# Patient Record
Sex: FEMALE | Race: WHITE | Employment: OTHER | URBAN - METROPOLITAN AREA
[De-identification: names, ages, dates, MRNs, and addresses within clinical notes are randomized per-mention and may not be internally consistent; named-entity substitution may affect disease eponyms.]

---

## 2017-10-05 ENCOUNTER — DOCTOR'S OFFICE (OUTPATIENT)
Dept: URBAN - METROPOLITAN AREA CLINIC 135 | Facility: CLINIC | Age: 67
Setting detail: OPHTHALMOLOGY
End: 2017-10-05
Payer: COMMERCIAL

## 2017-10-05 DIAGNOSIS — H52.4: ICD-10-CM

## 2017-10-05 DIAGNOSIS — H52.13: ICD-10-CM

## 2017-10-05 DIAGNOSIS — H40.013: ICD-10-CM

## 2017-10-05 DIAGNOSIS — H25.13: ICD-10-CM

## 2017-10-05 DIAGNOSIS — H43.813: ICD-10-CM

## 2017-10-05 DIAGNOSIS — H52.223: ICD-10-CM

## 2017-10-05 PROCEDURE — 92014 COMPRE OPH EXAM EST PT 1/>: CPT | Performed by: OPTOMETRIST

## 2017-10-05 PROCEDURE — 92015 DETERMINE REFRACTIVE STATE: CPT | Performed by: OPTOMETRIST

## 2017-10-05 ASSESSMENT — REFRACTION_OUTSIDERX
OD_VA3: 20/
OD_VA1: 20/20
OD_ADD: +2.50
OS_CYLINDER: -0.75
OS_VA2: 20/20
OD_SPHERE: -1.00
OD_CYLINDER: -0.50
OS_VA1: 20/20-2
OS_ADD: +2.50
OD_AXIS: 100
OS_AXIS: 135
OU_VA: 20/20
OS_SPHERE: -1.75
OD_VA2: 20/20
OS_VA3: 20/

## 2017-10-05 ASSESSMENT — REFRACTION_MANIFEST
OS_VA3: 20/
OU_VA: 20/
OD_VA3: 20/
OD_VA1: 20/
OD_VA2: 20/
OS_VA3: 20/
OS_VA1: 20/
OS_VA1: 20/
OS_VA2: 20/
OD_VA1: 20/
OD_VA3: 20/
OD_VA2: 20/
OU_VA: 20/
OS_VA2: 20/

## 2017-10-05 ASSESSMENT — REFRACTION_CURRENTRX
OS_AXIS: 125
OS_CYLINDER: -0.25
OD_OVR_VA: 20/
OS_SPHERE: -1.25
OD_CYLINDER: -0.25
OD_AXIS: 138
OD_ADD: +2.25
OS_OVR_VA: 20/
OD_SPHERE: -1.50
OD_VPRISM_DIRECTION: PROGS
OS_OVR_VA: 20/
OS_ADD: +2.25
OS_VPRISM_DIRECTION: PROGS
OD_OVR_VA: 20/
OS_OVR_VA: 20/
OD_OVR_VA: 20/

## 2017-10-05 ASSESSMENT — TEAR BREAK UP TIME (TBUT)
OS_TBUT: 1+
OD_TBUT: 1+

## 2017-10-05 ASSESSMENT — REFRACTION_AUTOREFRACTION
OD_SPHERE: -1.50
OS_CYLINDER: -0.50
OS_SPHERE: -1.50
OS_AXIS: 132
OD_AXIS: 102
OD_CYLINDER: -0.50

## 2017-10-05 ASSESSMENT — VISUAL ACUITY
OD_BCVA: 20/25-2
OS_BCVA: 20/25

## 2017-10-05 ASSESSMENT — CONFRONTATIONAL VISUAL FIELD TEST (CVF)
OS_FINDINGS: FULL
OD_FINDINGS: FULL

## 2017-10-05 ASSESSMENT — SPHEQUIV_DERIVED
OS_SPHEQUIV: -1.75
OD_SPHEQUIV: -1.75

## 2018-03-28 ENCOUNTER — TRANSCRIBE ORDERS (OUTPATIENT)
Dept: SCHEDULING | Age: 68
End: 2018-03-28

## 2018-03-28 DIAGNOSIS — D32.0 BENIGN NEOPLASM OF CEREBRAL MENINGES (CMS/HCC): Primary | ICD-10-CM

## 2018-04-02 ENCOUNTER — HOSPITAL ENCOUNTER (OUTPATIENT)
Dept: RADIOLOGY | Facility: HOSPITAL | Age: 68
Discharge: HOME | End: 2018-04-02
Attending: PSYCHIATRY & NEUROLOGY
Payer: MEDICARE

## 2018-04-02 DIAGNOSIS — D32.0 BENIGN NEOPLASM OF CEREBRAL MENINGES (CMS/HCC): ICD-10-CM

## 2018-04-02 RX ORDER — GADOBUTROL 604.72 MG/ML
6 INJECTION INTRAVENOUS
Status: COMPLETED | OUTPATIENT
Start: 2018-04-02 | End: 2018-04-02

## 2018-04-02 RX ADMIN — GADOBUTROL 6 MMOL: 604.72 INJECTION INTRAVENOUS at 10:57

## 2018-04-05 ENCOUNTER — DOCTOR'S OFFICE (OUTPATIENT)
Dept: URBAN - METROPOLITAN AREA CLINIC 135 | Facility: CLINIC | Age: 68
Setting detail: OPHTHALMOLOGY
End: 2018-04-05
Payer: COMMERCIAL

## 2018-04-05 DIAGNOSIS — H25.043: ICD-10-CM

## 2018-04-05 DIAGNOSIS — H25.13: ICD-10-CM

## 2018-04-05 DIAGNOSIS — H43.813: ICD-10-CM

## 2018-04-05 DIAGNOSIS — H40.013: ICD-10-CM

## 2018-04-05 PROCEDURE — 99213 OFFICE O/P EST LOW 20 MIN: CPT | Performed by: OPTOMETRIST

## 2018-04-05 PROCEDURE — 92083 EXTENDED VISUAL FIELD XM: CPT | Performed by: OPTOMETRIST

## 2018-04-05 ASSESSMENT — CONFRONTATIONAL VISUAL FIELD TEST (CVF)
OS_FINDINGS: FULL
OD_FINDINGS: FULL

## 2018-04-05 ASSESSMENT — TEAR BREAK UP TIME (TBUT)
OS_TBUT: 1+
OD_TBUT: 1+

## 2018-04-19 ENCOUNTER — DOCTOR'S OFFICE (OUTPATIENT)
Dept: URBAN - METROPOLITAN AREA CLINIC 135 | Facility: CLINIC | Age: 68
Setting detail: OPHTHALMOLOGY
End: 2018-04-19
Payer: COMMERCIAL

## 2018-04-19 ENCOUNTER — RX ONLY (RX ONLY)
Age: 68
End: 2018-04-19

## 2018-04-19 DIAGNOSIS — H25.013: ICD-10-CM

## 2018-04-19 DIAGNOSIS — H25.043: ICD-10-CM

## 2018-04-19 DIAGNOSIS — H40.013: ICD-10-CM

## 2018-04-19 DIAGNOSIS — H43.813: ICD-10-CM

## 2018-04-19 PROCEDURE — 92014 COMPRE OPH EXAM EST PT 1/>: CPT | Performed by: OPHTHALMOLOGY

## 2018-04-19 ASSESSMENT — REFRACTION_AUTOREFRACTION
OD_AXIS: 102
OS_SPHERE: -1.50
OS_SPHERE: -1.50
OS_CYLINDER: -0.50
OD_CYLINDER: -0.50
OS_CYLINDER: -0.50
OD_SPHERE: -1.50
OS_AXIS: 132
OD_CYLINDER: -0.50
OS_AXIS: 132
OD_SPHERE: -1.50
OD_AXIS: 102

## 2018-04-19 ASSESSMENT — REFRACTION_CURRENTRX
OS_OVR_VA: 20/
OD_OVR_VA: 20/
OD_OVR_VA: 20/
OS_VPRISM_DIRECTION: PROGS
OS_CYLINDER: -0.25
OS_OVR_VA: 20/
OS_ADD: +2.25
OD_SPHERE: -1.50
OD_ADD: +2.25
OD_AXIS: 138
OS_OVR_VA: 20/
OS_VPRISM_DIRECTION: PROGS
OS_SPHERE: -1.25
OS_SPHERE: -1.25
OD_OVR_VA: 20/
OD_VPRISM_DIRECTION: PROGS
OD_CYLINDER: -0.25
OD_SPHERE: -1.50
OS_OVR_VA: 20/
OS_CYLINDER: -0.25
OD_CYLINDER: -0.25
OD_AXIS: 138
OS_AXIS: 125
OS_AXIS: 125
OS_OVR_VA: 20/
OD_ADD: +2.25
OD_OVR_VA: 20/
OS_OVR_VA: 20/
OD_VPRISM_DIRECTION: PROGS
OS_ADD: +2.25

## 2018-04-19 ASSESSMENT — CONFRONTATIONAL VISUAL FIELD TEST (CVF)
OD_FINDINGS: FULL
OS_FINDINGS: FULL

## 2018-04-19 ASSESSMENT — REFRACTION_MANIFEST
OD_VA1: 20/
OS_VA1: 20/
OD_VA1: 20/
OD_VA3: 20/
OD_VA3: 20/
OS_VA3: 20/
OD_VA2: 20/
OD_VA1: 20/
OD_VA2: 20/
OU_VA: 20/
OU_VA: 20/
OS_VA2: 20/
OD_VA2: 20/
OD_VA3: 20/
OS_VA2: 20/
OD_VA1: 20/
OS_VA3: 20/
OD_VA2: 20/
OS_VA1: 20/
OU_VA: 20/
OS_VA3: 20/
OS_VA2: 20/
OS_VA1: 20/
OS_VA2: 20/
OS_VA3: 20/
OS_VA1: 20/
OU_VA: 20/
OD_VA3: 20/

## 2018-04-19 ASSESSMENT — REFRACTION_OUTSIDERX
OS_AXIS: 135
OU_VA: 20/20
OD_CYLINDER: -0.50
OS_VA2: 20/20
OS_CYLINDER: -0.75
OD_VA3: 20/
OS_VA1: 20/20-2
OD_CYLINDER: -0.50
OD_VA3: 20/
OU_VA: 20/20
OS_VA1: 20/20-2
OD_ADD: +2.50
OS_ADD: +2.50
OD_AXIS: 100
OS_SPHERE: -1.75
OD_SPHERE: -1.00
OS_AXIS: 135
OD_VA1: 20/20
OD_AXIS: 100
OS_SPHERE: -1.75
OS_VA2: 20/20
OD_VA1: 20/20
OD_VA2: 20/20
OD_SPHERE: -1.00
OS_ADD: +2.50
OS_CYLINDER: -0.75
OS_VA3: 20/
OS_VA3: 20/
OD_VA2: 20/20
OD_ADD: +2.50

## 2018-04-19 ASSESSMENT — SPHEQUIV_DERIVED
OD_SPHEQUIV: -1.75
OS_SPHEQUIV: -1.75
OS_SPHEQUIV: -1.75
OD_SPHEQUIV: -1.75

## 2018-04-19 ASSESSMENT — AXIALLENGTH_DERIVED
OD_AL: 24.8926
OS_AL: 24.9953

## 2018-04-19 ASSESSMENT — TEAR BREAK UP TIME (TBUT)
OD_TBUT: 1+
OS_TBUT: 1+

## 2018-04-19 ASSESSMENT — KERATOMETRY
OS_K1POWER_DIOPTERS: 41.75
OS_AXISANGLE_DEGREES: 169
OD_AXISANGLE_DEGREES: 120
OD_K2POWER_DIOPTERS: 42.25
OS_K2POWER_DIOPTERS: 41.75
OD_K1POWER_DIOPTERS: 41.75

## 2018-04-19 ASSESSMENT — VISUAL ACUITY
OS_BCVA: 20/25
OD_BCVA: 20/50+1
OD_BCVA: 20/50
OS_BCVA: 20/25

## 2018-06-07 ENCOUNTER — DOCTOR'S OFFICE (OUTPATIENT)
Dept: URBAN - METROPOLITAN AREA CLINIC 135 | Facility: CLINIC | Age: 68
Setting detail: OPHTHALMOLOGY
End: 2018-06-07
Payer: COMMERCIAL

## 2018-06-07 DIAGNOSIS — H25.013: ICD-10-CM

## 2018-06-07 PROCEDURE — 92136 OPHTHALMIC BIOMETRY: CPT | Performed by: OPHTHALMOLOGY

## 2018-06-18 ENCOUNTER — AMBUL SURGICAL CARE (OUTPATIENT)
Dept: URBAN - METROPOLITAN AREA SURGERY 29 | Facility: SURGERY | Age: 68
Setting detail: OPHTHALMOLOGY
End: 2018-06-18
Payer: COMMERCIAL

## 2018-06-18 ENCOUNTER — RX ONLY (RX ONLY)
Age: 68
End: 2018-06-18

## 2018-06-18 ENCOUNTER — DOCTOR'S OFFICE (OUTPATIENT)
Dept: URBAN - METROPOLITAN AREA CLINIC 135 | Facility: CLINIC | Age: 68
Setting detail: OPHTHALMOLOGY
End: 2018-06-18
Payer: COMMERCIAL

## 2018-06-18 DIAGNOSIS — H25.012: ICD-10-CM

## 2018-06-18 DIAGNOSIS — H25.12: ICD-10-CM

## 2018-06-18 DIAGNOSIS — H43.813: ICD-10-CM

## 2018-06-18 DIAGNOSIS — H25.043: ICD-10-CM

## 2018-06-18 DIAGNOSIS — H25.013: ICD-10-CM

## 2018-06-18 DIAGNOSIS — H25.042: ICD-10-CM

## 2018-06-18 DIAGNOSIS — H40.013: ICD-10-CM

## 2018-06-18 PROCEDURE — G8907 PT DOC NO EVENTS ON DISCHARG: HCPCS | Performed by: OPHTHALMOLOGY

## 2018-06-18 PROCEDURE — 66984 XCAPSL CTRC RMVL W/O ECP: CPT | Performed by: OPHTHALMOLOGY

## 2018-06-18 PROCEDURE — G8918 PT W/O PREOP ORDER IV AB PRO: HCPCS | Performed by: OPHTHALMOLOGY

## 2018-06-18 PROCEDURE — 99024 POSTOP FOLLOW-UP VISIT: CPT | Performed by: OPHTHALMOLOGY

## 2018-06-18 ASSESSMENT — REFRACTION_OUTSIDERX
OS_AXIS: 135
OD_SPHERE: -1.00
OD_AXIS: 100
OD_VA2: 20/20
OS_VA3: 20/
OS_SPHERE: -1.75
OD_ADD: +2.50
OS_ADD: +2.50
OS_VA2: 20/20
OS_CYLINDER: -0.75
OS_VA1: 20/20-2
OD_VA3: 20/
OU_VA: 20/20
OD_VA1: 20/20
OD_CYLINDER: -0.50

## 2018-06-18 ASSESSMENT — TEAR BREAK UP TIME (TBUT)
OS_TBUT: 1+
OD_TBUT: 1+

## 2018-06-18 ASSESSMENT — REFRACTION_MANIFEST
OD_VA1: 20/
OD_VA3: 20/
OS_VA2: 20/
OD_VA2: 20/
OU_VA: 20/
OS_VA3: 20/
OS_VA2: 20/
OU_VA: 20/
OS_VA1: 20/
OD_VA2: 20/
OD_VA3: 20/
OD_VA1: 20/
OS_VA1: 20/
OS_VA3: 20/

## 2018-06-18 ASSESSMENT — REFRACTION_CURRENTRX
OS_OVR_VA: 20/
OS_ADD: +2.25
OD_OVR_VA: 20/
OD_SPHERE: -1.50
OS_OVR_VA: 20/
OD_CYLINDER: -0.25
OD_AXIS: 138
OS_CYLINDER: -0.25
OS_AXIS: 125
OS_SPHERE: -1.25
OD_ADD: +2.25
OS_VPRISM_DIRECTION: PROGS
OS_OVR_VA: 20/
OD_VPRISM_DIRECTION: PROGS

## 2018-06-18 ASSESSMENT — VISUAL ACUITY
OS_BCVA: 20/25
OD_BCVA: 20/25+1

## 2018-06-18 ASSESSMENT — REFRACTION_AUTOREFRACTION
OS_CYLINDER: -0.50
OD_SPHERE: -1.50
OS_AXIS: 132
OD_CYLINDER: -0.50
OS_SPHERE: -1.50
OD_AXIS: 102

## 2018-06-18 ASSESSMENT — SPHEQUIV_DERIVED
OD_SPHEQUIV: -1.75
OS_SPHEQUIV: -1.75

## 2018-06-28 ENCOUNTER — DOCTOR'S OFFICE (OUTPATIENT)
Dept: URBAN - METROPOLITAN AREA CLINIC 135 | Facility: CLINIC | Age: 68
Setting detail: OPHTHALMOLOGY
End: 2018-06-28
Payer: COMMERCIAL

## 2018-06-28 DIAGNOSIS — Z96.1: ICD-10-CM

## 2018-06-28 PROCEDURE — 99024 POSTOP FOLLOW-UP VISIT: CPT | Performed by: OPTOMETRIST

## 2018-06-28 ASSESSMENT — REFRACTION_MANIFEST
OS_VA3: 20/
OS_VA2: 20/
OD_VA2: 20/
OS_VA1: 20/
OD_VA3: 20/
OD_VA1: 20/
OD_VA1: 20/
OD_VA2: 20/
OS_VA1: 20/
OU_VA: 20/
OU_VA: 20/
OS_VA3: 20/
OD_VA3: 20/
OS_VA2: 20/

## 2018-06-28 ASSESSMENT — REFRACTION_CURRENTRX
OD_ADD: +2.25
OS_OVR_VA: 20/
OS_CYLINDER: -0.25
OD_SPHERE: -1.50
OS_AXIS: 125
OS_ADD: +2.25
OS_VPRISM_DIRECTION: PROGS
OS_OVR_VA: 20/
OS_OVR_VA: 20/
OS_SPHERE: -1.25
OD_OVR_VA: 20/
OD_AXIS: 138
OD_VPRISM_DIRECTION: PROGS
OD_CYLINDER: -0.25
OD_OVR_VA: 20/
OD_OVR_VA: 20/

## 2018-06-28 ASSESSMENT — AXIALLENGTH_DERIVED: OD_AL: 24.8926

## 2018-06-28 ASSESSMENT — REFRACTION_OUTSIDERX
OD_CYLINDER: -0.50
OS_VA3: 20/
OD_ADD: +2.50
OS_AXIS: 135
OD_SPHERE: -1.00
OD_VA3: 20/
OS_ADD: +2.50
OS_CYLINDER: -0.75
OD_AXIS: 100
OU_VA: 20/20
OS_VA2: 20/20
OS_VA1: 20/20-2
OS_SPHERE: -1.75
OD_VA2: 20/20
OD_VA1: 20/20

## 2018-06-28 ASSESSMENT — REFRACTION_AUTOREFRACTION
OD_SPHERE: -1.50
OD_CYLINDER: -0.50
OS_SPHERE: -0.50
OD_AXIS: 102

## 2018-06-28 ASSESSMENT — KERATOMETRY
OD_K2POWER_DIOPTERS: 42.25
OD_K1POWER_DIOPTERS: 41.75
OD_AXISANGLE_DEGREES: 120
OS_K1POWER_DIOPTERS: 41.75
OS_K2POWER_DIOPTERS: 41.75
OS_AXISANGLE_DEGREES: 169

## 2018-06-28 ASSESSMENT — VISUAL ACUITY
OD_BCVA: 20/20
OS_BCVA: 20/25

## 2018-06-28 ASSESSMENT — SPHEQUIV_DERIVED: OD_SPHEQUIV: -1.75

## 2018-06-28 ASSESSMENT — TEAR BREAK UP TIME (TBUT)
OD_TBUT: 1+
OS_TBUT: 1+

## 2018-07-02 ENCOUNTER — AMBUL SURGICAL CARE (OUTPATIENT)
Dept: URBAN - METROPOLITAN AREA SURGERY 29 | Facility: SURGERY | Age: 68
Setting detail: OPHTHALMOLOGY
End: 2018-07-02
Payer: COMMERCIAL

## 2018-07-02 ENCOUNTER — DOCTOR'S OFFICE (OUTPATIENT)
Dept: URBAN - METROPOLITAN AREA CLINIC 135 | Facility: CLINIC | Age: 68
Setting detail: OPHTHALMOLOGY
End: 2018-07-02

## 2018-07-02 DIAGNOSIS — H25.041: ICD-10-CM

## 2018-07-02 DIAGNOSIS — Z96.1: ICD-10-CM

## 2018-07-02 DIAGNOSIS — H25.011: ICD-10-CM

## 2018-07-02 DIAGNOSIS — H25.11: ICD-10-CM

## 2018-07-02 PROBLEM — H25.013 CATARACT; BOTH EYES: Status: RESOLVED | Noted: 2018-04-19 | Resolved: 2018-07-02

## 2018-07-02 PROCEDURE — 66984 XCAPSL CTRC RMVL W/O ECP: CPT | Performed by: OPHTHALMOLOGY

## 2018-07-02 PROCEDURE — G8907 PT DOC NO EVENTS ON DISCHARG: HCPCS | Performed by: OPHTHALMOLOGY

## 2018-07-02 PROCEDURE — 99024 POSTOP FOLLOW-UP VISIT: CPT | Performed by: OPHTHALMOLOGY

## 2018-07-02 PROCEDURE — G8918 PT W/O PREOP ORDER IV AB PRO: HCPCS | Performed by: OPHTHALMOLOGY

## 2018-07-02 ASSESSMENT — REFRACTION_MANIFEST
OS_VA2: 20/
OS_VA1: 20/
OU_VA: 20/
OS_VA3: 20/
OS_VA1: 20/
OU_VA: 20/
OD_VA2: 20/
OD_VA3: 20/
OS_VA3: 20/
OD_VA1: 20/
OD_VA1: 20/
OS_VA2: 20/
OD_VA3: 20/
OD_VA2: 20/

## 2018-07-02 ASSESSMENT — REFRACTION_OUTSIDERX
OD_VA1: 20/20
OD_SPHERE: -1.00
OD_CYLINDER: -0.50
OS_ADD: +2.50
OU_VA: 20/20
OD_VA3: 20/
OS_SPHERE: -1.75
OD_ADD: +2.50
OD_VA2: 20/20
OS_VA3: 20/
OS_VA1: 20/20-2
OD_AXIS: 100
OS_VA2: 20/20
OS_AXIS: 135
OS_CYLINDER: -0.75

## 2018-07-02 ASSESSMENT — VISUAL ACUITY
OS_BCVA: 20/20-1
OD_BCVA: 20/20

## 2018-07-02 ASSESSMENT — REFRACTION_CURRENTRX
OD_OVR_VA: 20/
OS_SPHERE: -1.25
OS_OVR_VA: 20/
OS_ADD: +2.25
OD_SPHERE: -1.50
OD_CYLINDER: -0.25
OS_AXIS: 125
OD_AXIS: 138
OS_OVR_VA: 20/
OS_VPRISM_DIRECTION: PROGS
OS_CYLINDER: -0.25
OS_OVR_VA: 20/
OD_OVR_VA: 20/
OD_OVR_VA: 20/
OD_ADD: +2.25
OD_VPRISM_DIRECTION: PROGS

## 2018-07-02 ASSESSMENT — REFRACTION_AUTOREFRACTION
OS_SPHERE: -0.50
OD_SPHERE: -1.50
OD_AXIS: 102
OD_CYLINDER: -0.50

## 2018-07-02 ASSESSMENT — TEAR BREAK UP TIME (TBUT)
OS_TBUT: 1+
OD_TBUT: 1+

## 2018-07-02 ASSESSMENT — SPHEQUIV_DERIVED: OD_SPHEQUIV: -1.75

## 2018-07-09 ENCOUNTER — DOCTOR'S OFFICE (OUTPATIENT)
Dept: URBAN - METROPOLITAN AREA CLINIC 125 | Facility: CLINIC | Age: 68
Setting detail: OPHTHALMOLOGY
End: 2018-07-09
Payer: COMMERCIAL

## 2018-07-09 DIAGNOSIS — Z96.1: ICD-10-CM

## 2018-07-09 PROCEDURE — 99024 POSTOP FOLLOW-UP VISIT: CPT | Performed by: OPHTHALMOLOGY

## 2018-07-09 ASSESSMENT — TEAR BREAK UP TIME (TBUT)
OD_TBUT: 1+
OS_TBUT: 1+

## 2018-07-09 ASSESSMENT — REFRACTION_MANIFEST
OD_VA2: 20/
OU_VA: 20/
OS_VA3: 20/
OS_VA1: 20/
OD_VA3: 20/
OS_VA1: 20/
OD_VA2: 20/
OD_VA1: 20/
OD_VA1: 20/
OS_VA2: 20/
OS_VA3: 20/
OD_VA3: 20/
OS_VA2: 20/
OU_VA: 20/

## 2018-07-09 ASSESSMENT — KERATOMETRY
OS_K1POWER_DIOPTERS: 41.75
OD_K2POWER_DIOPTERS: 41.75
OS_AXISANGLE_DEGREES: 169
OD_AXISANGLE_DEGREES: 000
OS_K2POWER_DIOPTERS: 41.75
OD_K1POWER_DIOPTERS: 41.75

## 2018-07-09 ASSESSMENT — REFRACTION_CURRENTRX
OS_OVR_VA: 20/
OD_OVR_VA: 20/
OD_CYLINDER: -0.25
OD_SPHERE: -1.50
OS_AXIS: 125
OS_OVR_VA: 20/
OD_VPRISM_DIRECTION: PROGS
OS_OVR_VA: 20/
OD_ADD: +2.25
OS_ADD: +2.25
OD_AXIS: 138
OS_CYLINDER: -0.25
OS_VPRISM_DIRECTION: PROGS
OS_SPHERE: -1.25

## 2018-07-09 ASSESSMENT — AXIALLENGTH_DERIVED: OD_AL: 24.5128

## 2018-07-09 ASSESSMENT — REFRACTION_OUTSIDERX
OD_VA2: 20/20
OU_VA: 20/20
OS_VA1: 20/20-2
OD_CYLINDER: -0.50
OS_AXIS: 135
OD_SPHERE: -1.00
OS_VA3: 20/
OS_VA2: 20/20
OD_AXIS: 100
OD_VA1: 20/20
OD_VA3: 20/
OS_SPHERE: -1.75
OS_ADD: +2.50
OS_CYLINDER: -0.75
OD_ADD: +2.50

## 2018-07-09 ASSESSMENT — VISUAL ACUITY
OD_BCVA: 20/20
OS_BCVA: 20/30-1

## 2018-07-09 ASSESSMENT — REFRACTION_AUTOREFRACTION
OD_SPHERE: -0.25
OD_AXIS: 117
OD_CYLINDER: -0.75
OS_SPHERE: -0.50

## 2018-07-09 ASSESSMENT — SPHEQUIV_DERIVED: OD_SPHEQUIV: -0.625

## 2018-07-24 ENCOUNTER — TRANSCRIBE ORDERS (OUTPATIENT)
Dept: SCHEDULING | Age: 68
End: 2018-07-24

## 2018-07-24 DIAGNOSIS — Z12.31 ENCOUNTER FOR SCREENING MAMMOGRAM FOR MALIGNANT NEOPLASM OF BREAST: Primary | ICD-10-CM

## 2018-07-30 ENCOUNTER — RX ONLY (RX ONLY)
Age: 68
End: 2018-07-30

## 2018-07-30 ENCOUNTER — DOCTOR'S OFFICE (OUTPATIENT)
Dept: URBAN - METROPOLITAN AREA CLINIC 125 | Facility: CLINIC | Age: 68
Setting detail: OPHTHALMOLOGY
End: 2018-07-30
Payer: COMMERCIAL

## 2018-07-30 DIAGNOSIS — Z96.1: ICD-10-CM

## 2018-07-30 PROCEDURE — 99024 POSTOP FOLLOW-UP VISIT: CPT | Performed by: OPTOMETRIST

## 2018-07-30 ASSESSMENT — REFRACTION_MANIFEST
OD_VA1: 20/
OS_VA2: 20/
OD_VA3: 20/
OS_VA3: 20/
OS_VA1: 20/
OD_VA2: 20/
OS_VA2: 20/
OU_VA: 20/
OS_VA1: 20/
OS_VA3: 20/
OD_VA1: 20/
OD_VA2: 20/
OD_VA3: 20/
OU_VA: 20/

## 2018-07-30 ASSESSMENT — REFRACTION_OUTSIDERX
OD_CYLINDER: -0.75
OS_VA2: 20/20
OD_SPHERE: PLANO
OD_VA1: 20/20
OS_AXIS: 090
OD_AXIS: 115
OS_CYLINDER: -0.50
OS_VA3: 20/
OS_ADD: +2.50
OU_VA: 20/20
OD_ADD: +2.50
OS_SPHERE: PLANO
OD_VA3: 20/
OS_VA1: 20/20
OD_VA2: 20/20

## 2018-07-30 ASSESSMENT — SPHEQUIV_DERIVED
OS_SPHEQUIV: -0.5
OD_SPHEQUIV: -0.375

## 2018-07-30 ASSESSMENT — AXIALLENGTH_DERIVED
OD_AL: 24.4081
OS_AL: 24.4603

## 2018-07-30 ASSESSMENT — REFRACTION_CURRENTRX
OD_OVR_VA: 20/
OD_ADD: +2.25
OD_VPRISM_DIRECTION: PROGS
OD_CYLINDER: -0.25
OD_OVR_VA: 20/
OS_VPRISM_DIRECTION: PROGS
OD_SPHERE: -1.50
OS_ADD: +2.25
OS_AXIS: 125
OS_OVR_VA: 20/
OD_OVR_VA: 20/
OS_SPHERE: -1.25
OD_AXIS: 138
OS_OVR_VA: 20/
OS_CYLINDER: -0.25
OS_OVR_VA: 20/

## 2018-07-30 ASSESSMENT — TEAR BREAK UP TIME (TBUT)
OD_TBUT: 1+
OS_TBUT: 1+

## 2018-07-30 ASSESSMENT — KERATOMETRY
OS_K2POWER_DIOPTERS: 41.75
OS_K1POWER_DIOPTERS: 41.75
OS_AXISANGLE_DEGREES: 169
OD_AXISANGLE_DEGREES: 000
OD_K1POWER_DIOPTERS: 41.75
OD_K2POWER_DIOPTERS: 41.75

## 2018-07-30 ASSESSMENT — REFRACTION_AUTOREFRACTION
OS_AXIS: 090
OD_AXIS: 121
OD_SPHERE: 0.00
OS_CYLINDER: -0.50
OD_CYLINDER: -0.75
OS_SPHERE: -0.25

## 2018-07-30 ASSESSMENT — VISUAL ACUITY
OD_BCVA: 20/20
OS_BCVA: 20/30-

## 2018-08-28 ENCOUNTER — HOSPITAL ENCOUNTER (OUTPATIENT)
Dept: RADIOLOGY | Age: 68
Discharge: HOME | End: 2018-08-28
Attending: OBSTETRICS & GYNECOLOGY
Payer: MEDICARE

## 2018-08-28 ENCOUNTER — TRANSCRIBE ORDERS (OUTPATIENT)
Dept: RADIOLOGY | Age: 68
End: 2018-08-28

## 2018-08-28 DIAGNOSIS — Z12.31 ENCOUNTER FOR SCREENING MAMMOGRAM FOR MALIGNANT NEOPLASM OF BREAST: ICD-10-CM

## 2018-08-28 PROCEDURE — 77067 SCR MAMMO BI INCL CAD: CPT

## 2019-01-17 ENCOUNTER — DOCTOR'S OFFICE (OUTPATIENT)
Dept: URBAN - METROPOLITAN AREA CLINIC 135 | Facility: CLINIC | Age: 69
Setting detail: OPHTHALMOLOGY
End: 2019-01-17
Payer: COMMERCIAL

## 2019-01-17 DIAGNOSIS — H43.813: ICD-10-CM

## 2019-01-17 DIAGNOSIS — H40.013: ICD-10-CM

## 2019-01-17 DIAGNOSIS — Z96.1: ICD-10-CM

## 2019-01-17 PROBLEM — H25.043 PSC POLAR AGE RELATED CATARACT; BOTH EYES: Status: RESOLVED | Noted: 2018-04-05 | Resolved: 2019-01-17

## 2019-01-17 PROCEDURE — 92012 INTRM OPH EXAM EST PATIENT: CPT | Performed by: OPHTHALMOLOGY

## 2019-01-17 ASSESSMENT — REFRACTION_CURRENTRX
OD_AXIS: 138
OD_OVR_VA: 20/
OS_ADD: +2.25
OS_VPRISM_DIRECTION: PROGS
OS_AXIS: 125
OS_OVR_VA: 20/
OS_SPHERE: -1.25
OD_CYLINDER: -0.25
OD_ADD: +2.25
OS_OVR_VA: 20/
OD_SPHERE: -1.50
OS_OVR_VA: 20/
OD_OVR_VA: 20/
OD_VPRISM_DIRECTION: PROGS
OS_CYLINDER: -0.25
OD_OVR_VA: 20/

## 2019-01-17 ASSESSMENT — REFRACTION_MANIFEST
OD_VA3: 20/
OS_VA3: 20/
OS_VA2: 20/
OS_VA2: 20/20
OD_VA2: 20/
OU_VA: 20/
OD_AXIS: 115
OU_VA: 20/20
OD_ADD: +2.50
OS_VA3: 20/
OS_ADD: +2.50
OD_VA2: 20/20
OD_VA1: 20/
OD_SPHERE: PLANO
OS_SPHERE: PLANO
OD_CYLINDER: -0.75
OD_VA3: 20/
OS_VA1: 20/20
OD_VA1: 20/20
OS_AXIS: 090
OS_CYLINDER: -0.50
OS_VA1: 20/

## 2019-01-17 ASSESSMENT — SUPERFICIAL PUNCTATE KERATITIS (SPK)
OS_SPK: T
OD_SPK: T

## 2019-01-17 ASSESSMENT — REFRACTION_AUTOREFRACTION
OD_CYLINDER: -0.75
OS_AXIS: 090
OD_AXIS: 121
OS_CYLINDER: -0.50
OD_SPHERE: 0.00
OS_SPHERE: -0.25

## 2019-01-17 ASSESSMENT — VISUAL ACUITY
OD_BCVA: 20/20
OS_BCVA: 20/30+2

## 2019-01-17 ASSESSMENT — SPHEQUIV_DERIVED
OS_SPHEQUIV: -0.5
OD_SPHEQUIV: -0.375

## 2019-01-31 ENCOUNTER — TRANSCRIBE ORDERS (OUTPATIENT)
Dept: SCHEDULING | Age: 69
End: 2019-01-31

## 2019-01-31 ENCOUNTER — APPOINTMENT (OUTPATIENT)
Dept: LAB | Age: 69
End: 2019-01-31
Attending: INTERNAL MEDICINE
Payer: MEDICARE

## 2019-01-31 DIAGNOSIS — R14.0 ABDOMINAL DISTENSION (GASEOUS): ICD-10-CM

## 2019-01-31 DIAGNOSIS — K59.9 FUNCTIONAL INTESTINAL DISORDER: Primary | ICD-10-CM

## 2019-01-31 DIAGNOSIS — Z01.818 ENCOUNTER FOR OTHER PREPROCEDURAL EXAMINATION: ICD-10-CM

## 2019-01-31 LAB
BUN SERPL-MCNC: 16 MG/DL (ref 8–20)
CREAT SERPL-MCNC: 1 MG/DL
GFR SERPL CREATININE-BSD FRML MDRD: 55 ML/MIN/1.73M*2

## 2019-01-31 PROCEDURE — 36415 COLL VENOUS BLD VENIPUNCTURE: CPT

## 2019-01-31 PROCEDURE — 84520 ASSAY OF UREA NITROGEN: CPT

## 2019-01-31 PROCEDURE — 82565 ASSAY OF CREATININE: CPT

## 2019-02-01 ENCOUNTER — HOSPITAL ENCOUNTER (OUTPATIENT)
Dept: RADIOLOGY | Facility: HOSPITAL | Age: 69
Discharge: HOME | End: 2019-02-01
Attending: INTERNAL MEDICINE
Payer: MEDICARE

## 2019-02-01 VITALS — BODY MASS INDEX: 25.51 KG/M2 | WEIGHT: 135 LBS

## 2019-02-01 DIAGNOSIS — K59.9 FUNCTIONAL INTESTINAL DISORDER: ICD-10-CM

## 2019-02-01 DIAGNOSIS — R14.0 ABDOMINAL DISTENSION (GASEOUS): ICD-10-CM

## 2019-02-01 RX ORDER — GADOBUTROL 604.72 MG/ML
6 INJECTION INTRAVENOUS ONCE
Status: COMPLETED | OUTPATIENT
Start: 2019-02-01 | End: 2019-02-01

## 2019-02-01 RX ADMIN — GADOBUTROL 6 ML: 604.72 INJECTION INTRAVENOUS at 10:21

## 2019-02-28 ENCOUNTER — APPOINTMENT (EMERGENCY)
Dept: RADIOLOGY | Facility: HOSPITAL | Age: 69
End: 2019-02-28
Attending: EMERGENCY MEDICINE
Payer: MEDICARE

## 2019-02-28 ENCOUNTER — HOSPITAL ENCOUNTER (EMERGENCY)
Facility: HOSPITAL | Age: 69
Discharge: HOME | End: 2019-02-28
Attending: EMERGENCY MEDICINE | Admitting: EMERGENCY MEDICINE
Payer: MEDICARE

## 2019-02-28 VITALS
WEIGHT: 135 LBS | HEIGHT: 61 IN | RESPIRATION RATE: 16 BRPM | DIASTOLIC BLOOD PRESSURE: 87 MMHG | SYSTOLIC BLOOD PRESSURE: 133 MMHG | HEART RATE: 71 BPM | BODY MASS INDEX: 25.49 KG/M2 | TEMPERATURE: 97.8 F | OXYGEN SATURATION: 97 %

## 2019-02-28 DIAGNOSIS — M79.2 NEURALGIA INVOLVING SCALP: Primary | ICD-10-CM

## 2019-02-28 PROCEDURE — 70450 CT HEAD/BRAIN W/O DYE: CPT

## 2019-02-28 PROCEDURE — 63700000 HC SELF-ADMINISTRABLE DRUG: Performed by: NURSE PRACTITIONER

## 2019-02-28 PROCEDURE — 99284 EMERGENCY DEPT VISIT MOD MDM: CPT | Mod: 25

## 2019-02-28 RX ORDER — ACETAMINOPHEN 325 MG/1
650 TABLET ORAL ONCE
Status: COMPLETED | OUTPATIENT
Start: 2019-02-28 | End: 2019-02-28

## 2019-02-28 RX ADMIN — ACETAMINOPHEN 650 MG: 325 TABLET ORAL at 16:10

## 2019-02-28 ASSESSMENT — ENCOUNTER SYMPTOMS
SORE THROAT: 0
BACK PAIN: 0
COLOR CHANGE: 0
SHORTNESS OF BREATH: 0
ABDOMINAL PAIN: 0
VOMITING: 0
NUMBNESS: 0
DIARRHEA: 0
FATIGUE: 0
DIZZINESS: 0
NECK PAIN: 0
CONFUSION: 0
FEVER: 0
WEAKNESS: 0
SPEECH DIFFICULTY: 0
NAUSEA: 0
CONSTIPATION: 0
HEADACHES: 1
EYE PAIN: 0
COUGH: 0
PHOTOPHOBIA: 0
DIFFICULTY URINATING: 0

## 2019-02-28 NOTE — ED ATTESTATION NOTE
2/28/20195:19 PM  I have personally seen and examined the patient.  I reviewed and agree with the PA/NP/Resident's assessment and plan of care.    My examination, assessment, and plan of care of Muriel Franklin is as follows:  The patient presents with left scalp pulsations that are intermittent.  She denies a headache or nausea or vomiting.  She denies a fever chills.  She denies any rashes.  The patient states that she was recently on vacation and flown in airplane and slept on the airplane with her head cocked in the uncomfortable position.  She denies any trauma.  She denies any numbness tingling or weakness in the extremities.      Exam: The patient's scalp reveals no rashes or lesions.  The skin is somewhat sensitive to palpation and she had several episodes of intermittent discomfort that resolved but was examining her.  Neurologically she has a nonfocal exam.      Impression/Plan: Patient has symptoms consistent with an neuralgia.  There is no evidence of shingles at this time but she was given warning signs of shingles and the need to touch base with her PCP for follow-up.  We will treat this pain as a neuralgia with pain medications over-the-counter and to follow-up with her PCP.     I was physically present for the key/critical portions of the following procedures: None     Jacob Unger,   02/28/19 0756

## 2019-02-28 NOTE — DISCHARGE INSTRUCTIONS
Use Tylenol and/or Aleve as needed for pain as we discussed.    Pain continues over the next several days follow-up with your PCP for additional care and further evaluation.    Watch your scalp for any development of rash consistent with shingles and if you find a rash developing you remain require an antiviral medication.  If you do develop a rash contact your family doctor as soon as possible to discuss treatment.

## 2019-02-28 NOTE — ED PROVIDER NOTES
"HPI     Patient states starting last night at 930 developing a constant \"it is not a headache its a pain in my head is like a pulse and throbbing\" headache.  Patient states presently a 5 out of 10 better with movement.  Patient states she is attempted aspirin for headache without improvement of symptoms.  States she is never experienced in the past.  Patient states history of 3 TIAs however no CVAs and migraines for which she is on Topamax.  Patient denies any history of aneurysms.  No trauma.  No blood thinner use.  Patient denies fevers, cold symptoms, changes hearing/vision/speech, confusion, nausea, vomiting, numbness, weakness, pain elsewhere or any other symptoms.    Past Medical History:   Diagnosis Date   • Hypertension    • Lipid disorder    • Lymphoma, non-Hodgkin's (CMS/HCC) (HCC)        History reviewed. No pertinent surgical history.    Allergies   Allergen Reactions   • Chlorhexidine Rash       History   Smoking Status   • Never Smoker   Smokeless Tobacco   • Never Used       History   Alcohol Use   • 4.2 oz/week   • 7 Glasses of wine per week       History   Drug Use No       No LMP recorded. Patient is postmenopausal.    Review of Systems   Constitutional: Negative for fatigue and fever.   HENT: Negative for congestion, ear pain, hearing loss and sore throat.    Eyes: Negative for photophobia, pain and visual disturbance.   Respiratory: Negative for cough and shortness of breath.    Cardiovascular: Negative for chest pain.   Gastrointestinal: Negative for abdominal pain, constipation, diarrhea, nausea and vomiting.   Genitourinary: Negative for difficulty urinating.   Musculoskeletal: Negative for back pain and neck pain.   Skin: Negative for color change and rash.   Neurological: Positive for headaches. Negative for dizziness, speech difficulty, weakness and numbness.   Psychiatric/Behavioral: Negative for confusion.   All other systems reviewed and are negative.      Vitals:    02/28/19 1444 " "02/28/19 1609   BP: 135/86 133/87   BP Location:  Right upper arm   Patient Position:  Sitting   Pulse: 76 71   Resp: 16 16   Temp: 36.6 °C (97.9 °F) 36.6 °C (97.8 °F)   TempSrc: Temporal Temporal   SpO2: 99% 97%   Weight: 61.2 kg (135 lb)    Height: 1.549 m (5' 1\")        Physical Exam   Constitutional: She is oriented to person, place, and time. She appears well-developed and well-nourished. No distress.   Well appearing, nontoxic   HENT:   Head: Normocephalic and atraumatic.   Mouth/Throat: Mucous membranes are normal.   Airway intact. No sinus or temporal artery tenderness   Eyes: Conjunctivae are normal. Pupils are equal, round, and reactive to light.   Neck: Normal range of motion.   No nuchal rigidity   Cardiovascular: Normal rate, regular rhythm and normal heart sounds.    Pulmonary/Chest: Effort normal and breath sounds normal. No respiratory distress.   Abdominal: Soft. She exhibits no mass. There is no tenderness. There is no rigidity and no guarding.   Musculoskeletal: Normal range of motion. She exhibits no edema.   5/5 strength ann-marie u/le   Neurological: She is alert and oriented to person, place, and time. No cranial nerve deficit or sensory deficit.   Skin: Skin is warm and dry. No rash noted.   Psychiatric: She has a normal mood and affect. Her speech is normal and behavior is normal.   Nursing note and vitals reviewed.      Procedures    ED Course as of Feb 28 2204   Thu Feb 28, 2019   1512 Impression-VICTORIA    Well appearing, nontoxic. Benign exam and stable vitals    Plan-CT head, tylenol  [SAIDA]   1600 Ct IMPRESSION:    1. No focal acute intracranial abnormality.  2. Chronic changes noted under the comment.  [SAIDA]   1638 Patient very well-appearing with benign exam.  Stable vitals.  Unremarkable imaging.  Patient appears very comfortable.  Patient to be discharged to follow-up with primary care doctor.    Patient discussed with Dr acuña, agreeable to workup/plan    To patient's bedside. Patient exam " unchanged. Patient sitting quietly and comfortably in the stretcher.  Discussed results and plan with patient.  Patient verbalized understanding and agreeable without any questions or concerns.      Extensive education provided on signs and symptoms that would warrant a return visit to the emergency department along with emphasis on importance of follow-up.  Patient verbalized understanding and agreeable.  All questions answered    [SAIDA]      ED Course User Index  [SAIDA] Sayda Ibarra CRNP         Clinical Impressions as of Feb 28 2204   Neuralgia involving scalp       Final diagnoses:   [M79.2] Neuralgia involving scalp       Labs Reviewed - No data to display    CT HEAD WITHOUT IV CONTRAST   Final Result   IMPRESSION:      1. No focal acute intracranial abnormality.   2. Chronic changes noted under the comment.               Sayda Ibarra CRNP  02/28/19 2204

## 2019-03-05 ENCOUNTER — TRANSCRIBE ORDERS (OUTPATIENT)
Dept: PHYSICAL THERAPY | Age: 69
End: 2019-03-05

## 2019-03-05 DIAGNOSIS — M79.10 MYALGIA, UNSPECIFIED SITE: Primary | ICD-10-CM

## 2019-04-03 ENCOUNTER — OFFICE VISIT (OUTPATIENT)
Dept: FAMILY MEDICINE | Facility: CLINIC | Age: 69
End: 2019-04-03
Payer: MEDICARE

## 2019-04-03 VITALS
TEMPERATURE: 98.6 F | RESPIRATION RATE: 16 BRPM | OXYGEN SATURATION: 97 % | BODY MASS INDEX: 26.39 KG/M2 | DIASTOLIC BLOOD PRESSURE: 90 MMHG | SYSTOLIC BLOOD PRESSURE: 120 MMHG | HEART RATE: 87 BPM | HEIGHT: 61 IN | WEIGHT: 139.8 LBS

## 2019-04-03 DIAGNOSIS — K52.1 DRUG-INDUCED DIARRHEA: ICD-10-CM

## 2019-04-03 DIAGNOSIS — R79.89 ELEVATED LFTS: ICD-10-CM

## 2019-04-03 DIAGNOSIS — C82.00 FOLLICULAR LYMPHOMA GRADE I, UNSPECIFIED BODY REGION (CMS/HCC): ICD-10-CM

## 2019-04-03 DIAGNOSIS — E78.00 PURE HYPERCHOLESTEROLEMIA: ICD-10-CM

## 2019-04-03 DIAGNOSIS — B02.23 SHINGLES (HERPES ZOSTER) POLYNEUROPATHY: Primary | ICD-10-CM

## 2019-04-03 DIAGNOSIS — G45.9 TIA (TRANSIENT ISCHEMIC ATTACK): ICD-10-CM

## 2019-04-03 DIAGNOSIS — I10 ESSENTIAL HYPERTENSION: ICD-10-CM

## 2019-04-03 DIAGNOSIS — Z11.59 NEED FOR HEPATITIS C SCREENING TEST: ICD-10-CM

## 2019-04-03 PROBLEM — E78.2 MIXED HYPERLIPIDEMIA: Status: ACTIVE | Noted: 2019-04-03

## 2019-04-03 PROBLEM — C82.98: Status: ACTIVE | Noted: 2019-04-03

## 2019-04-03 PROBLEM — G43.109 MIGRAINE WITH AURA: Status: ACTIVE | Noted: 2019-04-03

## 2019-04-03 PROBLEM — H25.9 AGE-RELATED CATARACT: Status: ACTIVE | Noted: 2019-04-03

## 2019-04-03 PROBLEM — R42 DIZZINESS AND GIDDINESS: Status: ACTIVE | Noted: 2019-04-03

## 2019-04-03 PROBLEM — M12.9 ARTHROPATHY: Status: ACTIVE | Noted: 2019-04-03

## 2019-04-03 PROBLEM — E78.5 HYPERLIPIDEMIA: Status: ACTIVE | Noted: 2019-04-03

## 2019-04-03 PROBLEM — M16.10 PRIMARY LOCALIZED OSTEOARTHRITIS OF PELVIC REGION AND THIGH: Status: ACTIVE | Noted: 2019-04-03

## 2019-04-03 PROBLEM — K21.9 GASTRO-ESOPHAGEAL REFLUX DISEASE WITHOUT ESOPHAGITIS: Status: ACTIVE | Noted: 2019-04-03

## 2019-04-03 PROBLEM — D32.0 BENIGN NEOPLASM OF CEREBRAL MENINGES (CMS/HCC): Status: ACTIVE | Noted: 2019-04-03

## 2019-04-03 PROBLEM — I25.10 CORONARY ARTERIOSCLEROSIS IN NATIVE ARTERY: Status: ACTIVE | Noted: 2019-04-03

## 2019-04-03 PROBLEM — I69.30 SEQUELAE OF CEREBRAL INFARCTION: Status: ACTIVE | Noted: 2019-04-03

## 2019-04-03 PROBLEM — M54.50 LOW BACK PAIN: Status: ACTIVE | Noted: 2019-04-03

## 2019-04-03 PROBLEM — C85.99: Status: ACTIVE | Noted: 2019-04-03

## 2019-04-03 PROBLEM — C78.6: Status: ACTIVE | Noted: 2019-02-06

## 2019-04-03 PROBLEM — K52.9 GASTROENTERITIS: Status: ACTIVE | Noted: 2019-04-03

## 2019-04-03 PROBLEM — C78.7 SECONDARY MALIGNANT NEOPLASM OF LIVER (CMS/HCC): Status: ACTIVE | Noted: 2019-02-06

## 2019-04-03 PROCEDURE — 99204 OFFICE O/P NEW MOD 45 MIN: CPT | Performed by: FAMILY MEDICINE

## 2019-04-03 RX ORDER — GABAPENTIN 300 MG/1
CAPSULE ORAL
Refills: 2 | COMMUNITY
Start: 2019-03-18 | End: 2019-07-08

## 2019-04-03 ASSESSMENT — ENCOUNTER SYMPTOMS
CHEST TIGHTNESS: 0
PALPITATIONS: 0
NUMBNESS: 0
CONFUSION: 0
SEIZURES: 0
ABDOMINAL PAIN: 0
FEVER: 0
SHORTNESS OF BREATH: 0
NECK PAIN: 1
FATIGUE: 1
DIARRHEA: 1
ARTHRALGIAS: 1
FACIAL ASYMMETRY: 0
HEADACHES: 1
DIFFICULTY URINATING: 0
BLOOD IN STOOL: 0
SPEECH DIFFICULTY: 0

## 2019-04-03 NOTE — PROGRESS NOTES
"Subjective      Patient ID: Muriel Franklin is a 69 y.o. female.      HPI    The following have been reviewed and updated as appropriate in this visit:  Allergies  Meds  Problems        New pt   1. Acute issue  Dx with nerve pain 5 weeks ago then rash few days later made dx clear shingles -  Horrible headache and left side of head neck   Had CT -because  no rash at the time  -  then rash developed   Head pain is gone now settled into left shoulder   Still on gabapentin 300 x 3  3 x a day 2700 mg rx per neuro     Getting a lot of GI side effects -  Loose stools very disturbing   Neuro has rec staying on paula for an other week then tapering slowly     Also dx   htn   High cholesterol    TIA x 3 first  25 yrs ago on OCP ;  Another on morphine after a surgery  -  Whole in her heart ASD or VSD  -   Dr Cerda      Follicular lymphoma. Per onc \"Based on her findings she has grade 1-2 follicular lymphoma. She appears to have diffuse lymphadenopathy as well as marrow involvement based on her circulating clonal population. She later developed several sites of liver involvement. I reviewed with her the general prognosis and treatment strategy of follicular lymphoma which often is viewed as a chronic condition with periods of relapses that may require systemic therapy to induce remission.\"    3 csections   Both knee and hip replacements   Bunionectomy     Sees Dr Kirkpatrick - constipation  Liver lesion biopsy      Retired           Review of Systems   Constitutional: Positive for fatigue. Negative for fever.   HENT: Negative for congestion.    Eyes: Negative for visual disturbance.   Respiratory: Negative for chest tightness and shortness of breath.    Cardiovascular: Negative for chest pain and palpitations.   Gastrointestinal: Positive for diarrhea. Negative for abdominal pain and blood in stool.   Genitourinary: Negative for difficulty urinating.   Musculoskeletal: Positive for arthralgias and neck pain. " "  Neurological: Positive for headaches. Negative for seizures, facial asymmetry, speech difficulty and numbness.   Psychiatric/Behavioral: Negative for confusion.         Current Outpatient Prescriptions:   •  aspirin 81 mg chewable tablet, Take 81 mg by mouth daily., Disp: , Rfl:   •  calcium carbonate (CALCIUM 500 ORAL), Take by mouth., Disp: , Rfl:   •  cholecalciferol, vitamin D3, 1,000 unit capsule, Take 1,000 Units by mouth daily., Disp: , Rfl:   •  coenzyme Q10 (COQ10) 100 mg capsule, Take 100 mg by mouth 2 (two) times a day. , Disp: , Rfl:   •  cranberry conc-C-bacillus coag (AZO CRANBERRY + PROBIOTIC) 250-30-50 mg-mg-million tablet, Take by mouth daily., Disp: , Rfl:   •  gabapentin (NEURONTIN) 300 mg capsule, TAKE 3 CAPSULES 3 TIMES DAILY, Disp: , Rfl: 2  •  Lactobac no.41/Bifidobact no.7 (PROBIOTIC-10 ORAL), Take by mouth., Disp: , Rfl:   •  losartan (COZAAR) 25 mg tablet, Take 25 mg by mouth daily., Disp: , Rfl:   •  magnesium chloride 64 mg tablet,delayed release (DR/EC), Take by mouth daily., Disp: , Rfl:   •  multivitamin tablet, Take by mouth daily., Disp: , Rfl:   •  rosuvastatin (CRESTOR) 20 mg tablet, Take 20 mg by mouth daily., Disp: , Rfl:   •  topiramate (TOPAMAX) 25 mg tablet, Take 25 mg by mouth daily. , Disp: , Rfl:     Past Medical History:   Diagnosis Date   • Hypertension    • Lipid disorder    • Lymphoma, non-Hodgkin's (CMS/HCC) (HCC)      Past Surgical History:   Procedure Laterality Date   • FOOT SURGERY     • HIP SURGERY     • KNEE SURGERY       Objective     Vitals:    04/03/19 1029   BP: 120/90   BP Location: Right upper arm   Patient Position: Sitting   Pulse: 87   Resp: 16   Temp: 37 °C (98.6 °F)   TempSrc: Oral   SpO2: 97%   Weight: 63.4 kg (139 lb 12.8 oz)   Height: 1.537 m (5' 0.5\")     Body mass index is 26.85 kg/m².    Physical Exam   Constitutional: She is oriented to person, place, and time. She appears well-developed and well-nourished.   HENT:   Head: Normocephalic and " atraumatic.   Neck: Normal range of motion. Neck supple. No thyromegaly present.   Cardiovascular: Normal rate, regular rhythm and normal heart sounds.  Exam reveals no gallop and no friction rub.    No murmur heard.  Pulmonary/Chest: Effort normal and breath sounds normal. No respiratory distress. She has no wheezes. She has no rales.   Musculoskeletal: She exhibits no edema or deformity.   Lymphadenopathy:     She has no cervical adenopathy.   Neurological: She is alert and oriented to person, place, and time.   Rash resovled     Pain in neck to shoulder to arm     Pain constant not really increase with ROM of shoulder   Skin: Skin is warm and dry.   Psychiatric: She has a normal mood and affect. Her behavior is normal. Judgment and thought content normal.       Assessment/Plan   Problem List Items Addressed This Visit        Nervous    TIA (transient ischemic attack)    Shingles (herpes zoster) polyneuropathy - Primary       Circulatory    Hypertension    Relevant Orders    CBC and differential    Comprehensive metabolic panel    Lipid panel       Digestive    Drug-induced diarrhea       Endocrine/Metabolic    Hyperlipidemia    Relevant Orders    CBC and differential    Comprehensive metabolic panel    Lipid panel       Hematologic    Follicular lymphoma (CMS/HCC) (HCC)       Other    Elevated LFTs      Other Visit Diagnoses     Need for hepatitis C screening test        Relevant Orders    Hepatitis C antibody      disc shingles pain at length disc side effect of gabapentin having loose stools ;  rec tapering off gabapentin   Can try occas immodium if needed for diarrhea but caution to use sparingly to not cause constipation      Disc chol / TIA get labs   BP controlled reviewed meds   followup for MAW   Reviewed records in EMR care everytwhere but also need PCP cardio and GI records

## 2019-04-05 RX ORDER — TOPIRAMATE 25 MG/1
25 TABLET ORAL DAILY
Qty: 90 TABLET | Refills: 0 | Status: SHIPPED | OUTPATIENT
Start: 2019-04-05 | End: 2019-07-08 | Stop reason: SDUPTHER

## 2019-06-12 ENCOUNTER — RX ONLY (RX ONLY)
Age: 69
End: 2019-06-12

## 2019-06-12 ENCOUNTER — DOCTOR'S OFFICE (OUTPATIENT)
Dept: URBAN - METROPOLITAN AREA CLINIC 135 | Facility: CLINIC | Age: 69
Setting detail: OPHTHALMOLOGY
End: 2019-06-12
Payer: COMMERCIAL

## 2019-06-12 DIAGNOSIS — H40.013: ICD-10-CM

## 2019-06-12 DIAGNOSIS — H26.40: ICD-10-CM

## 2019-06-12 DIAGNOSIS — Z96.1: ICD-10-CM

## 2019-06-12 DIAGNOSIS — H43.813: ICD-10-CM

## 2019-06-12 PROCEDURE — 92083 EXTENDED VISUAL FIELD XM: CPT | Performed by: OPHTHALMOLOGY

## 2019-06-12 PROCEDURE — 92014 COMPRE OPH EXAM EST PT 1/>: CPT | Performed by: OPHTHALMOLOGY

## 2019-06-12 ASSESSMENT — AXIALLENGTH_DERIVED
OD_AL: 24.7
OS_AL: 24.54

## 2019-06-12 ASSESSMENT — REFRACTION_MANIFEST
OS_VA1: 20/20
OD_CYLINDER: -0.75
OD_SPHERE: PLANO
OD_VA1: 20/
OU_VA: 20/
OD_VA2: 20/20
OS_VA3: 20/
OD_VA2: 20/
OD_ADD: +2.50
OD_VA3: 20/
OS_AXIS: 090
OS_CYLINDER: -0.50
OU_VA: 20/20
OS_VA3: 20/
OS_VA1: 20/
OS_VA2: 20/20
OS_SPHERE: PLANO
OD_VA3: 20/
OS_VA2: 20/
OS_ADD: +2.50
OD_VA1: 20/20
OD_AXIS: 115

## 2019-06-12 ASSESSMENT — REFRACTION_CURRENTRX
OD_SPHERE: -1.50
OD_OVR_VA: 20/
OS_SPHERE: -1.25
OS_OVR_VA: 20/
OS_OVR_VA: 20/
OD_AXIS: 138
OD_VPRISM_DIRECTION: PROGS
OD_OVR_VA: 20/
OD_OVR_VA: 20/
OS_CYLINDER: -0.25
OS_OVR_VA: 20/
OS_ADD: +2.25
OS_AXIS: 125
OS_VPRISM_DIRECTION: PROGS
OD_ADD: +2.25
OD_CYLINDER: -0.25

## 2019-06-12 ASSESSMENT — KERATOMETRY
OD_K2POWER_DIOPTERS: 41.75
OD_K1POWER_DIOPTERS: 41.75
OS_K2POWER_DIOPTERS: 41.75
OS_AXISANGLE_DEGREES: 169
OS_K1POWER_DIOPTERS: 41.75
OD_AXISANGLE_DEGREES: 000

## 2019-06-12 ASSESSMENT — REFRACTION_AUTOREFRACTION
OS_SPHERE: -0.37
OD_CYLINDER: -0.62
OS_CYLINDER: -0.62
OS_AXIS: 8
OD_SPHERE: -0.75
OD_AXIS: 110

## 2019-06-12 ASSESSMENT — SUPERFICIAL PUNCTATE KERATITIS (SPK)
OS_SPK: T
OD_SPK: T

## 2019-06-12 ASSESSMENT — SPHEQUIV_DERIVED
OD_SPHEQUIV: -1.06
OS_SPHEQUIV: -0.68

## 2019-06-12 ASSESSMENT — VISUAL ACUITY
OD_BCVA: 20/30-2
OS_BCVA: 20/40

## 2019-06-12 ASSESSMENT — CONFRONTATIONAL VISUAL FIELD TEST (CVF)
OS_FINDINGS: FULL
OD_FINDINGS: FULL

## 2019-06-21 LAB
ALBUMIN SERPL-MCNC: 4.6 G/DL (ref 3.6–5.1)
ALBUMIN/GLOB SERPL: 2.2 (CALC) (ref 1–2.5)
ALP SERPL-CCNC: 70 U/L (ref 33–130)
ALT SERPL-CCNC: 23 U/L (ref 6–29)
AST SERPL-CCNC: 32 U/L (ref 10–35)
BASOPHILS # BLD AUTO: 50 CELLS/UL (ref 0–200)
BASOPHILS NFR BLD AUTO: 1.1 %
BILIRUB SERPL-MCNC: 0.6 MG/DL (ref 0.2–1.2)
BUN SERPL-MCNC: 14 MG/DL (ref 7–25)
BUN/CREAT SERPL: 15 (CALC) (ref 6–22)
CALCIUM SERPL-MCNC: 9.5 MG/DL (ref 8.6–10.4)
CHLORIDE SERPL-SCNC: 105 MMOL/L (ref 98–110)
CHOLEST SERPL-MCNC: 154 MG/DL
CHOLEST/HDLC SERPL: 2.9 (CALC)
CO2 SERPL-SCNC: 24 MMOL/L (ref 20–32)
CREAT SERPL-MCNC: 0.94 MG/DL (ref 0.6–0.93)
EOSINOPHIL # BLD AUTO: 90 CELLS/UL (ref 15–500)
EOSINOPHIL NFR BLD AUTO: 2 %
ERYTHROCYTE [DISTWIDTH] IN BLOOD BY AUTOMATED COUNT: 13.1 % (ref 11–15)
GLOBULIN SER CALC-MCNC: 2.1 G/DL (CALC) (ref 1.9–3.7)
GLUCOSE SERPL-MCNC: 83 MG/DL (ref 65–99)
HCT VFR BLD AUTO: 43.5 % (ref 35–45)
HCV AB S/CO SERPL IA: 0.01
HCV AB SERPL QL IA: NORMAL
HDLC SERPL-MCNC: 53 MG/DL
HGB BLD-MCNC: 14.2 G/DL (ref 11.7–15.5)
LDLC SERPL CALC-MCNC: 82 MG/DL (CALC)
LYMPHOCYTES # BLD AUTO: 779 CELLS/UL (ref 850–3900)
LYMPHOCYTES NFR BLD AUTO: 17.3 %
MCH RBC QN AUTO: 30.5 PG (ref 27–33)
MCHC RBC AUTO-ENTMCNC: 32.6 G/DL (ref 32–36)
MCV RBC AUTO: 93.3 FL (ref 80–100)
MONOCYTES # BLD AUTO: 518 CELLS/UL (ref 200–950)
MONOCYTES NFR BLD AUTO: 11.5 %
NEUTROPHILS # BLD AUTO: 3065 CELLS/UL (ref 1500–7800)
NEUTROPHILS NFR BLD AUTO: 68.1 %
NONHDLC SERPL-MCNC: 101 MG/DL (CALC)
PLATELET # BLD AUTO: 159 THOUSAND/UL (ref 140–400)
PMV BLD REES-ECKER: 11.4 FL (ref 7.5–12.5)
POTASSIUM SERPL-SCNC: 4.3 MMOL/L (ref 3.5–5.3)
PROT SERPL-MCNC: 6.7 G/DL (ref 6.1–8.1)
QUEST EGFR NON-AFR. AMERICAN: 61 ML/MIN/1.73M2
RBC # BLD AUTO: 4.66 MILLION/UL (ref 3.8–5.1)
SODIUM SERPL-SCNC: 140 MMOL/L (ref 135–146)
TRIGL SERPL-MCNC: 97 MG/DL
WBC # BLD AUTO: 4.5 THOUSAND/UL (ref 3.8–10.8)

## 2019-07-08 ENCOUNTER — OFFICE VISIT (OUTPATIENT)
Dept: FAMILY MEDICINE | Facility: CLINIC | Age: 69
End: 2019-07-08
Payer: MEDICARE

## 2019-07-08 VITALS
OXYGEN SATURATION: 97 % | HEIGHT: 61 IN | BODY MASS INDEX: 25.64 KG/M2 | HEART RATE: 78 BPM | RESPIRATION RATE: 16 BRPM | DIASTOLIC BLOOD PRESSURE: 80 MMHG | WEIGHT: 135.8 LBS | TEMPERATURE: 98.6 F | SYSTOLIC BLOOD PRESSURE: 110 MMHG

## 2019-07-08 DIAGNOSIS — G45.9 TIA (TRANSIENT ISCHEMIC ATTACK): ICD-10-CM

## 2019-07-08 DIAGNOSIS — C82.00 FOLLICULAR LYMPHOMA GRADE I, UNSPECIFIED BODY REGION (CMS/HCC): ICD-10-CM

## 2019-07-08 DIAGNOSIS — B02.23 SHINGLES (HERPES ZOSTER) POLYNEUROPATHY: ICD-10-CM

## 2019-07-08 DIAGNOSIS — Z12.11 SCREENING FOR COLON CANCER: ICD-10-CM

## 2019-07-08 DIAGNOSIS — Z00.00 MEDICARE ANNUAL WELLNESS VISIT, SUBSEQUENT: Primary | ICD-10-CM

## 2019-07-08 DIAGNOSIS — I10 ESSENTIAL HYPERTENSION: ICD-10-CM

## 2019-07-08 DIAGNOSIS — M85.89 OSTEOPENIA OF MULTIPLE SITES: ICD-10-CM

## 2019-07-08 DIAGNOSIS — E78.00 PURE HYPERCHOLESTEROLEMIA: ICD-10-CM

## 2019-07-08 DIAGNOSIS — Z12.39 BREAST CANCER SCREENING: ICD-10-CM

## 2019-07-08 PROBLEM — B02.9 SHINGLES: Status: ACTIVE | Noted: 2019-05-03

## 2019-07-08 PROCEDURE — G0439 PPPS, SUBSEQ VISIT: HCPCS | Performed by: FAMILY MEDICINE

## 2019-07-08 RX ORDER — TOPIRAMATE 25 MG/1
25 TABLET ORAL DAILY
Qty: 90 TABLET | Refills: 1 | Status: SHIPPED | OUTPATIENT
Start: 2019-07-08 | End: 2019-07-08 | Stop reason: SDUPTHER

## 2019-07-08 RX ORDER — ACYCLOVIR 400 MG/1
400 TABLET ORAL 2 TIMES DAILY
COMMUNITY
Start: 2019-07-06 | End: 2023-05-17 | Stop reason: HOSPADM

## 2019-07-08 RX ORDER — TOPIRAMATE 25 MG/1
25 TABLET ORAL DAILY
Qty: 90 TABLET | Refills: 1 | Status: SHIPPED | OUTPATIENT
Start: 2019-07-08 | End: 2020-01-02

## 2019-07-08 ASSESSMENT — MINI COG
COMPLETED: YES
TOTAL SCORE: 3

## 2019-07-08 NOTE — PROGRESS NOTES
"    Subjective     Muriel Franklin is a 69 y.o. female who presents for a subsequent annual wellness visit.     Disc labs  ldl 82 ( on crestor)   Hep C neg   Lab Results   Component Value Date    WBC 4.5 06/20/2019    HGB 14.2 06/20/2019    HCT 43.5 06/20/2019     06/20/2019    CHOL 154 06/20/2019    TRIG 97 06/20/2019    HDL 53 06/20/2019    ALT 23 06/20/2019    AST 32 06/20/2019     06/20/2019    K 4.3 06/20/2019     06/20/2019    CREATININE 0.94 (H) 06/20/2019    BUN 14 06/20/2019    CO2 24 06/20/2019       Pt with hx of lymphoma she has had onc follow up  -  Reviewed chart - per  onc note \"ASSESSMENT AND PLAN:  Follicular lymphoma. Based on her findings she has grade 1-2 follicular lymphoma. She appears to have diffuse lymphadenopathy as well as marrow involvement based on her circulating clonal population. She later developed several sites of liver involvement. I reviewed with her the general prognosis and treatment strategy of follicular lymphoma which often is viewed as a chronic condition with periods of relapses that may require systemic therapy to induce remission.Her degree of lymphocytosis and lymphadenopathy have spontaneously improved.Her 2/6/19 PET/CT appears to be stable with regard to her lymphoma although there was some subtle progression of several hepatic lesions since 3/12/18. Biopsy done 2/12/19 suggested these were consistent with follicular lymphoma.\"  onc also started acyclovir to prevent shingles     She also had hx stable benign meningoma ( ct also shows chronic small vessel ischemic  Disease she has hc of TIA x 3      htn Bp controlled     elev lft resolved     Does exercise at class and rides bike  And plays words with friends etc      Does see gyn main line        Oct dec  to [place] then back again        Comprehensive Medical and Social History  Patient Active Problem List   Diagnosis   • Age-related cataract   • Arthropathy   • Benign neoplasm of cerebral meninges " (CMS/HCC)   • Coronary arteriosclerosis in native artery   • Dizziness and giddiness   • Elevated LFTs   • Essential hypertension   • Follicular lymphoma (CMS/HCC) (HCC)   • Gastroenteritis   • Gastro-esophageal reflux disease without esophagitis   • Hyperlipidemia   • Hypertension   • Primary localized osteoarthritis of pelvic region and thigh   • Low back pain   • Lymphocytosis   • Lymphoma of salivary gland (CMS/HCC) (HCC)   • Meningioma (CMS/HCC) (HCC)   • Migraine with aura   • Mixed hyperlipidemia   • 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)   • Non-Hodgkin's lymphoma of multiple sites (CMS/HCC) (HCC)   • Secondary malignant neoplasm of liver (CMS/HCC)   • Secondary malignant peritoneal deposit (CMS/HCC) (HCC)   • Sequelae of cerebral infarction   • Thrombocytopenia (CMS/HCC) (HCC)   • TIA (transient ischemic attack)   • Drug-induced diarrhea   • Shingles (herpes zoster) polyneuropathy   • Shingles   • Medicare annual wellness visit, subsequent   • Osteopenia of multiple sites     Past Medical History:   Diagnosis Date   • Hypertension    • Lipid disorder    • Lymphoma, non-Hodgkin's (CMS/HCC) (HCC)      Past Surgical History:   Procedure Laterality Date   • FOOT SURGERY     • HIP SURGERY     • KNEE SURGERY       Allergies   Allergen Reactions   • Isopropyl Alcohol      Other reaction(s): Blisters    • Sulfa (Sulfonamide Antibiotics)      Other reaction(s): thought to cause TIA  Other reaction(s): foot soreness and blistering of left foot after surgery  Other reaction(s): hematoma and blistering after surgery   • Chlorhexidine Rash     Current Outpatient Prescriptions   Medication Sig Dispense Refill   • acyclovir (ZOVIRAX) 400 mg tablet      • aspirin 81 mg chewable tablet Take 81 mg by mouth daily.     • calcium carbonate (CALCIUM 500 ORAL) Take by mouth.     • cholecalciferol, vitamin D3, 1,000 unit capsule Take 1,000 Units by mouth daily.     • coenzyme Q10 (COQ10) 100 mg capsule Take  "100 mg by mouth 2 (two) times a day.      • cranberry conc-C-bacillus coag (AZO CRANBERRY + PROBIOTIC) 250-30-50 mg-mg-million tablet Take by mouth daily.     • Lactobac no.41/Bifidobact no.7 (PROBIOTIC-10 ORAL) Take by mouth.     • losartan (COZAAR) 25 mg tablet Take 25 mg by mouth daily.     • magnesium chloride 64 mg tablet,delayed release (DR/EC) Take by mouth daily.     • multivitamin tablet Take by mouth daily.     • rosuvastatin (CRESTOR) 20 mg tablet Take 20 mg by mouth daily.     • topiramate (TOPAMAX) 25 mg tablet Take 1 tablet (25 mg total) by mouth daily. 90 tablet 1     No current facility-administered medications for this visit.      Social History     Social History   • Marital status:      Spouse name: N/A   • Number of children: N/A   • Years of education: N/A     Social History Main Topics   • Smoking status: Never Smoker   • Smokeless tobacco: Never Used   • Alcohol use 4.2 oz/week     7 Glasses of wine per week   • Drug use: No   • Sexual activity: Defer     Other Topics Concern   • None     Social History Narrative   • None       Objective   Vitals  Vitals:    07/08/19 1539   BP: 110/80   BP Location: Right upper arm   Patient Position: Sitting   Pulse: 78   Resp: 16   Temp: 37 °C (98.6 °F)   TempSrc: Oral   SpO2: 97%   Weight: 61.6 kg (135 lb 12.8 oz)   Height: 1.549 m (5' 1\")     Body mass index is 25.66 kg/m².    Advanced Care Plan  Does patient have advance directive?: Yes       Patient has Advance Directive: Patient has Advance Directive, has not brought in                             PHQ  Over the Past 2 Weeks, Have You Felt Down, Depressed or Hopeless?: no  Over the Past 2 Weeks, Have You Felt Little Interest or Pleasure In Doing Things?: no                                          Mini Cog  Completed: Yes  Score: 3  Result: Positive    Get Up and Go  Result: Pass            STEADI Falls Risk  One or more falls in the last year: No           Has trouble stepping up onto a curb: No "   Advised to use a cane or walker to get around safely: No   Often has to rush to the toilet: No   Feels unsteady when walking: No   Has lost some feeling in feet: No   Often feels sad or depressed: No   Steadies self on furniture while walking at home: No   Takes medication that makes him/her feel lightheaded or more tired than usual: No   Worried about falling: No   Takes medicine to sleep or improve mood: No   Needs to push with hands when rising from a chair: No   Falls screen completed: Yes       Hearing and Vision Screening  No exam data present  See HRA for relevant hearing screening response.      Assessment/Plan   Diagnoses and all orders for this visit:    Medicare annual wellness visit, subsequent (Primary)    Shingles (herpes zoster) polyneuropathy    TIA (transient ischemic attack)    Follicular lymphoma grade I, unspecified body region (CMS/HCC)    Essential hypertension    Pure hypercholesterolemia    Osteopenia of multiple sites  -     DEXA BONE DENSITY; Future    Breast cancer screening  -     BI SCREENING MAMMOGRAM BILATERAL; Future    Screening for colon cancer  -     FIT Medicare; Future    Other orders  -     topiramate (TOPAMAX) 25 mg tablet; Take 1 tablet (25 mg total) by mouth daily.      dexa   mammo   Stool test for colon ca screening           See Patient Instructions (the written plan) which was given to the patient for PPPS and health risk factors with interventions.

## 2019-07-08 NOTE — PATIENT INSTRUCTIONS
Women's Personalized Prevention Plan Services (PPPS)  see    Preventive Services Checklist (Assumes Average Risk Unless Otherwise Noted)    dexa   mammo   Stool test for colon ca screening

## 2019-07-11 PROCEDURE — G0328 FECAL BLOOD SCRN IMMUNOASSAY: HCPCS | Performed by: FAMILY MEDICINE

## 2019-07-16 ENCOUNTER — LAB REQUISITION (OUTPATIENT)
Dept: LAB | Facility: HOSPITAL | Age: 69
End: 2019-07-16
Attending: FAMILY MEDICINE
Payer: MEDICARE

## 2019-07-16 DIAGNOSIS — Z12.11 ENCOUNTER FOR SCREENING FOR MALIGNANT NEOPLASM OF COLON: ICD-10-CM

## 2019-07-17 LAB — HEMOCCULT STL QL IA: NEGATIVE

## 2019-08-19 ENCOUNTER — TELEPHONE (OUTPATIENT)
Dept: FAMILY MEDICINE | Facility: CLINIC | Age: 69
End: 2019-08-19

## 2019-08-26 ENCOUNTER — TELEPHONE (OUTPATIENT)
Dept: FAMILY MEDICINE | Facility: CLINIC | Age: 69
End: 2019-08-26

## 2019-08-26 ENCOUNTER — CLINICAL SUPPORT (OUTPATIENT)
Dept: FAMILY MEDICINE | Facility: CLINIC | Age: 69
End: 2019-08-26
Payer: MEDICARE

## 2019-08-26 DIAGNOSIS — Z23 ENCOUNTER FOR IMMUNIZATION: Primary | ICD-10-CM

## 2019-08-26 PROCEDURE — G0009 ADMIN PNEUMOCOCCAL VACCINE: HCPCS | Performed by: FAMILY MEDICINE

## 2019-08-26 PROCEDURE — 90732 PPSV23 VACC 2 YRS+ SUBQ/IM: CPT | Performed by: FAMILY MEDICINE

## 2019-08-26 RX ORDER — TRETINOIN 1 MG/G
CREAM TOPICAL
COMMUNITY
Start: 2019-05-29 | End: 2023-05-15 | Stop reason: ENTERED-IN-ERROR

## 2019-08-26 RX ORDER — GUAIFENESIN AND PHENYLEPHRINE HCL 400; 10 MG/1; MG/1
TABLET ORAL AS NEEDED
COMMUNITY
End: 2022-12-07 | Stop reason: ALTCHOICE

## 2019-08-26 NOTE — TELEPHONE ENCOUNTER
rec are to have one dose of prevar and pneumovax after age 65 -  Which she has now had- so she is utd with pneumonia vaccine

## 2019-08-26 NOTE — TELEPHONE ENCOUNTER
Pt was in today for her pneumonia vaccine.  Pt was inquiring if she needs a second vaccine.  She thought sometimes a booster is needed and wanted Dr. Potter's opinion.  Isadora checked the chart and nothing was noted that she could find.  Pt did receive the general info for review that prints on the AVS at check out.

## 2019-08-26 NOTE — PATIENT INSTRUCTIONS
Patient Education   Pneumococcal Vaccine, Polyvalent solution for injection  What is this medicine?  PNEUMOCOCCAL VACCINE, POLYVALENT (RICHAR mo CORINA al pia JUAREZ, mekhi samuel) is a vaccine to prevent pneumococcus bacteria infection. These bacteria are a major cause of ear infections, Strep throat infections, and serious pneumonia, meningitis, or blood infections worldwide. These vaccines help the body to produce antibodies (protective substances) that help your body defend against these bacteria. This vaccine is recommended for people 2 years of age and older with health problems. It is also recommended for all adults over 50 years old. This vaccine will not treat an infection.  This medicine may be used for other purposes; ask your health care provider or pharmacist if you have questions.  COMMON BRAND NAME(S): Pneumovax 23  What should I tell my health care provider before I take this medicine?  They need to know if you have any of these conditions:  -bleeding problems  -bone marrow or organ transplant  -cancer, Hodgkin's disease  -fever  -infection  -immune system problems  -low platelet count in the blood  -seizures  -an unusual or allergic reaction to pneumococcal vaccine, diphtheria toxoid, other vaccines, latex, other medicines, foods, dyes, or preservatives  -pregnant or trying to get pregnant  -breast-feeding  How should I use this medicine?  This vaccine is for injection into a muscle or under the skin. It is given by a health care professional.  A copy of Vaccine Information Statements will be given before each vaccination. Read this sheet carefully each time. The sheet may change frequently.  Talk to your pediatrician regarding the use of this medicine in children. While this drug may be prescribed for children as young as 2 years of age for selected conditions, precautions do apply.  Overdosage: If you think you have taken too much of this medicine contact a poison control center or emergency room at  once.  NOTE: This medicine is only for you. Do not share this medicine with others.  What if I miss a dose?  It is important not to miss your dose. Call your doctor or health care professional if you are unable to keep an appointment.  What may interact with this medicine?  -medicines for cancer chemotherapy  -medicines that suppress your immune function  -medicines that treat or prevent blood clots like warfarin, enoxaparin, and dalteparin  -steroid medicines like prednisone or cortisone  This list may not describe all possible interactions. Give your health care provider a list of all the medicines, herbs, non-prescription drugs, or dietary supplements you use. Also tell them if you smoke, drink alcohol, or use illegal drugs. Some items may interact with your medicine.  What should I watch for while using this medicine?  Mild fever and pain should go away in 3 days or less. Report any unusual symptoms to your doctor or health care professional.  What side effects may I notice from receiving this medicine?  Side effects that you should report to your doctor or health care professional as soon as possible:  -allergic reactions like skin rash, itching or hives, swelling of the face, lips, or tongue  -breathing problems  -confused  -fever over 102 degrees F  -pain, tingling, numbness in the hands or feet  -seizures  -unusual bleeding or bruising  -unusual muscle weakness  Side effects that usually do not require medical attention (report to your doctor or health care professional if they continue or are bothersome):  -aches and pains  -diarrhea  -fever of 102 degrees F or less  -headache  -irritable  -loss of appetite  -pain, tender at site where injected  -trouble sleeping  This list may not describe all possible side effects. Call your doctor for medical advice about side effects. You may report side effects to FDA at 2-845-FDA-6804.  Where should I keep my medicine?  This does not apply. This vaccine is given in a  clinic, pharmacy, doctor's office, or other health care setting and will not be stored at home.  NOTE: This sheet is a summary. It may not cover all possible information. If you have questions about this medicine, talk to your doctor, pharmacist, or health care provider.  © 2018 [employer]/Gold Standard (2009-07-24 14:32:37)

## 2019-08-29 ENCOUNTER — HOSPITAL ENCOUNTER (OUTPATIENT)
Dept: RADIOLOGY | Age: 69
Discharge: HOME | End: 2019-08-29
Attending: FAMILY MEDICINE
Payer: MEDICARE

## 2019-08-29 DIAGNOSIS — M85.89 OSTEOPENIA OF MULTIPLE SITES: ICD-10-CM

## 2019-08-29 DIAGNOSIS — Z12.39 BREAST CANCER SCREENING: ICD-10-CM

## 2019-08-29 PROCEDURE — 77067 SCR MAMMO BI INCL CAD: CPT

## 2019-08-29 PROCEDURE — 77080 DXA BONE DENSITY AXIAL: CPT

## 2019-08-30 ENCOUNTER — TELEPHONE (OUTPATIENT)
Dept: FAMILY MEDICINE | Facility: CLINIC | Age: 69
End: 2019-08-30

## 2019-08-30 NOTE — TELEPHONE ENCOUNTER
----- Message from Sidra Potter MD sent at 8/29/2019  5:06 PM EDT -----  Please call pt dexa ibarra shows osteopenia  - this means mild bone loss not full osteoporosis.    I recommend that she take 500 mg calcium and 1000 iu of vit D.  Weight bearing exercise will help maintain strong bones.   Avoid smoking and excessive alcohol.  Recheck dexa scan in 2 yrs.

## 2019-12-19 ENCOUNTER — DOCTOR'S OFFICE (OUTPATIENT)
Dept: URBAN - METROPOLITAN AREA CLINIC 135 | Facility: CLINIC | Age: 69
Setting detail: OPHTHALMOLOGY
End: 2019-12-19
Payer: COMMERCIAL

## 2019-12-19 DIAGNOSIS — H26.40: ICD-10-CM

## 2019-12-19 DIAGNOSIS — H43.813: ICD-10-CM

## 2019-12-19 DIAGNOSIS — H40.013: ICD-10-CM

## 2019-12-19 DIAGNOSIS — H04.123: ICD-10-CM

## 2019-12-19 PROBLEM — H04.122 DRY EYE; RIGHT EYE, LEFT EYE: Status: ACTIVE | Noted: 2019-12-19

## 2019-12-19 PROBLEM — Z96.1 PRESENCE OF INTRAOCULAR LENS: Status: ACTIVE | Noted: 2018-06-28

## 2019-12-19 PROBLEM — H04.121 DRY EYE; RIGHT EYE, LEFT EYE: Status: ACTIVE | Noted: 2019-12-19

## 2019-12-19 PROCEDURE — 92012 INTRM OPH EXAM EST PATIENT: CPT | Performed by: OPHTHALMOLOGY

## 2019-12-19 ASSESSMENT — REFRACTION_MANIFEST
OS_VA1: 20/20
OS_CYLINDER: -0.50
OS_AXIS: 090
OS_VA3: 20/
OD_AXIS: 115
OS_VA2: 20/
OS_VA1: 20/
OD_CYLINDER: -0.75
OS_VA2: 20/20
OS_SPHERE: PLANO
OD_VA3: 20/
OU_VA: 20/20
OD_VA2: 20/
OD_SPHERE: PLANO
OS_VA3: 20/
OD_VA1: 20/
OU_VA: 20/
OD_ADD: +2.50
OD_VA3: 20/
OD_VA1: 20/20
OD_VA2: 20/20
OS_ADD: +2.50

## 2019-12-19 ASSESSMENT — SUPERFICIAL PUNCTATE KERATITIS (SPK)
OS_SPK: T
OD_SPK: T

## 2019-12-19 ASSESSMENT — REFRACTION_CURRENTRX
OD_OVR_VA: 20/
OS_VPRISM_DIRECTION: PROGS
OD_OVR_VA: 20/
OD_ADD: +2.25
OS_ADD: +2.25
OS_SPHERE: -1.25
OD_VPRISM_DIRECTION: PROGS
OD_SPHERE: -1.50
OD_CYLINDER: -0.25
OS_OVR_VA: 20/
OS_CYLINDER: -0.25
OD_OVR_VA: 20/
OS_OVR_VA: 20/
OS_OVR_VA: 20/
OD_AXIS: 138
OS_AXIS: 125

## 2019-12-19 ASSESSMENT — VISUAL ACUITY
OS_BCVA: 20/40-2
OD_BCVA: 20/40-2

## 2019-12-19 ASSESSMENT — REFRACTION_AUTOREFRACTION
OD_CYLINDER: -0.62
OD_AXIS: 110
OS_AXIS: 8
OS_CYLINDER: -0.62
OD_SPHERE: -0.75
OS_SPHERE: -0.37

## 2019-12-19 ASSESSMENT — SPHEQUIV_DERIVED
OS_SPHEQUIV: -0.68
OD_SPHEQUIV: -1.06

## 2019-12-19 ASSESSMENT — TEAR BREAK UP TIME (TBUT)
OS_TBUT: 1+
OD_TBUT: 1+

## 2020-01-02 NOTE — TELEPHONE ENCOUNTER
Pt phoned in stating she is about to go away until the end of January and will be gone before her mailorder rx is received and was wondering if we could send in a 10 day to her local pharmacy of the topiramate to hold her over until she gets back. I looked at the history and it looks like this was just sent over today to the mailorder pharmacy          Medicine Refill Request    Last Office Visit: 7/8/2019  Next Office Visit: Visit date not found        Current Outpatient Medications:   •  acyclovir (ZOVIRAX) 400 mg tablet, , Disp: , Rfl:   •  aspirin 81 mg chewable tablet, Take 81 mg by mouth daily., Disp: , Rfl:   •  calcium carbonate (CALCIUM 500 ORAL), Take by mouth., Disp: , Rfl:   •  cholecalciferol, vitamin D3, 1,000 unit capsule, Take 1,000 Units by mouth daily., Disp: , Rfl:   •  coenzyme Q10 (COQ10) 100 mg capsule, Take 100 mg by mouth 2 (two) times a day. , Disp: , Rfl:   •  cranberry conc-C-bacillus coag (AZO CRANBERRY + PROBIOTIC) 250-30-50 mg-mg-million tablet, Take by mouth daily., Disp: , Rfl:   •  Lactobac no.41/Bifidobact no.7 (PROBIOTIC-10 ORAL), Take by mouth., Disp: , Rfl:   •  losartan (COZAAR) 25 mg tablet, Take 25 mg by mouth daily., Disp: , Rfl:   •  magnesium chloride 64 mg tablet,delayed release (DR/EC), Take by mouth daily., Disp: , Rfl:   •  multivitamin tablet, Take by mouth daily., Disp: , Rfl:   •  rosuvastatin (CRESTOR) 20 mg tablet, Take 20 mg by mouth daily., Disp: , Rfl:   •  topiramate (TOPAMAX) 25 mg tablet, TAKE 1 TABLET BY MOUTH  DAILY, Disp: 90 tablet, Rfl: 0  •  tretinoin (RETIN-A) 0.1 % cream, APPLY DAILY AT BEDTIME TO FACE, Disp: , Rfl:   •  turmeric root extract 500 mg capsule, Take by mouth daily., Disp: , Rfl:       BP Readings from Last 3 Encounters:   07/08/19 110/80   04/03/19 120/90   02/28/19 133/87       Recent Lab results:  Lab Results   Component Value Date    CHOL 154 06/20/2019   ,   Lab Results   Component Value Date    HDL 53 06/20/2019   ,   Lab Results    Component Value Date    LDLCALC 82 06/20/2019   ,   Lab Results   Component Value Date    TRIG 97 06/20/2019        Lab Results   Component Value Date    GLUCOSE 83 06/20/2019   , No results found for: HGBA1C      Lab Results   Component Value Date    CREATININE 0.94 (H) 06/20/2019       No results found for: TSH

## 2020-01-03 RX ORDER — TOPIRAMATE 25 MG/1
25 TABLET ORAL
Qty: 10 TABLET | Refills: 0 | Status: SHIPPED | OUTPATIENT
Start: 2020-01-03 | End: 2020-03-10 | Stop reason: SDUPTHER

## 2020-03-10 ENCOUNTER — TELEPHONE (OUTPATIENT)
Dept: PRIMARY CARE | Facility: CLINIC | Age: 70
End: 2020-03-10

## 2020-03-10 RX ORDER — TOPIRAMATE 25 MG/1
25 TABLET ORAL
Qty: 10 TABLET | Refills: 0 | Status: SHIPPED | OUTPATIENT
Start: 2020-03-10 | End: 2020-07-07 | Stop reason: SDUPTHER

## 2020-03-10 RX ORDER — LOSARTAN POTASSIUM 25 MG/1
25 TABLET ORAL DAILY
Status: CANCELLED | OUTPATIENT
Start: 2020-03-10

## 2020-03-10 RX ORDER — TOPIRAMATE 25 MG/1
25 TABLET ORAL
Qty: 10 TABLET | Refills: 0 | Status: CANCELLED | OUTPATIENT
Start: 2020-03-10

## 2020-03-10 RX ORDER — IRBESARTAN 75 MG/1
75 TABLET ORAL NIGHTLY
Qty: 30 TABLET | Refills: 6 | Status: SHIPPED | OUTPATIENT
Start: 2020-03-10 | End: 2020-06-30 | Stop reason: SDUPTHER

## 2020-03-10 RX ORDER — ROSUVASTATIN CALCIUM 20 MG/1
20 TABLET, COATED ORAL DAILY
Status: CANCELLED | OUTPATIENT
Start: 2020-03-10

## 2020-03-10 RX ORDER — LOSARTAN POTASSIUM 25 MG/1
25 TABLET ORAL DAILY
Qty: 90 TABLET | Refills: 0 | Status: SHIPPED | OUTPATIENT
Start: 2020-03-10 | End: 2020-06-30 | Stop reason: RX

## 2020-03-10 RX ORDER — ROSUVASTATIN CALCIUM 20 MG/1
20 TABLET, COATED ORAL DAILY
Qty: 90 TABLET | Refills: 0 | Status: SHIPPED | OUTPATIENT
Start: 2020-03-10 | End: 2020-06-30 | Stop reason: SDUPTHER

## 2020-03-10 NOTE — TELEPHONE ENCOUNTER
Please let pt know I changed to irbesartan 75 mg this is in the same class of med and also the lowest dose of this med     Keep ov with me will check BP 4/14

## 2020-03-10 NOTE — TELEPHONE ENCOUNTER
Medicine Refill Request    Last Office Visit: Visit date not found  Next Office Visit: 4/14/2020        Current Outpatient Medications:   •  acyclovir (ZOVIRAX) 400 mg tablet, , Disp: , Rfl:   •  aspirin 81 mg chewable tablet, Take 81 mg by mouth daily., Disp: , Rfl:   •  calcium carbonate (CALCIUM 500 ORAL), Take by mouth., Disp: , Rfl:   •  cholecalciferol, vitamin D3, 1,000 unit capsule, Take 1,000 Units by mouth daily., Disp: , Rfl:   •  coenzyme Q10 (COQ10) 100 mg capsule, Take 100 mg by mouth 2 (two) times a day. , Disp: , Rfl:   •  cranberry conc-C-bacillus coag (AZO CRANBERRY + PROBIOTIC) 250-30-50 mg-mg-million tablet, Take by mouth daily., Disp: , Rfl:   •  Lactobac no.41/Bifidobact no.7 (PROBIOTIC-10 ORAL), Take by mouth., Disp: , Rfl:   •  losartan (COZAAR) 25 mg tablet, Take 25 mg by mouth daily., Disp: , Rfl:   •  magnesium chloride 64 mg tablet,delayed release (DR/EC), Take by mouth daily., Disp: , Rfl:   •  multivitamin tablet, Take by mouth daily., Disp: , Rfl:   •  rosuvastatin (CRESTOR) 20 mg tablet, Take 20 mg by mouth daily., Disp: , Rfl:   •  topiramate (TOPAMAX) 25 mg tablet, Take 1 tablet (25 mg total) by mouth once daily., Disp: 10 tablet, Rfl: 0  •  tretinoin (RETIN-A) 0.1 % cream, APPLY DAILY AT BEDTIME TO FACE, Disp: , Rfl:   •  turmeric root extract 500 mg capsule, Take by mouth daily., Disp: , Rfl:       BP Readings from Last 3 Encounters:   07/08/19 110/80   04/03/19 120/90   02/28/19 133/87       Recent Lab results:  Lab Results   Component Value Date    CHOL 154 06/20/2019   ,   Lab Results   Component Value Date    HDL 53 06/20/2019   ,   Lab Results   Component Value Date    LDLCALC 82 06/20/2019   ,   Lab Results   Component Value Date    TRIG 97 06/20/2019        Lab Results   Component Value Date    GLUCOSE 83 06/20/2019   , No results found for: HGBA1C      Lab Results   Component Value Date    CREATININE 0.94 (H) 06/20/2019       No results found for: TSH

## 2020-03-10 NOTE — TELEPHONE ENCOUNTER
Do you have enough medication for the next 5 days?: yes    Did you request this medication through your pharmacy or our patient portal in the last day or two? na    Name of medication requested: crestor, topamax, cozaar  Medication Strength:na  Mediation Directions: na  Quantity Requested (example 30/90): 90 day  Number of refills requested: na      System Generated Preferred Pharmacy(s)  are as follows.  If more than one pharmacy displays please identify which one should be used for this call    Has the pharmacy information below been confirmed with the patient?  Yes please send to [facility]       [id number] - [place], [place] - [address]  [address]  [facility] [address]  Phone: [phone] Fax: [phone]    [facility] - [address]  [address]  [facility] [address]  Phone: [phone] Fax: [phone]          If necessary, provide pharmacy details below.na     Is this pharmacy a mail order pharmacy?:   Pharmacy Name:   Pharmacy City:   Pharmacy Telephone:     Additional Comments:    Next Encounter with this provider: 4/14/2020

## 2020-03-10 NOTE — TELEPHONE ENCOUNTER
Medicine Refill Request    Last Office Visit: 7/8/2019  Next Office Visit: Visit date not found        Current Outpatient Medications:   •  acyclovir (ZOVIRAX) 400 mg tablet, , Disp: , Rfl:   •  aspirin 81 mg chewable tablet, Take 81 mg by mouth daily., Disp: , Rfl:   •  calcium carbonate (CALCIUM 500 ORAL), Take by mouth., Disp: , Rfl:   •  cholecalciferol, vitamin D3, 1,000 unit capsule, Take 1,000 Units by mouth daily., Disp: , Rfl:   •  coenzyme Q10 (COQ10) 100 mg capsule, Take 100 mg by mouth 2 (two) times a day. , Disp: , Rfl:   •  cranberry conc-C-bacillus coag (AZO CRANBERRY + PROBIOTIC) 250-30-50 mg-mg-million tablet, Take by mouth daily., Disp: , Rfl:   •  Lactobac no.41/Bifidobact no.7 (PROBIOTIC-10 ORAL), Take by mouth., Disp: , Rfl:   •  losartan (COZAAR) 25 mg tablet, Take 25 mg by mouth daily., Disp: , Rfl:   •  magnesium chloride 64 mg tablet,delayed release (DR/EC), Take by mouth daily., Disp: , Rfl:   •  multivitamin tablet, Take by mouth daily., Disp: , Rfl:   •  rosuvastatin (CRESTOR) 20 mg tablet, Take 20 mg by mouth daily., Disp: , Rfl:   •  topiramate (TOPAMAX) 25 mg tablet, Take 1 tablet (25 mg total) by mouth once daily., Disp: 10 tablet, Rfl: 0  •  tretinoin (RETIN-A) 0.1 % cream, APPLY DAILY AT BEDTIME TO FACE, Disp: , Rfl:   •  turmeric root extract 500 mg capsule, Take by mouth daily., Disp: , Rfl:       BP Readings from Last 3 Encounters:   07/08/19 110/80   04/03/19 120/90   02/28/19 133/87       Recent Lab results:  Lab Results   Component Value Date    CHOL 154 06/20/2019   ,   Lab Results   Component Value Date    HDL 53 06/20/2019   ,   Lab Results   Component Value Date    LDLCALC 82 06/20/2019   ,   Lab Results   Component Value Date    TRIG 97 06/20/2019        Lab Results   Component Value Date    GLUCOSE 83 06/20/2019   , No results found for: HGBA1C      Lab Results   Component Value Date    CREATININE 0.94 (H) 06/20/2019       No results found for: TSH

## 2020-03-10 NOTE — TELEPHONE ENCOUNTER
Call from [facility] pharmacy. Losartan currently on back order for 25 & 50 mg.  Valsartan also on back order for 40,80, & 160.    He said they have Irbesartan 75 mg and candesartan 16 mg.

## 2020-04-14 ENCOUNTER — TELEMEDICINE (OUTPATIENT)
Dept: PRIMARY CARE | Facility: CLINIC | Age: 70
End: 2020-04-14
Payer: MEDICARE

## 2020-04-14 DIAGNOSIS — D32.9 MENINGIOMA (CMS/HCC): ICD-10-CM

## 2020-04-14 DIAGNOSIS — E78.2 MIXED HYPERLIPIDEMIA: ICD-10-CM

## 2020-04-14 DIAGNOSIS — I10 ESSENTIAL HYPERTENSION: ICD-10-CM

## 2020-04-14 DIAGNOSIS — C82.00 FOLLICULAR LYMPHOMA GRADE I, UNSPECIFIED BODY REGION (CMS/HCC): Primary | ICD-10-CM

## 2020-04-14 DIAGNOSIS — I25.10 CORONARY ARTERIOSCLEROSIS IN NATIVE ARTERY: ICD-10-CM

## 2020-04-14 DIAGNOSIS — M85.89 OSTEOPENIA OF MULTIPLE SITES: ICD-10-CM

## 2020-04-14 PROBLEM — D32.0 BENIGN NEOPLASM OF CEREBRAL MENINGES (CMS/HCC): Status: RESOLVED | Noted: 2019-04-03 | Resolved: 2020-04-14

## 2020-04-14 PROCEDURE — 99442 PR PHYS/QHP TELEPHONE EVALUATION 11-20 MIN: CPT | Performed by: FAMILY MEDICINE

## 2020-04-14 NOTE — PROGRESS NOTES
Subjective      Patient ID: Muriel Franklin is a 70 y.o. female.      HPI    The following have been reviewed and updated as appropriate in this visit:  Allergies  Meds  Problems          Pt heading back to [place] from [place] - is socially distancing on her boat - heading back to [place] where keep boat     Urine odor no pain no burnign - wondering if something she is eating     constipation disc metamucil hates taking it     miralax too strong but full dose works     Pt with hx of lymphoma due for followup CT and labs     Stable benign meningioma   Hx of CVA   HTN   CAD   High chol   LFT   Osteopenia  dexa 8/29/2019    shingles    She has had followup wit cardio Dr Haas MILD CAD on cath 2014 stabel   Hx of previouis TIA /CVA no new  Neuro sx  possible PFO -  On asa statin and arb    LDL 90  12/11/2019   Lab Results   Component Value Date    WBC 4.5 06/20/2019    HGB 14.2 06/20/2019    HCT 43.5 06/20/2019     06/20/2019    CHOL 154 06/20/2019    TRIG 97 06/20/2019    HDL 53 06/20/2019    ALT 23 06/20/2019    AST 32 06/20/2019     06/20/2019    K 4.3 06/20/2019     06/20/2019    CREATININE 0.94 (H) 06/20/2019    BUN 14 06/20/2019    CO2 24 06/20/2019       Review of Systems      Current Outpatient Medications:   •  acyclovir (ZOVIRAX) 400 mg tablet, , Disp: , Rfl:   •  aspirin 81 mg chewable tablet, Take 81 mg by mouth daily., Disp: , Rfl:   •  calcium carbonate (CALCIUM 500 ORAL), Take by mouth., Disp: , Rfl:   •  cholecalciferol, vitamin D3, 1,000 unit capsule, Take 1,000 Units by mouth daily., Disp: , Rfl:   •  coenzyme Q10 (COQ10) 100 mg capsule, Take 100 mg by mouth 2 (two) times a day. , Disp: , Rfl:   •  cranberry conc-C-bacillus coag (AZO CRANBERRY + PROBIOTIC) 250-30-50 mg-mg-million tablet, Take by mouth daily., Disp: , Rfl:   •  irbesartan (AVAPRO) 75 mg tablet, Take 1 tablet (75 mg total) by mouth nightly., Disp: 30 tablet, Rfl: 6  •  Lactobac no.41/Bifidobact no.7  (PROBIOTIC-10 ORAL), Take by mouth., Disp: , Rfl:   •  losartan (COZAAR) 25 mg tablet, Take 1 tablet (25 mg total) by mouth daily., Disp: 90 tablet, Rfl: 0  •  magnesium chloride 64 mg tablet,delayed release (DR/EC), Take by mouth daily., Disp: , Rfl:   •  multivitamin tablet, Take by mouth daily., Disp: , Rfl:   •  rosuvastatin (CRESTOR) 20 mg tablet, Take 1 tablet (20 mg total) by mouth daily., Disp: 90 tablet, Rfl: 0  •  topiramate (TOPAMAX) 25 mg tablet, Take 1 tablet (25 mg total) by mouth once daily., Disp: 10 tablet, Rfl: 0  •  tretinoin (RETIN-A) 0.1 % cream, APPLY DAILY AT BEDTIME TO FACE, Disp: , Rfl:   •  turmeric root extract 500 mg capsule, Take by mouth daily., Disp: , Rfl:     Past Medical History:   Diagnosis Date   • Hypertension    • Lipid disorder    • Lymphoma, non-Hodgkin's (CMS/HCC)      Past Surgical History:   Procedure Laterality Date   • FOOT SURGERY     • HIP SURGERY     • KNEE SURGERY       Objective     There were no vitals filed for this visit.    There is no height or weight on file to calculate BMI.    Physical Exam    Assessment/Plan   Problem List Items Addressed This Visit        Nervous    Meningioma (CMS/HCC)       Circulatory    Essential hypertension       Musculoskeletal    Osteopenia of multiple sites       Endocrine/Metabolic    Mixed hyperlipidemia       Hematologic    Follicular lymphoma (CMS/HCC) - Primary       Other    Coronary arteriosclerosis in native artery         If BP >140 or < 110 call me     Chol on crestor 20     onc acyclovir antiviral     miralax - use 1/2 or 1/3 capful dose       ALEJANDRINA in July august         Request for Consent:  You and I are about to have a telemedicine check-in or visit. This is allowed because you are already my patient, and you have requested it.  This telemedicine visit will be billed to your health insurance or you, if you are self-insured.  You understand you will be responsible for any copayments or coinsurances that apply to your  telemedicine visit.  Before starting our telemedicine visit, I am required to get your consent for this virtual check-in or visit by telemedicine. Do you consent?      Patient Response to Request for Consent: Yes    The following have been reviewed and updated as appropriate in this visit:  Allergies  Meds  Problems              Time Spent in Medical Discussion During This Encounter:  16 minutes  Sidra Potter MD

## 2020-04-23 ENCOUNTER — TELEPHONE (OUTPATIENT)
Dept: PRIMARY CARE | Facility: CLINIC | Age: 70
End: 2020-04-23

## 2020-04-23 DIAGNOSIS — R82.90 FOUL SMELLING URINE: Primary | ICD-10-CM

## 2020-04-23 NOTE — TELEPHONE ENCOUNTER
Patient leaving a message for Dr Potter regarding their conversation  last week about an issue with her urine having an odor.  After thinking about things she  feels it may be the medication Irbesartan  that  may be causing it.

## 2020-04-23 NOTE — TELEPHONE ENCOUNTER
Spoke with patient, she is aware to stay well hydrated.  She is aware that she should give a urine sample when she returns to the office.  [facility]. I let her know I would fax the order for the urine to have done there when she is getting her labs done.    Faxed orders Via [employer].

## 2020-04-23 NOTE — TELEPHONE ENCOUNTER
I have not heard of that as a side effect but it is possible but if it is I dont this it concerning .  I rec that she stay well hydrated - generally that helps prevent concentrated urine which usually has stronger odor     And I do rec if she is back in the area that she go to the lab and give them a urine specimen for UA and C&S    I think urine bacteria is a more likely cause

## 2020-05-10 LAB
APPEARANCE UR: CLEAR
BACTERIA #/AREA URNS HPF: ABNORMAL /HPF
BACTERIA UR CULT: ABNORMAL
BILIRUB UR QL STRIP: NEGATIVE
COLOR UR: YELLOW
GLUCOSE UR QL STRIP: NEGATIVE
HGB UR QL STRIP: NEGATIVE
HYALINE CASTS #/AREA URNS LPF: ABNORMAL /LPF
KETONES UR QL STRIP: NEGATIVE
LEUKOCYTE ESTERASE UR QL STRIP: ABNORMAL
NITRITE UR QL STRIP: NEGATIVE
PH UR STRIP: 6.5 [PH] (ref 5–8)
PROT UR QL STRIP: NEGATIVE
RBC #/AREA URNS HPF: ABNORMAL /HPF
SP GR UR STRIP: 1.01 (ref 1–1.03)
SQUAMOUS #/AREA URNS HPF: ABNORMAL /HPF
WBC #/AREA URNS HPF: ABNORMAL /HPF

## 2020-05-11 RX ORDER — NITROFURANTOIN 25; 75 MG/1; MG/1
100 CAPSULE ORAL 2 TIMES DAILY
Qty: 14 CAPSULE | Refills: 0 | Status: SHIPPED | OUTPATIENT
Start: 2020-05-11 | End: 2020-05-18

## 2020-05-13 ENCOUNTER — TELEPHONE (OUTPATIENT)
Dept: PRIMARY CARE | Facility: CLINIC | Age: 70
End: 2020-05-13

## 2020-05-13 NOTE — TELEPHONE ENCOUNTER
Pt feels she may need a further pelvic exam because she's had UTI's previously and the way she is currently feeling is out of the norm of what she's experienced in the past . She is taking the prescribed medication but she is having a lot of discomfort. Pt would like to speak with Dr. Potter to see if a visit to the Urologist or Gynecologist may be needed . Please call Pt back at phone # [phone]

## 2020-05-13 NOTE — TELEPHONE ENCOUNTER
Spoke with patient, she states that she is feeling fatigues and this am she had slight discomfort.  She states that when she started with the symptoms, she did not have any other symptom except foul smelling urine.  She states that this has been going on for about 1 month.  I did let her know that the antibiotic prescribed has a high susceptibility to the bacteria.  I let her know to give it more time for the antibiotic to work since she has only take 3 doses so far.  I did let her know that if she if not feeling better after the antibiotic she can go and see the urogyn that she saw in the past.

## 2020-06-30 RX ORDER — ROSUVASTATIN CALCIUM 20 MG/1
20 TABLET, COATED ORAL DAILY
Qty: 90 TABLET | Refills: 0 | Status: SHIPPED | OUTPATIENT
Start: 2020-06-30 | End: 2023-05-15 | Stop reason: ENTERED-IN-ERROR

## 2020-06-30 RX ORDER — IRBESARTAN 75 MG/1
75 TABLET ORAL NIGHTLY
Qty: 90 TABLET | Refills: 0 | Status: SHIPPED | OUTPATIENT
Start: 2020-06-30 | End: 2021-02-01

## 2020-07-07 RX ORDER — TOPIRAMATE 25 MG/1
25 TABLET ORAL DAILY
Qty: 90 TABLET | Refills: 0 | Status: SHIPPED | OUTPATIENT
Start: 2020-07-07 | End: 2020-10-06

## 2020-08-03 ENCOUNTER — OFFICE VISIT (OUTPATIENT)
Dept: PRIMARY CARE | Facility: CLINIC | Age: 70
End: 2020-08-03
Payer: MEDICARE

## 2020-08-03 VITALS
OXYGEN SATURATION: 98 % | WEIGHT: 135.2 LBS | TEMPERATURE: 98.6 F | BODY MASS INDEX: 26.55 KG/M2 | SYSTOLIC BLOOD PRESSURE: 110 MMHG | HEART RATE: 83 BPM | HEIGHT: 60 IN | RESPIRATION RATE: 16 BRPM | DIASTOLIC BLOOD PRESSURE: 70 MMHG

## 2020-08-03 DIAGNOSIS — I10 ESSENTIAL HYPERTENSION: ICD-10-CM

## 2020-08-03 DIAGNOSIS — E55.9 VITAMIN D DEFICIENCY: ICD-10-CM

## 2020-08-03 DIAGNOSIS — Z12.39 BREAST CANCER SCREENING: ICD-10-CM

## 2020-08-03 DIAGNOSIS — C82.00 FOLLICULAR LYMPHOMA GRADE I, UNSPECIFIED BODY REGION (CMS/HCC): ICD-10-CM

## 2020-08-03 DIAGNOSIS — Z00.00 MEDICARE ANNUAL WELLNESS VISIT, SUBSEQUENT: Primary | ICD-10-CM

## 2020-08-03 DIAGNOSIS — K59.09 CHRONIC CONSTIPATION: ICD-10-CM

## 2020-08-03 DIAGNOSIS — Z12.31 ENCOUNTER FOR SCREENING MAMMOGRAM FOR MALIGNANT NEOPLASM OF BREAST: ICD-10-CM

## 2020-08-03 DIAGNOSIS — I25.10 CORONARY ARTERIOSCLEROSIS IN NATIVE ARTERY: ICD-10-CM

## 2020-08-03 DIAGNOSIS — E78.00 PURE HYPERCHOLESTEROLEMIA: ICD-10-CM

## 2020-08-03 PROCEDURE — G0439 PPPS, SUBSEQ VISIT: HCPCS | Performed by: FAMILY MEDICINE

## 2020-08-03 PROCEDURE — 99214 OFFICE O/P EST MOD 30 MIN: CPT | Mod: 25 | Performed by: FAMILY MEDICINE

## 2020-08-03 ASSESSMENT — MINI COG
TOTAL SCORE: 5
COMPLETED: YES

## 2020-08-03 NOTE — PATIENT INSTRUCTIONS
Mammogram due after 8/29/2020    For constipation take magnesium 1 tab daily    If that does not work - can use low dose of miralax daily     Get labs in December                                  Your Personalized Prevention Plan Services (PPPS)    Preventive Services Checklist (Assumes Average Risk Unless Otherwise Noted):    Health Maintenance Topics with due status: Overdue       Topic Date Due    Varicella Vaccines 02/15/1951    Zoster Vaccine 02/15/1999    Influenza Vaccine 08/01/2020     Health Maintenance Topics with due status: Not Due       Topic Last Completion Date    Colonoscopy 11/23/2015    DTaP, Tdap, and Td Vaccines 02/01/2016    Mammogram 08/29/2019    DEXA Scan 08/29/2019     Health Maintenance Topics with due status: Completed       Topic Last Completion Date    Hepatitis C Screening 06/20/2019    Pneumococcal (65 years and older) 08/26/2019    Annual Falls Risk Screening 08/03/2020     Health Maintenance Topics with due status: Aged Out       Topic Date Due    Meningococcal ACWY Aged Out    HIB Vaccines Aged Out    IPV Vaccines Aged Out    HPV Vaccines Aged Out    Pneumococcal Aged Out       You May Be Eligible for These Additional Preventive Services   (Assumes Average Risk Unless Otherwise Noted)  Diabetes Screening Any 1 risk factor: hypertension, dyslipidemia, obesity, high glucose; or Any 2 risk factors: >=66yo, overweight, family history diabetes (covered every 6 months)   Hepatitis C Screening Any 1 risk factor: 1) blood transfusion before 1992,   2) current or past injection drug use (annually for high risk; if born between 9857-8671, see above for status).   Vaccine: Hepatitis B As necessary if at-risk: hemophilia, ESRD, diabetes, living with individual infected with hep B, healthcare worker with frequent contact with blood/bodily fluids (series covered once)   Sexually Transmitted Diseases (STDs) As necessary chlamydia, gonorrhea, syphilis, hepatitis B (covered annually)  HIV if any 1  risk factor present: 1) <16yo or >64yo and at increased risk or 2) 15-64yo and ask for it (covered annually)   Lung Cancer Screening Low dose chest CT if all 3 risk factors: 1) 55-78yo, 2) smoker or quit within last 15y, 3) >=30 pack years (covered annually).  No results found for this or any previous visit.     Cholesterol Screening Both risk factors: 1) >=19yo and 2)  increased risk coronary artery disease (covered every 5 years).     Breast Cancer Screening Covered once 35-38yo, annually >=41yo (if >=51yo, see above for status).         Health Risk Factors with Personalized Education:  ----------------------------------------------------------------------------------------------------------------------  Controlling Your Blood Pressure  · Maintain a normal weight (body mass index between 18.5 and 24.9).  · Eat more fruit, vegetables and low-fat dairy.  · Eat less saturated fat and total fat.  · Lower your sodium (salt) intake.  Try to stay under 1500 mg per day, but if you cannot get your intake to be that low, at least lower it by 1000 mg.  · Stay active.  Try to get at least 90 to 150 minutes of exercise per week.  Try brisk walking, swimming, bicycling or dancing.  · Limit alcohol intake.  When you do consume alcohol, drink no more than 1 drink per day.  · If you have been prescribed medication, take it regularly and exactly as prescribed.  Let your PCP know if you have any problems or questions about your medication.  · Check your blood pressure at home or at the store.  Write down your readings and share them with your PCP  ----------------------------------------------------------------------------------------------------------------------  Controlling Your Cholesterol  · Reduce the amount of saturated and trans fat in your diet.  Limit intake of red meat.  Consume only low-fat or non-fat/skim dairy.  Limit fried food.  Cook with vegetable oils.  · Reduce your intake of sugary foods, sugary drinks and  alcohol.  · Eat a diet high in fruit, vegetables and whole grains.  · Get protein from fish, poultry and a small portion of nuts.  · Stay active.  Try to get at least 90 to 150 minutes of exercise per week.  Try brisk walking, swimming, bicycling or dancing.  · Maintain a healthy weight by balancing your diet and exercise.  · If you have been prescribed medication, take it regularly and exactly as prescribed. Let your PCP know if you have any problems or questions about your medication.  · It’s important to know your cholesterol numbers.  When recommended by your PCP, get the cholesterol blood test.  ----------------------------------------------------------------------------------------------------------------------  Controlling Your Osteopenia, Strengthening Your Bones  · Try to get at least 90 to 150 minutes of weight-bearing exercise per week.  · Ensure intake of at least 1200mg of calcium per day.  Eat foods high in calcium like milk and other dairy, green vegetables, fruit, canned fish with soft and edible bones, nuts, calcium-set tofu.  Some foods are calcium-fortified, like bread, cereal, fruit juices and mineral water.  · Help your body make vitamin D by getting 10-15 minutes per day of sunlight.    · Ensure intake of at least 600IU of vitamin D per day.  Eat foods high in vitamin D like oily fish (salmon, sardines, mackerel) and eggs.  Some foods are fortified with vitamin D, like dairy and cereals.  · Avoid high amounts of caffeine and salt, since they can cause the body to loose calcium.  · Limit alcohol intake, since it is associated with weaker bones and is associated with falls and fractures.  · Limit intake of fizzy drinks.  · If you have been prescribed medication, take it regularly and exactly as prescribed.  Let your PCP know if you have any problems or questions about your  medication  ----------------------------------------------------------------------------------------------------------------------  Reducing Your Risk of Falls  · Tell your PCP if any of your medications make you feel tired, dizzy, lightheaded or off-balance.  · Maintain coordination, flexibility and balance by ensuring regular physical activity.  · Limit alcohol intake to 1 drink per day.  Consider avoiding all alcohol intake.  · Ensure good vision.  Visit an ophthalmologist or optometrist regularly for vision screening or to make sure your glasses / contact lens prescription is correct.  If you need glasses or contacts, wear them.  When you get new glasses or contacts, take time to get used to them.  Do not wear sunglasses or tinted lenses when indoors.  · Ensure good hearing.  Have your hearing checked if you are having trouble hearing, or family and friends think you cannot hear them.  If you need a hearing aid, be sure it fits well and wear it.  · Get enough rest.  Ensure about 7-9 hours of sleep every day.  · Get up slowly from your bed or chairs.  Do not start walking until you are sure you feel steady.  · Wear non-skid, rubber-soled, low-heeled shoes.  Do not walk in socks, or in shoes and slippers with smooth soles.  · If your PCP or therapist recommends using a cane or walker, use it regularly.  · Make your home safer.  Increase lighting throughout the house, especially at the top and bottom of stairs.  Ensure lighting is easily turned on when getting up in the middle of the night.  Make sure there are two secure rails on all stairs.  Install grab bars in the bathtub / shower and near the toilet.  Consider using a shower chair and / or a hand-held shower.  · Spread sand or salt on icy surfaces.  Beware of wet surfaces, which can be icy.  · Tell your PCP if you have fallen.

## 2020-08-03 NOTE — PROGRESS NOTES
Subjective     Muriel Franklin is a 70 y.o. female who presents for a subsequent annual wellness visit.     Patient Care Team:  Sidra Potter MD as PCP - General (Family Medicine)  Jarad Hendrickson MD as Referring Physician  RichardsonGabi rivas MD as Obstetrician (Obstetrics and Gynecology)    Pt with hx of follicular lymphoma ; htn ; high chol tia  Some plaque mild CAD per cath 2014     Chronic constipation   Takes magnesium several times a week ( sounds like high does takes 4 tabs ( 1000calcim 500 mag) when needs to have BM   Dis this is too much particularly too much at once       Has seen GI in past colon 2-15  Dr bobby has rx linzess - caused diarrhea   miralax 1 scoop causes diarrhea     Metamucil she thought she had to take it 3 x a day       Lymphoma has followup with oncologist and CT scheduled s        Dr Valencia last vist dec  Disc she is due to see him per his last nore     LDL 90  Lab Results   Component Value Date    WBC 4.5 06/20/2019    HGB 14.2 06/20/2019    HCT 43.5 06/20/2019     06/20/2019    CHOL 154 06/20/2019    TRIG 97 06/20/2019    HDL 53 06/20/2019    ALT 23 06/20/2019    AST 32 06/20/2019     06/20/2019    K 4.3 06/20/2019     06/20/2019    CREATININE 0.94 (H) 06/20/2019    BUN 14 06/20/2019    CO2 24 06/20/2019         Comprehensive Medical and Social History  Patient Active Problem List   Diagnosis   • Age-related cataract   • Arthropathy   • Coronary arteriosclerosis in native artery   • Dizziness and giddiness   • Elevated LFTs   • Essential hypertension   • Follicular lymphoma (CMS/HCC)   • Gastroenteritis   • Gastro-esophageal reflux disease without esophagitis   • Hyperlipidemia   • Hypertension   • Primary localized osteoarthritis of pelvic region and thigh   • Low back pain   • Lymphocytosis   • Lymphoma of salivary gland (CMS/HCC)   • Meningioma (CMS/HCC)   • Migraine with aura   • Mixed hyperlipidemia   • Nodular lymphoma of lymph nodes of multiple  sites (CMS/HCC)   • Non-Hodgkin's lymphoma of multiple sites (CMS/HCC)   • Secondary malignant neoplasm of liver (CMS/HCC)   • Secondary malignant peritoneal deposit (CMS/HCC)   • Sequelae of cerebral infarction   • Thrombocytopenia (CMS/HCC)   • TIA (transient ischemic attack)   • Drug-induced diarrhea   • Shingles (herpes zoster) polyneuropathy   • Shingles   • Medicare annual wellness visit, subsequent   • Osteopenia of multiple sites     Past Medical History:   Diagnosis Date   • Hypertension    • Lipid disorder    • Lymphoma, non-Hodgkin's (CMS/HCC)      Past Surgical History:   Procedure Laterality Date   • EYE SURGERY     • FOOT SURGERY     • HIP SURGERY     • KNEE SURGERY       Allergies   Allergen Reactions   • Isopropyl Alcohol      Other reaction(s): Blisters    • Sulfa (Sulfonamide Antibiotics)      Other reaction(s): thought to cause TIA  Other reaction(s): foot soreness and blistering of left foot after surgery  Other reaction(s): hematoma and blistering after surgery   • Chlorhexidine Rash     Current Outpatient Medications   Medication Sig Dispense Refill   • acyclovir (ZOVIRAX) 400 mg tablet      • aspirin 81 mg chewable tablet Take 81 mg by mouth daily.     • calcium carbonate (CALCIUM 500 ORAL) Take by mouth.     • cholecalciferol, vitamin D3, 1,000 unit capsule Take 1,000 Units by mouth daily.     • coenzyme Q10 (COQ10) 100 mg capsule Take 100 mg by mouth 2 (two) times a day.      • cranberry conc-C-bacillus coag (AZO CRANBERRY + PROBIOTIC) 250-30-50 mg-mg-million tablet Take by mouth daily.     • irbesartan (AVAPRO) 75 mg tablet Take 1 tablet (75 mg total) by mouth nightly. 90 tablet 0   • Lactobac no.41/Bifidobact no.7 (PROBIOTIC-10 ORAL) Take by mouth.     • magnesium chloride 64 mg tablet,delayed release (DR/EC) Take by mouth daily.     • multivitamin tablet Take by mouth daily.     • rosuvastatin (CRESTOR) 20 mg tablet Take 1 tablet (20 mg total) by mouth daily. 90 tablet 0   • topiramate  "(TOPAMAX) 25 mg tablet Take 1 tablet (25 mg total) by mouth daily. 90 tablet 0   • tretinoin (RETIN-A) 0.1 % cream APPLY DAILY AT BEDTIME TO FACE     • turmeric root extract 500 mg capsule Take by mouth daily.       No current facility-administered medications for this visit.      Social History     Tobacco Use   • Smoking status: Never Smoker   • Smokeless tobacco: Never Used   Substance Use Topics   • Alcohol use: Yes     Alcohol/week: 7.0 standard drinks     Types: 7 Glasses of wine per week   • Drug use: No     Family History   Problem Relation Age of Onset   • Arthritis Biological Mother    • Heart attack Biological Father    • Heart disease Biological Brother        Objective   Vitals  Vitals:    08/03/20 1429   BP: 110/70   BP Location: Right upper arm   Patient Position: Sitting   Pulse: 83   Resp: 16   Temp: 37 °C (98.6 °F)   TempSrc: Oral   SpO2: 98%   Weight: 61.3 kg (135 lb 3.2 oz)   Height: 1.53 m (5' 0.25\")     Body mass index is 26.19 kg/m².    Physical Exam   Constitutional: She is oriented to person, place, and time. She appears well-developed and well-nourished.   HENT:   Head: Normocephalic and atraumatic.   Right Ear: Tympanic membrane, external ear and ear canal normal.   Left Ear: Tympanic membrane, external ear and ear canal normal.   Neck: Normal range of motion. Neck supple. No thyromegaly present.   Cardiovascular: Normal rate, regular rhythm and normal heart sounds. Exam reveals no gallop and no friction rub.   No murmur heard.  Pulmonary/Chest: Effort normal and breath sounds normal. No respiratory distress. She has no wheezes. She has no rales.   Abdominal: Soft. Bowel sounds are normal. She exhibits no distension and no mass. There is no tenderness. There is no guarding.   Musculoskeletal: She exhibits no edema.   Lymphadenopathy:     She has no cervical adenopathy.   Neurological: She is alert and oriented to person, place, and time.   Skin: Skin is dry.   Psychiatric: She has a normal " mood and affect. Her behavior is normal. Judgment and thought content normal.       Advanced Care Plan  Does patient have advance directive?: Yes       Patient has Advance Directive: Patient has Advance Directive, has not brought in                             PHQ  Have You Felt Down, Depressed or Hopeless?: no  Have You Felt Little Interest or Pleasure in Doing Things?: no                                              Mini Cog  Completed: Yes  Score: 5  Result: Negative    Get Up and Go  Result: Pass            STEADI Falls Risk  One or more falls in the last year: No           Has trouble stepping up onto a curb: No   Advised to use a cane or walker to get around safely: No   Often has to rush to the toilet: No   Feels unsteady when walking: No   Has lost some feeling in feet: No   Often feels sad or depressed: No   Steadies self on furniture while walking at home: No   Takes medication that makes him/her feel lightheaded or more tired than usual: No   Worried about falling: No   Takes medicine to sleep or improve mood: No   Needs to push with hands when rising from a chair: No   Falls screen completed: Yes       Hearing and Vision Screening  No exam data present  See HRA for relevant hearing screening response.      Assessment/Plan   Diagnoses and all orders for this visit:    Medicare annual wellness visit, subsequent (Primary)    Chronic constipation  -     FIT Medicare; Future  -     CBC and differential; Future  -     Comprehensive metabolic panel; Future    Follicular lymphoma grade I, unspecified body region (CMS/HCC)    Essential hypertension  -     CBC and differential; Future  -     Comprehensive metabolic panel; Future  -     Lipid panel; Future  -     Vitamin D 25 hydroxy; Future    Pure hypercholesterolemia  -     CBC and differential; Future  -     Comprehensive metabolic panel; Future  -     Lipid panel; Future  -     Vitamin D 25 hydroxy; Future    Breast cancer screening  -     BI SCREENING  MAMMOGRAM BILATERAL; Future    Encounter for screening mammogram for malignant neoplasm of breast   -     BI SCREENING MAMMOGRAM BILATERAL; Future    Vitamin D deficiency  -     Vitamin D 25 hydroxy; Future    Coronary arteriosclerosis in native artery      Mammogram due after 8/29/2020    For constipation take magnesium 1 tab daily    If that does not work - can use low dose of miralax daily     Get labs in December       Disc chronic constipation at length - stop taking wali. Mag 4 tabs when need bm     Disc trial taking mag - 1 tab  once a day see if this keeps her regular     If still constipated take 1/4 or 1/2 scoop of miralax daily ( 1 scoop caused diarrhea in past)     Disc goal is to take enough miralax to keep her regular     followup with GI Dr bobby if needed     Disc also need cardio followup       See Patient Instructions (the written plan) which was given to the patient for PPPS and health risk factors with interventions.

## 2020-08-18 LAB — HEMOCCULT STL QL IA: NORMAL

## 2020-09-01 ENCOUNTER — HOSPITAL ENCOUNTER (OUTPATIENT)
Dept: RADIOLOGY | Age: 70
Discharge: HOME | End: 2020-09-01
Attending: FAMILY MEDICINE
Payer: MEDICARE

## 2020-09-01 DIAGNOSIS — Z12.31 ENCOUNTER FOR SCREENING MAMMOGRAM FOR MALIGNANT NEOPLASM OF BREAST: ICD-10-CM

## 2020-09-01 DIAGNOSIS — Z12.39 BREAST CANCER SCREENING: ICD-10-CM

## 2020-09-01 PROCEDURE — 77063 BREAST TOMOSYNTHESIS BI: CPT

## 2020-10-06 RX ORDER — TOPIRAMATE 25 MG/1
TABLET ORAL
Qty: 90 TABLET | Refills: 0 | Status: SHIPPED | OUTPATIENT
Start: 2020-10-06 | End: 2020-11-24

## 2020-11-24 RX ORDER — TOPIRAMATE 25 MG/1
TABLET ORAL
Qty: 90 TABLET | Refills: 0 | Status: SHIPPED | OUTPATIENT
Start: 2020-11-24 | End: 2021-04-16 | Stop reason: SDUPTHER

## 2020-12-16 LAB
25(OH)D3 SERPL-MCNC: 33 NG/ML (ref 30–100)
ALBUMIN SERPL-MCNC: 4.7 G/DL (ref 3.6–5.1)
ALBUMIN/GLOB SERPL: 2.5 (CALC) (ref 1–2.5)
ALP SERPL-CCNC: 67 U/L (ref 37–153)
ALT SERPL-CCNC: 24 U/L (ref 6–29)
AST SERPL-CCNC: 28 U/L (ref 10–35)
BASOPHILS # BLD AUTO: 41 CELLS/UL (ref 0–200)
BASOPHILS NFR BLD AUTO: 0.9 %
BILIRUB SERPL-MCNC: 0.5 MG/DL (ref 0.2–1.2)
BUN SERPL-MCNC: 16 MG/DL (ref 7–25)
BUN/CREAT SERPL: 16 (CALC) (ref 6–22)
CALCIUM SERPL-MCNC: 9.7 MG/DL (ref 8.6–10.4)
CHLORIDE SERPL-SCNC: 106 MMOL/L (ref 98–110)
CHOLEST SERPL-MCNC: 158 MG/DL
CHOLEST/HDLC SERPL: 2.7 (CALC)
CO2 SERPL-SCNC: 28 MMOL/L (ref 20–32)
CREAT SERPL-MCNC: 0.97 MG/DL (ref 0.6–0.93)
EOSINOPHIL # BLD AUTO: 83 CELLS/UL (ref 15–500)
EOSINOPHIL NFR BLD AUTO: 1.8 %
ERYTHROCYTE [DISTWIDTH] IN BLOOD BY AUTOMATED COUNT: 12.7 % (ref 11–15)
GLOBULIN SER CALC-MCNC: 1.9 G/DL (CALC) (ref 1.9–3.7)
GLUCOSE SERPL-MCNC: 90 MG/DL (ref 65–99)
HCT VFR BLD AUTO: 44.8 % (ref 35–45)
HDLC SERPL-MCNC: 59 MG/DL
HGB BLD-MCNC: 14.4 G/DL (ref 11.7–15.5)
LDLC SERPL CALC-MCNC: 82 MG/DL (CALC)
LYMPHOCYTES # BLD AUTO: 869 CELLS/UL (ref 850–3900)
LYMPHOCYTES NFR BLD AUTO: 18.9 %
MCH RBC QN AUTO: 31.1 PG (ref 27–33)
MCHC RBC AUTO-ENTMCNC: 32.1 G/DL (ref 32–36)
MCV RBC AUTO: 96.8 FL (ref 80–100)
MONOCYTES # BLD AUTO: 534 CELLS/UL (ref 200–950)
MONOCYTES NFR BLD AUTO: 11.6 %
NEUTROPHILS # BLD AUTO: 3073 CELLS/UL (ref 1500–7800)
NEUTROPHILS NFR BLD AUTO: 66.8 %
NONHDLC SERPL-MCNC: 99 MG/DL (CALC)
PLATELET # BLD AUTO: 166 THOUSAND/UL (ref 140–400)
PMV BLD REES-ECKER: 11.9 FL (ref 7.5–12.5)
POTASSIUM SERPL-SCNC: 4.2 MMOL/L (ref 3.5–5.3)
PROT SERPL-MCNC: 6.6 G/DL (ref 6.1–8.1)
QUEST EGFR NON-AFR. AMERICAN: 59 ML/MIN/1.73M2
RBC # BLD AUTO: 4.63 MILLION/UL (ref 3.8–5.1)
SODIUM SERPL-SCNC: 142 MMOL/L (ref 135–146)
TRIGL SERPL-MCNC: 88 MG/DL
WBC # BLD AUTO: 4.6 THOUSAND/UL (ref 3.8–10.8)

## 2021-02-01 ENCOUNTER — TELEMEDICINE (OUTPATIENT)
Dept: PRIMARY CARE | Facility: CLINIC | Age: 71
End: 2021-02-01
Payer: MEDICARE

## 2021-02-01 DIAGNOSIS — I10 ESSENTIAL HYPERTENSION: ICD-10-CM

## 2021-02-01 DIAGNOSIS — C85.98: ICD-10-CM

## 2021-02-01 DIAGNOSIS — M75.51 BILATERAL SHOULDER BURSITIS: Primary | ICD-10-CM

## 2021-02-01 DIAGNOSIS — R04.0 FREQUENT EPISTAXIS: ICD-10-CM

## 2021-02-01 DIAGNOSIS — D32.9 MENINGIOMA (CMS/HCC): ICD-10-CM

## 2021-02-01 DIAGNOSIS — E78.2 MIXED HYPERLIPIDEMIA: ICD-10-CM

## 2021-02-01 DIAGNOSIS — M75.52 BILATERAL SHOULDER BURSITIS: Primary | ICD-10-CM

## 2021-02-01 DIAGNOSIS — K21.9 GASTRO-ESOPHAGEAL REFLUX DISEASE WITHOUT ESOPHAGITIS: ICD-10-CM

## 2021-02-01 DIAGNOSIS — D69.6 THROMBOCYTOPENIA (CMS/HCC): ICD-10-CM

## 2021-02-01 PROBLEM — E78.5 HYPERLIPIDEMIA: Status: RESOLVED | Noted: 2019-04-03 | Resolved: 2021-02-01

## 2021-02-01 PROCEDURE — 99214 OFFICE O/P EST MOD 30 MIN: CPT | Mod: 95 | Performed by: FAMILY MEDICINE

## 2021-02-01 RX ORDER — ALPRAZOLAM 0.5 MG/1
0.5 TABLET ORAL NIGHTLY PRN
Qty: 15 TABLET | Refills: 0 | Status: SHIPPED | OUTPATIENT
Start: 2021-02-01 | End: 2021-03-03

## 2021-02-01 NOTE — PROGRESS NOTES
Verification of Patient Location:  The patient affirms they are currently located in the following state: [place]    Request for Consent:    Video Encounter   Melodie, my name is Sidra Potter MD.  Before we proceed, can you please verify your identification by telling me your full name and date of birth?  Can you tell me who is in the room with you?    You and I are about to have a telemedicine check-in or visit because you have requested it.  This is a live video-conference.  I am a real person, speaking to you in real time.  There is no one else with me on the video-conference.  However, when we use ([employer], [employer], etc) it is important for you to know that the video-conference may not be secure or private.  I am not recording this conversation and I am asking you not to record it.  This telemedicine visit will be billed to your health insurance or you, if you are self-insured.  You understand you will be responsible for any copayments or coinsurances that apply to your telemedicine visit.  Communication platform used for this encounter:  [employer]     Before starting our telemedicine visit, I am required to get your consent for this virtual check-in or visit by telemedicine. Do you consent?      Patient Response to Request for Consent:  Yes      Visit Documentation:  Subjective     Patient ID: Muriel Franklin is a 70 y.o. female.  1950      HPI    The following have been reviewed and updated as appropriate in this visit:  Allergies  Meds  Problems        Pt with  Hx of htn high chol CAD and     1.shoulder pain -bilateral for pat 6 mos or more    zoom work out pain in bilateral shoulders biceps deltoid- not in neck   At home just doing 4 lbs   Taking CO 10     Had first vaccine 1/24    And is schedules for second feb 21  [facility]     2. Bloody nose occasionally  using ocean spray  Occasionally only   Did have nasal cautery done in itally       3. Constipation   Kaya   What he prescribes is too strong   What she is using is working     4. Anxiety needs 1/2 xanax occasionally  Wants refill   Last rx lasted yrs     5. BP readings are good on low side 115/80   Stopped irbesartan         Vit D 33  LDL 82  Good range on med    Lab Results   Component Value Date    WBC 4.6 12/15/2020    HGB 14.4 12/15/2020    HCT 44.8 12/15/2020     12/15/2020    CHOL 158 12/15/2020    TRIG 88 12/15/2020    HDL 59 12/15/2020    ALT 24 12/15/2020    AST 28 12/15/2020     12/15/2020    K 4.2 12/15/2020     12/15/2020    CREATININE 0.97 (H) 12/15/2020    BUN 16 12/15/2020    CO2 28 12/15/2020         Review of Systems      Assessment/Plan   Diagnoses and all orders for this visit:    Bilateral shoulder bursitis (Primary)  -     X-RAY SHOULDER BILATERAL 2+VW; Future  -     Ambulatory referral to Physical Therapy; Future    Frequent epistaxis    Essential hypertension    Gastro-esophageal reflux disease without esophagitis    Thrombocytopenia (CMS/HCC)    Non-Hodgkin's lymphoma of multiple sites (CMS/HCC)    Meningioma (CMS/HCC)    Mixed hyperlipidemia    Other orders  -     ALPRAZolam (XANAX) 0.5 mg tablet; Take 1 tablet (0.5 mg total) by mouth nightly as needed for anxiety.       Disc GI followup   Disc onc followup   Neuro surger followup    Nose bleed - nasal saline if persists  call ent gave phone number for Dr George/Wayne    shoulder pain get xray  And do PT disc importance call if sx worsen     Takes melatonin for sleep  -      Take vit D increase dose add 1000IU         Time Spent:  I spent 32 minutes on this date of service performing the following activities: obtaining history, entering orders, documenting, preparing for visit, obtaining / reviewing records, providing counseling and education and communicating results.

## 2021-02-03 ENCOUNTER — TELEPHONE (OUTPATIENT)
Dept: PRIMARY CARE | Facility: CLINIC | Age: 71
End: 2021-02-03

## 2021-02-03 DIAGNOSIS — M75.52 BILATERAL SHOULDER BURSITIS: Primary | ICD-10-CM

## 2021-02-03 DIAGNOSIS — M75.51 BILATERAL SHOULDER BURSITIS: Primary | ICD-10-CM

## 2021-02-03 NOTE — TELEPHONE ENCOUNTER
Spoke to pt and informed new rx placed for therapy. Pt did not have fax # for [facility]. Informed pt will confirm fax and send rx. Pt understood.

## 2021-02-03 NOTE — TELEPHONE ENCOUNTER
Pt called to state the therapy rx must be for occupational therapy.  She is requesting a new rx be sent right away as they are waiting on her to call back today.  She request a callback once completed.

## 2021-02-05 ENCOUNTER — HOSPITAL ENCOUNTER (OUTPATIENT)
Dept: RADIOLOGY | Age: 71
Discharge: HOME | End: 2021-02-05
Attending: FAMILY MEDICINE
Payer: MEDICARE

## 2021-02-05 DIAGNOSIS — M75.51 BILATERAL SHOULDER BURSITIS: ICD-10-CM

## 2021-02-05 DIAGNOSIS — M75.52 BILATERAL SHOULDER BURSITIS: ICD-10-CM

## 2021-02-05 PROCEDURE — 73030 X-RAY EXAM OF SHOULDER: CPT | Mod: 50

## 2021-02-08 ENCOUNTER — HOSPITAL ENCOUNTER (OUTPATIENT)
Dept: OCCUPATIONAL THERAPY | Age: 71
Setting detail: THERAPIES SERIES
Discharge: HOME | End: 2021-02-08
Attending: NURSE PRACTITIONER
Payer: MEDICARE

## 2021-02-08 DIAGNOSIS — M75.52 BILATERAL SHOULDER BURSITIS: ICD-10-CM

## 2021-02-08 DIAGNOSIS — M75.51 BILATERAL SHOULDER BURSITIS: ICD-10-CM

## 2021-02-08 PROCEDURE — 97535 SELF CARE MNGMENT TRAINING: CPT | Mod: GO

## 2021-02-08 PROCEDURE — 97165 OT EVAL LOW COMPLEX 30 MIN: CPT | Mod: GO

## 2021-02-08 NOTE — OP OT TREATMENT LOG
SHOULDER OT FLOW SHEET    EXERCISES CURRENT SESSION TIME   SELF-CARE TOTAL TIME FOR SESSION 08-22 Minutes    education/proper positioning  02/28-discussed proper sleeping/sitting positions w/use of pillows & gave HO                   THER EX TOTAL TIME FOR SESSION Not Performed    PROM RUE      UBE      pulleys      dowel stretches BUE                                    FF                          Abd                           Ext                          ER      pendulum      IR Stretch      Post cap S      Inferior cap S     Bicep/chest S      wrist S    flexor w/forearm sup & pron   extensor            Wall climb/finger ladder      Lat flex      Lev scap           DB exercises   FF   scaption   Abd  Bicep curls  forearm     Ball on wall ex  Punches  Circles, reverse  Alphabet writing      Wall push ups   table/plinth push ups   mat push ups      planks      MB exercises       TB flex          Abd          scaption          IR          ER          scap retraction          B Ext      TB  tricep          Lat pull downs          B horiz abd          Diagonals      DB prone rows                   Ext                   horiz abd        DB supine punch ups                       Triceps                       FF       DB side lying IR                          ER      sleeper S           RA ROM BUE      RA str BUE      PSS  (IE 67/100)     MODALITIES TOTAL TIME FOR SESSION Not Performed     MH B shoulder      ice B shoulder                     THER ACT TOTAL TIME FOR SESSION  Not Performed

## 2021-02-08 NOTE — PROGRESS NOTES
Referring Provider: By co-signing this Plan of Care (POC) either electronically or physically you agree to the following:    I have reviewed the the Plan of Care established by the therapist within this document and certify that the services are skilled and medically necessary. I have reviewed the plan and recommend that these services continue to meet the goals stated in this document.       EXTERNAL PROVIDER FAXING BACK:    PHYSICIAN SIGNATURE: __________________________________     DATE: ___________________    IMPORTANT:  If returning this Plan of Care by fax, please fax back ONLY the signature page.   _____________________________________________________________________      [facility] Fax: [phone]      OT EVALUATION FOR OUTPATIENT THERAPY    Patient: Muriel Franklin   MRN: [id number]  : 1950 70 y.o.  Referring Physician: Isadora Rebolledo CR*  Date of Visit: 2021    Certification Dates:   21 through 21    Recommended Frequency & Duration:  2 times/week for up to 3 months     Diagnosis:   1. Bilateral shoulder bursitis            Chief Complaints:   Chief Complaint   Patient presents with   • Dec ROM   • Dec Strength   • Pain   • Self Care Difficulties   • Decreased recreational/play activity       Precautions: no known precautions/restrictions    Past Medical History:   Past Medical History:   Diagnosis Date   • Hypertension    • Lipid disorder    • Lymphoma, non-Hodgkin's (CMS/HCC)        Past Surgical History:   Past Surgical History:   Procedure Laterality Date   • EYE SURGERY     • FOOT SURGERY     • HIP SURGERY     • KNEE SURGERY           LEARNING ASSESSMENT    Assessment completed: Yes    Learner name:  Muriel Franklin    Relationship: Patient    Learning Barriers:  Learning barriers: No Barriers    Preferred Language: English     Needed: No    Learning New Concepts: Listening, Reading, Demonstration and Pictures/Video        OBJECTIVE  MEASUREMENTS/DATA:    Eval Assessment    Evaluation Assessment and Plan - 02/08/21 1137        Evaluation Assessment and Plan    Plan of Care reviewed and patient/family in agreement  Yes     System Pathology/Pathophysiology Noted  musculoskeletal     Functional Limitations in Following Categories  self-care;home management;community/leisure     Problem List: Occupational Therapy  ROM decreased;strength decreased;pain     Rehab Potential/Prognosis: Occupational Therapy  good, to achieve stated therapy goals     Clinical Assessment  This 69 yo female was seen for OT evalution today.  She presents w/increased pain BUE, R shoulder pain greater than L but L upper arm (bicep/tricep/mid deltoid) pain greater than R, decreased pain free ROM BUE, R>L, decreased strength BUE, decreased pain free functional use BUE, impeding her ability to perform BADLs/IADLs & recreational tasks, sleeping is also painful for her. She will benefit from OT to address above deficits for return to PLOF, pain free     Planned Services  CPT 11496 Therapeutic exercises;CPT 47018 Therapeutic activities;CPT 30364 Self-care/Home management training;CPT 72449 Neuromuscular Reeducation;CPT 78705 Manual therapy;CPT 02523 Hot/Cold Packs therapy;CPT 92247 Ultrasound         General Information    General Information - 02/08/21 0906        General Information    Document Type  initial evaluation     Referring Physician  Dr Potter     Pertinent History of Current Functional Problem  Pt reports pain L upper arm (bicep/tricep/mid delt area) on & off for ~1 year then ~9 months ago, intermittent pain started in R shoulder/upper arm which has become constant w/in the past 6 months or so, pt notes R shoulder pain>L shoulder but L upper arm pain>R upper arm pain, pt was going to gym 3x week & due to COVID has been doing work out on [employer] w/, she was using 10#DB over head & has decreased weight to 4# or no weight but is still having pain, also notes  "she started Yoga a few weeks ago, she had 6 month f-u appt w/PCP on 02/01/21, discussed shoulder issues, XRays were ordered which reveaed (-)fx or dislocation but moderate DJD B shoulders & pt was referred to OT, pt notes resting pain  2/10, w/use 3/10, ^to 6-7/10 at worst, asael when lifting overhead pending weight & w/sleeping     Existing Precautions/Restrictions  no known precautions/restrictions        Time Calculation    Start Time  0905     Stop Time  0945     Time Calculation (min)  40 min         Pain and Vitals   Pain/Vitals - 02/08/21 1733        Pain Assessment    Currently in pain  Yes     Preferred Pain Scale  number (Numeric Rating Pain Scale)     Pain: Body location  Shoulder    both    Pain Rating (0-10): Pre Activity  2     Pain Rating (0-10): Post Activity  2        Pain Interventions    Intervention   reviewed proper sleeping/sitting position w/use of pillows & discussed use of heat/ice for pain relief     Post Intervention Comments  pt verbalized understanding of proper positioning & notes she uses cervical \"warmers everyday t/o the day\"         Type and Frequency:   OT - 02/08/21 1118        Occupational Therapy Encounter Type Details    Occupational Therapy  Ortho        OT Frequency and Duration    Frequency of treatment  2 times/week     OT Duration  3 months     OT Cert From  02/08/21     OT Cert To  05/03/21     Date OT POC was sent to provider  02/08/21     Signed OT Plan of Care received?   No         PLOF:   Prior Level of Function - 02/08/21 1127        OTHER    Previous level of function  I w/all BADLs/IADLs pain free, (+) driving, retired          Living Environment   Living Environment - 02/08/21 0915        Living Environment    Living Environment Comment  lives w/ in 3 [facility]         Falls Assessment   Falls Assessment - 02/08/21 0906        Initial Falls Assessment    One or more falls in the last year  No         Range of Motion    PROM/AROM - " 02/08/21 1100        RIGHT: Upper Extremity PROM Assessment    Shoulder Flexion Deficit  155 degrees    pain 3/10    Shoulder Abduction Deficit  140 degrees    pain 2/10    Shoulder Internal Rotation Deficit  85 degrees     Shoulder External Rotation Deficit  80 degrees    pain 2/10       RIGHT: Upper Extremity AROM Assessment    Shoulder Flexion Deficit  125 degrees    pain 4/10 @125 deg & w/descending motion starting @80    Shoulder Abduction Deficit  130 degrees    pain 3/10 & w/descending motion from ~75>40 deg    Shoulder Internal Rotation Deficit  55 degrees    pain 3/10, just below bra line    Shoulder External Rotation Deficit  70 degrees     Elbow Flexion/Extension Deficit  --    flex WNL,ext WFL,pain w/arm extended at~60 deg FF & above     Forearm Supination Deficit  --    WNL    Forearm Pronation Deficit  --    WNL    Wrist Flexion Deficit  --    WNL    Wrist Extension Deficit  --    WNL       LEFT: Upper Extremity AROM Assessment    Left UE AROM normal  --    painful w/ER/ABD combo motion    Shoulder Flexion Deficit  160 degrees     Shoulder Abduction Deficit  160 degrees     Shoulder Internal Rotation Deficit  45 degrees    *most painful (3-4/10), to pant line    Shoulder External Rotation Deficit  70 degrees    pain 2-3/10 upper arm w/descending motion    Elbow Flexion/Extension Deficit  --    flex WNL,ext WFL,pain w/arm extended at~60 deg FF & above     Forearm Supination Deficit  --    WNL    Forearm Pronation Deficit  --    WNL    Wrist Flexion Deficit  --    WNL    Wrist Extension Deficit  --    WNL        MMT    Manual Muscle Tests - 02/08/21 1150        RIGHT: Upper Extremity Manual Muscle Test Assessment    Shoulder Flexion gross movement  (4-/5) good minus     Shoulder Abduction gross movement  (4-/5) good minus     Shoulder Internal Rotation gross movement  (4-/5) good minus     Shoulder External Rotation gross movement  (4-/5) good minus     Elbow Flex gross movement  (4+/5) good plus      "Elbow Extension gross movement  (4+/5) good plus     Forearm Supination gross movement  (4-/5) good minus    pain 2/10 in shoulder    Forearm Pronation gross movement  (4/5) good     Wrist Flexion gross movement  (4/5) good     Wrist Extension gross movement  (4/5) good        LEFT: Upper Extremity Manual Muscle Test Assessment    Shoulder Flexion gross movement  (3/5) fair     Shoulder Abduction gross movement  (3+/5) fair plus     Shoulder Internal Rotation gross movement  (3+/5) fair plus     Shoulder External Rotation gross movement  (3+/5) fair plus     Elbow Flexion gross movement  (4-/5) good minus     Elbow Extension gross movement  (4-/5) good minus     Forearm Supination gross movement  (4/5) good     Forearm Pronation gross movement  (4/5) good     Wrist Flexion gross movement  (4-/5) good minus     Wrist Extension gross movement  (4/5) good         BADL/IADL   BADL/IADL - 02/08/21 1153        BADL Interventions Assessment    Upper Body Dressing  Modified independence     Upper body dressing comments  min pain L upper arm reaching behind back to don bra & w/donning coat     Lower Body Dressing  Modified independence     Lower body dressing comments  min pain pulling up pants     Bathing  Modified independence     Bathing comments  min pain (2-3/10) in R shoulder washing L shoulder, min pain (3/10) in L upper arm washing back     Toileting  Independent     Toileting comments  no pain     Grooming  Modified independence     Grooming comments  painful in L holding hair dryer     Eating  Independent     Eating comments  no issues        IADL/Home Interventions Assessment    Driving and community mobility comments  no pain driving     Home management/maintenance comments  pt \"thinks\" she is compensating, all housework is painfu pending task, asael over head lifting, able to carry light grocery bags     Meal prep / clean up comments  notes she needs to \"put more effort into straining pot of water\", painful " "reaching into cabinet for pot, needs to use 2 hands, painful lifting 1/2 gal     Other (comments)  PSS 67/100 (18/30 pain, 3/10 satisfaction, 46/60 function), pt works out, 3xweek (was going to gym but due to COVID, does work out on [employer] w/), was using 10#DB for over head but has decreased to 4# or no weight 2*pain, notes she has been doing planks but painful & she recently started Yoga, pt notes sleeping is the most painful, asael in R shoulder, she mostly sleeps prone position, or on either side at times, notes much difficulty getting comfortable, pt notes she wears cervical warmers heating pad everyday t/o the day             GOALS:    Goals        Patient Stated    • pt stated (pt-stated)      \"I want the pain to go away, I want to be able to do things I did before w/o pain & do my work outs w/o pain\"         Other    • OT STGs/LTGs        Short Term Goals Time   Frame Result Comment/Progress   Pt will be Supervision with HEP 2 weeks     Increase P/AROM of RUE by 10 degrees to increase independence and ease with overhead ADLs 6 weeks     Increase strength BUE by ½ grade to increase ease with ADLs, household activities and  recreational tasks 6 weeks     Increase functional use of BUE to WFL to increase independence and ease with ADLs, household activities and recreational tasks 6 weeks     Decrease pain to 2/10 with use and 5/10 at worst 6 weeks     Decrease PSS to 72% 6 weeks       Long Term Goals Time  Frame Result Comment/Progress   Pt will be independent with HEP 12 weeks     Patient will increase P/AROM BUE to WNL to increase independence and ease with overhead ADLs 12 weeks     Increase ability to lift 8lbs with RUE to lift gallon of milk and pour coffee pain free 12 weeks     Increase strength RUE to 5/5 & LUE to 4+/5 to increase independence and ease with ADLs, household activities and  recreational tasks 12 weeks     Increase functional use of BUE to WNL pain free 12 weeks     No " disability on PSS  12 weeks       Pt will be able to perform work outs including planks pain free 12 weeks     Decrease pain in BUE to 0/10 12 weeks                                                         TREATMENT PLAN:      SHOULDER OT FLOW SHEET    EXERCISES CURRENT SESSION TIME   SELF-CARE TOTAL TIME FOR SESSION 08-22 Minutes    education/proper positioning  02/28-discussed proper sleeping/sitting positions w/use of pillows & gave HO                   THER EX TOTAL TIME FOR SESSION Not Performed    PROM RUE      UBE      pulleys      dowel stretches BUE                                    FF                          Abd                           Ext                          ER      pendulum      IR Stretch      Post cap S      Inferior cap S     Bicep/chest S      wrist S    flexor w/forearm sup & pron   extensor            Wall climb/finger ladder      Lat flex      Lev scap           DB exercises   FF   scaption   Abd  Bicep curls  forearm     Ball on wall ex  Punches  Circles, reverse  Alphabet writing      Wall push ups   table/plinth push ups   mat push ups      planks      MB exercises       TB flex          Abd          scaption          IR          ER          scap retraction          B Ext      TB  tricep          Lat pull downs          B horiz abd          Diagonals      DB prone rows                   Ext                   horiz abd        DB supine punch ups                       Triceps                       FF       DB side lying IR                          ER      sleeper S           RA ROM BUE      RA str BUE      PSS  (IE 67/100)     MODALITIES TOTAL TIME FOR SESSION Not Performed     MH B shoulder      ice B shoulder                     THER ACT TOTAL TIME FOR SESSION  Not Performed                                  This 70 y.o. year old female presents to OT with above stated diagnosis. Occupational Therapy evaluation reveals ROM decreased, strength decreased, pain resulting in self-care, home  management, community/leisure limitations. Muriel Franklin will benefit from skilled OT services to address limitation, work towards rehab and patient goals and maximize PLOF of chosen ADLs.     Planned Services: The patient’s treatment will include CPT 08189 Therapeutic exercises, CPT 80333 Therapeutic activities, CPT 51599 Self-care/Home management training, CPT 29377 Neuromuscular Reeducation, CPT 81122 Manual therapy, CPT 67377 Hot/Cold Packs therapy, CPT 95674 Ultrasound, .

## 2021-02-12 ENCOUNTER — HOSPITAL ENCOUNTER (OUTPATIENT)
Dept: OCCUPATIONAL THERAPY | Age: 71
Setting detail: THERAPIES SERIES
Discharge: HOME | End: 2021-02-12
Attending: NURSE PRACTITIONER
Payer: MEDICARE

## 2021-02-12 DIAGNOSIS — M75.51 BILATERAL SHOULDER BURSITIS: Primary | ICD-10-CM

## 2021-02-12 DIAGNOSIS — M75.52 BILATERAL SHOULDER BURSITIS: Primary | ICD-10-CM

## 2021-02-12 PROCEDURE — 97010 HOT OR COLD PACKS THERAPY: CPT | Mod: GO

## 2021-02-12 PROCEDURE — 97110 THERAPEUTIC EXERCISES: CPT | Mod: GO

## 2021-02-12 NOTE — OP OT TREATMENT LOG
SHOULDER OT FLOW SHEET    EXERCISES CURRENT SESSION TIME   SELF-CARE TOTAL TIME FOR SESSION Not Performed    education/proper positioning  02/28-discussed proper sleeping/sitting positions w/use of pillows & gave HO                   THER EX TOTAL TIME FOR SESSION 38-52 MInutes    PROM RUE      UBE  02/12- L 1.5, 3 min F, 3 min B     pulleys      dowel stretches BUE                                    FF                          Abd                           Ext                          ER    02/12 FF 3x15 sec H   02/12- Abd 3x 5 sec H   02/12- Ext 3x15 sec H     pendulum      IR Stretch  02/12- IR strap S 3x15 sec H     Post cap S  02/12- post cap S B UE 3x15 sec H      Inferior cap S  02/12- inferior cap S BUE 3x15 sec H    Bicep/chest S      wrist S    flexor w/forearm sup & pron   extensor            Wall climb/finger ladder      Lat flex      Lev scap           DB exercises BUE   FF   scaption   Abd   Bicep curls   Forearm    press up              02/12- press up w/1# 2x10    Ball on wall ex  Punches  Circles, reverse  Alphabet writing      Wall push ups   table/plinth push ups   mat push ups      planks      MB exercises       TB BUE           flex          Abd          scaption          IR          ER          scap retraction          B Ext     02/12- FF red 2x10   02/12- Abd yellow 2x10   02/12- scaption yellow 2x10   02/12- IR yellow 1x10   02/12- ER w/yellow 1x10   02/12- Scap retr red 2x10   02/12- B Ext red 2x10     TB  tricep          Lat pull downs          B horiz abd          Diagonals      02/12- B horiz abd yellow 1x10     DB prone rows                   Ext                   horiz abd        DB supine punch ups                       Triceps                       FF  02/12-punch ups 2#2x10      DB side lying IR                          ER      sleeper S           RA ROM BUE      RA str BUE      PSS  (IE 67/100)     MODALITIES TOTAL TIME FOR SESSION 08-22 Minutes     MH B shoulder   02/12- MH B shoulders x 8 min     ice B shoulder  02/12- ice B shoulders x 5 min                    THER ACT TOTAL TIME FOR SESSION  Not Performed

## 2021-02-13 NOTE — PROGRESS NOTES
OT DAILY NOTE FOR OUTPATIENT THERAPY    Patient: Muriel Franklin   MRN: [id number]  : 1950 70 y.o.  Referring Physician: Isadora Rebolledo CR*  Date of Visit: 2021      Certification Dates: 21 through 21    Diagnosis:   1. Bilateral shoulder bursitis        Chief Complaints:   Dec ROM, Dec Strength, Pain, Self Care Difficulties, Decreased recreational/play activity    Precautions:  Existing Precautions/Restrictions: no known precautions/restrictions    TODAY'S VISIT    History/Vitals/Pain/Encounter Info - 21 1327        Injury History/Precautions/Daily Required Info    Document Type  daily treatment     Primary Therapist  Pinky Arnett, OTR/L     Chief Complaint/Reason for Visit   Dec ROM, Dec Strength, Pain, Self Care Difficulties, Decreased recreational/play activity     Referring Physician  Dr Potter     Existing Precautions/Restrictions  no known precautions/restrictions     Pertinent History of Current Functional Problem  Pt reports pain L upper arm (bicep/tricep/mid delt area) on & off for ~1 year then ~9 months ago, intermittent pain started in R shoulder/upper arm which has become constant w/in the past 6 months or so, pt notes R shoulder pain>L shoulder but L upper arm pain>R upper arm pain, pt was going to gym 3x week & due to COVID has been doing work out on [employer] w/, she was using 10#DB over head & has decreased weight to 4# or no weight but is still having pain, also notes she started Yoga a few weeks ago, she had 6 month f-u appt w/PCP on 21, discussed shoulder issues, XRays were ordered which reveaed (-)fx or dislocation but moderate DJD B shoulders & pt was referred to OT, pt notes resting pain  2/10, w/use 3/10, ^to 6-7/10 at worst, asael when lifting overhead pending weight & w/sleeping     Patient/Family/Caregiver Comments/Observations  Pt notes she was sore on tuesday (, the day after IE) & took aleve which helped, pt reports pain  reaching for seat belt & opening/closing car door     Start Time  1305     Stop Time  1410     Time Calculation (min)  65 min     Patient reported fall since last visit  No        Pain Assessment    Currently in pain  No/Denies        Pain Interventions    Intervention   MH B shoulders for warm up, TE, ice     Post Intervention Comments  no pain         Daily Treatment Assessment and Plan - 02/12/21 2125        Daily Treatment Assessment and Plan    Progress toward goals  Progressing     Daily Outcome Summary  Tx initiated per sheet, tactile cueing required during various exercises to avoid trunk & shoulder substitution, gave pt yellow & red TB for home w/pictures of  exercises/stretches, instructed her to perform in pain free ranges, pt verbalized understanding     Plan and Recommendations  con't w/POC         Today's Treatment:      SHOULDER OT FLOW SHEET    EXERCISES CURRENT SESSION TIME   SELF-CARE TOTAL TIME FOR SESSION Not Performed    education/proper positioning  02/28-discussed proper sleeping/sitting positions w/use of pillows & gave HO                   THER EX TOTAL TIME FOR SESSION 38-52 MInutes    PROM RUE      UBE  02/12- L 1.5, 3 min F, 3 min B     pulleys      dowel stretches BUE                                    FF                          Abd                           Ext                          ER    02/12 FF 3x15 sec H   02/12- Abd 3x 5 sec H   02/12- Ext 3x15 sec H     pendulum      IR Stretch  02/12- IR strap S 3x15 sec H     Post cap S  02/12- post cap S B UE 3x15 sec H      Inferior cap S  02/12- inferior cap S BUE 3x15 sec H    Bicep/chest S      wrist S    flexor w/forearm sup & pron   extensor            Wall climb/finger ladder      Lat flex      Lev scap           DB exercises BUE   FF   scaption   Abd   Bicep curls   Forearm    press up              02/12- press up w/1# 2x10    Ball on wall ex  Punches  Circles, reverse  Alphabet writing      Wall push ups    table/plinth push ups   mat push ups      planks      MB exercises       TB BUE           flex          Abd          scaption          IR          ER          scap retraction          B Ext     02/12- FF red 2x10   02/12- Abd yellow 2x10   02/12- scaption yellow 2x10   02/12- IR yellow 1x10   02/12- ER w/yellow 1x10   02/12- Scap retr red 2x10   02/12- B Ext red 2x10     TB  tricep          Lat pull downs          B horiz abd          Diagonals      02/12- B horiz abd yellow 1x10     DB prone rows                   Ext                   horiz abd        DB supine punch ups                       Triceps                       FF  02/12-punch ups 2#2x10      DB side lying IR                          ER      sleeper S           RA ROM BUE      RA str BUE      PSS  (IE 67/100)     MODALITIES TOTAL TIME FOR SESSION 08-22 Minutes     MH B shoulder  02/12- MH B shoulders x 8 min     ice B shoulder  02/12- ice B shoulders x 5 min                    THER ACT TOTAL TIME FOR SESSION  Not Performed

## 2021-02-17 ENCOUNTER — HOSPITAL ENCOUNTER (OUTPATIENT)
Dept: OCCUPATIONAL THERAPY | Age: 71
Setting detail: THERAPIES SERIES
Discharge: HOME | End: 2021-02-17
Attending: NURSE PRACTITIONER
Payer: MEDICARE

## 2021-02-17 DIAGNOSIS — M75.51 BILATERAL SHOULDER BURSITIS: Primary | ICD-10-CM

## 2021-02-17 DIAGNOSIS — M75.52 BILATERAL SHOULDER BURSITIS: Primary | ICD-10-CM

## 2021-02-17 PROCEDURE — 97010 HOT OR COLD PACKS THERAPY: CPT | Mod: GO

## 2021-02-17 PROCEDURE — 97110 THERAPEUTIC EXERCISES: CPT | Mod: GO

## 2021-02-17 NOTE — PROGRESS NOTES
OT DAILY NOTE FOR OUTPATIENT THERAPY    Patient: Muriel Franklin   MRN: [id number]  : 1950 71 y.o.  Referring Physician: Isadora Rebolledo CR*  Date of Visit: 2021      Certification Dates: 21 through 21    Diagnosis:   1. Bilateral shoulder bursitis        Chief Complaints:   Dec ROM, Dec Strength, Pain, Self Care Difficulties, Decreased recreational/play activity    Precautions:  Existing Precautions/Restrictions: no known precautions/restrictions    TODAY'S VISIT    History/Vitals/Pain/Encounter Info - 21 1130        Injury History/Precautions/Daily Required Info    Document Type  daily treatment     Primary Therapist  Pinky Arnett, OTR/L     Chief Complaint/Reason for Visit   Dec ROM, Dec Strength, Pain, Self Care Difficulties, Decreased recreational/play activity     Referring Physician  Dr Potter     Existing Precautions/Restrictions  no known precautions/restrictions     Pertinent History of Current Functional Problem  Pt reports pain L upper arm (bicep/tricep/mid delt area) on & off for ~1 year then ~9 months ago, intermittent pain started in R shoulder/upper arm which has become constant w/in the past 6 months or so, pt notes R shoulder pain>L shoulder but L upper arm pain>R upper arm pain, pt was going to gym 3x week & due to COVID has been doing work out on [employer] w/, she was using 10#DB over head & has decreased weight to 4# or no weight but is still having pain, also notes she started Yoga a few weeks ago, she had 6 month f-u appt w/PCP on 21, discussed shoulder issues, XRays were ordered which reveaed (-)fx or dislocation but moderate DJD B shoulders & pt was referred to OT, pt notes resting pain  2/10, w/use 3/10, ^to 6-7/10 at worst, asael when lifting overhead pending weight & w/sleeping     Start Time  1106     Stop Time  1215     Time Calculation (min)  69 min     Patient reported fall since last visit  No        Pain Assessment    Currently  in pain  Yes     Preferred Pain Scale  number (Numeric Rating Pain Scale)     Pain: Body location  Shoulder     Pain Rating (0-10): Pre Activity  2    2/10 top R shoulder & B upper arms    Pain Rating (0-10): Post Activity  --    2/10 top R shoulder, 1/10 B upper arms       Pain Interventions    Intervention   MH B shoulders for warm up, TE, ice     Post Intervention Comments  therapy reduced pain B upper arms, no increase pain in R shoulder         Daily Treatment Assessment and Plan - 02/17/21 1228        Daily Treatment Assessment and Plan    Progress toward goals  Progressing     Daily Outcome Summary  Nice progression of program, Pt was able to ^resistance on UBE by .5 to L2, added ball on wall exercises w/no ^pain, added 2#MB exercises, pt reported ^in pain when attempting counter clockwise circles w/RUE, instructed her not to perform, no ^in pain w/any other MB exercises but was more challenging w/RUE vs LUE, added DB exercises per sheet using 1# RUE & 2#LUE, (2# DB painful in R shoulder when attempted, no pain w/1#), pt also ^reps w/TB exercises by 10 for 2 sets of 10 w/IR & ER, tactile cueing to avoid trunk & shoulder substitution, vc's also needed for proper technique w/various shoulder stretches, RUE easily fatigues     Plan and Recommendations  con't w/POC         Today's Treatment:      SHOULDER OT FLOW SHEET    EXERCISES CURRENT SESSION TIME   SELF-CARE TOTAL TIME FOR SESSION Not Performed    education/proper positioning  02/28-discussed proper sleeping/sitting positions w/use of pillows & gave HO                   THER EX TOTAL TIME FOR SESSION 38-52 MInutes    PROM RUE  02/17-PROM L shoulder     UBE  02/17- ^L 2 (^.5) 3 min F, 3 min B     pulleys      dowel stretches BUE                                    FF                          Abd                           Ext                          ER    02/17- FF 3x15 sec H   02/17- Abd 3x 5 sec H   02/17- Ext 3x15 sec H     pendulum      IR Stretch  02/17-  IR strap S 3x15 sec H     Post cap S  02/17- post cap S B UE 3x15 sec H      Inferior cap S  02/17- inferior cap S BUE 3x15 sec H    Bicep/chest S      wrist S    flexor w/forearm sup & pron   extensor            Wall climb/finger ladder      Lat flex      Lev scap           DB exercises BUE   FF   scaption   Abd   Bicep curls   Forearm    press up    02/17-added FF R 1#, L 2#1x10   02/17-added scaption R1#1x10, L 2#1x10   02/17-added Abd R 1#1x10, L 2# 1x10       02/12- press up w/1# 2x10    Ball on wall ex  Punches  Circles, reverse  Alphabet writing  02/17-added ball on wall ex today BUE   02/17-added punches BUE 10xs   02/17-added circles & reverse BUE 10xs      Wall push ups   table/plinth push ups   mat push ups      planks      MB exercises  02/17-added 2# MB ex today    trampoline throw 20xs   single arm circles R clockwise only 10xs, reported ^pain when  attempting counterclockwise, instructed her to stop   LUE circles B directions 10xs   press up & out BUE 10xs   Ball pass behind back R>L, L>R 10xs each way              TB BUE           flex          Abd          scaption          IR          ER          scap retraction          B Ext     02/12- FF red 2x10   02/12- Abd yellow 2x10   02/12- scaption yellow 2x10   02/17- IR yellow ^2x10   02/17- ER w/yellow ^2x10   02/17- Scap retr red 2x10   02/17- B Ext red 2x10     TB  tricep          Lat pull downs          B horiz abd          Diagonals      02/12- B horiz abd yellow 1x10     DB prone rows                   Ext                   horiz abd        DB supine punch ups                       Triceps                       FF  02/12-punch ups 2#2x10      DB side lying IR                          ER      sleeper S           RA ROM BUE      RA str BUE      PSS  (IE 67/100)     MODALITIES TOTAL TIME FOR SESSION 08-22 Minutes     MH B shoulder  02/17- MH B shoulders x 8 min     ice B shoulder  02/17- ice B shoulders x 5 min                    THER  ACT TOTAL TIME FOR SESSION  Not Performed

## 2021-02-17 NOTE — OP OT TREATMENT LOG
SHOULDER OT FLOW SHEET    EXERCISES CURRENT SESSION TIME   SELF-CARE TOTAL TIME FOR SESSION Not Performed    education/proper positioning  02/28-discussed proper sleeping/sitting positions w/use of pillows & gave HO                   THER EX TOTAL TIME FOR SESSION 38-52 MInutes    PROM RUE  02/17-PROM L shoulder     UBE  02/17- ^L 2 (^.5) 3 min F, 3 min B     pulleys      dowel stretches BUE                                    FF                          Abd                           Ext                          ER    02/17- FF 3x15 sec H   02/17- Abd 3x 5 sec H   02/17- Ext 3x15 sec H     pendulum      IR Stretch  02/17- IR strap S 3x15 sec H     Post cap S  02/17- post cap S B UE 3x15 sec H      Inferior cap S  02/17- inferior cap S BUE 3x15 sec H    Bicep/chest S      wrist S    flexor w/forearm sup & pron   extensor            Wall climb/finger ladder      Lat flex      Lev scap           DB exercises BUE   FF   scaption   Abd   Bicep curls   Forearm    press up    02/17-added FF R 1#, L 2#1x10   02/17-added scaption R1#1x10, L 2#1x10   02/17-added Abd R 1#1x10, L 2# 1x10       02/12- press up w/1# 2x10    Ball on wall ex  Punches  Circles, reverse  Alphabet writing  02/17-added ball on wall ex today BUE   02/17-added punches BUE 10xs   02/17-added circles & reverse BUE 10xs      Wall push ups   table/plinth push ups   mat push ups      planks      MB exercises  02/17-added 2# MB ex today    trampoline throw 20xs   single arm circles R clockwise only 10xs, reported ^pain when  attempting counterclockwise, instructed her to stop   LUE circles B directions 10xs   press up & out BUE 10xs   Ball pass behind back R>L, L>R 10xs each way              TB BUE           flex          Abd          scaption          IR          ER          scap retraction          B Ext     02/12- FF red 2x10   02/12- Abd yellow 2x10   02/12- scaption yellow 2x10   02/17- IR yellow ^2x10   02/17- ER w/yellow ^2x10   02/17-  Scap retr red 2x10   02/17- B Ext red 2x10     TB  tricep          Lat pull downs          B horiz abd          Diagonals      02/12- B horiz abd yellow 1x10     DB prone rows                   Ext                   horiz abd        DB supine punch ups                       Triceps                       FF  02/12-punch ups 2#2x10      DB side lying IR                          ER      sleeper S           RA ROM BUE      RA str BUE      PSS  (IE 67/100)     MODALITIES TOTAL TIME FOR SESSION 08-22 Minutes     MH B shoulder  02/17- MH B shoulders x 8 min     ice B shoulder  02/17- ice B shoulders x 5 min                    THER ACT TOTAL TIME FOR SESSION  Not Performed

## 2021-02-23 ENCOUNTER — HOSPITAL ENCOUNTER (OUTPATIENT)
Dept: OCCUPATIONAL THERAPY | Age: 71
Setting detail: THERAPIES SERIES
Discharge: HOME | End: 2021-02-23
Attending: NURSE PRACTITIONER
Payer: MEDICARE

## 2021-02-23 DIAGNOSIS — M75.52 BILATERAL SHOULDER BURSITIS: Primary | ICD-10-CM

## 2021-02-23 DIAGNOSIS — M75.51 BILATERAL SHOULDER BURSITIS: Primary | ICD-10-CM

## 2021-02-23 PROCEDURE — 97110 THERAPEUTIC EXERCISES: CPT | Mod: GO

## 2021-02-23 PROCEDURE — 97010 HOT OR COLD PACKS THERAPY: CPT | Mod: GO

## 2021-02-23 NOTE — OP OT TREATMENT LOG
SHOULDER OT FLOW SHEET    EXERCISES CURRENT SESSION TIME   SELF-CARE TOTAL TIME FOR SESSION Not Performed    education/proper positioning  02/28-discussed proper sleeping/sitting positions w/use of pillows & gave HO                   THER EX TOTAL TIME FOR SESSION 38-52 MInutes    PROM RUE  02/23-PROM L shoulder     ER/scap retraction S      UBE  02/23- L 2 3 min F, 3 min B     pulleys      dowel stretches BUE                                    FF                          Abd                           Ext                          ER    02/23- FF 3x15 sec H   02/23- Abd 3x 5 sec H   02/23- Ext 3x15 sec H     pendulum      IR Stretch  02/23- IR strap S 3x15 sec H     Post cap S  02/23- post cap S B UE 3x15 sec H      Inferior cap S  02/23- inferior cap S BUE 3x15 sec H    Bicep/chest S      wrist S    flexor w/forearm sup & pron   extensor            Wall climb/finger ladder      Lat flex      Lev scap           DB exercises BUE   FF   scaption   Abd   Bicep curls   Forearm    press up    02/23- FF R ^2# &^2x10, L 2#^2x10   02/23-scaption R ^2# &^2x10, L 2#^2x10   02/23-Abd R ^2# &^2x10, L 2#^2x10       02/23- press up w/1# 2x10    Ball on wall ex  Punches  Circles, reverse  Alphabet writing  02/17-added ball on wall ex today BUE   02/17-added punches BUE 10xs   02/17-added circles & reverse BUE 10xs      Wall push ups   table/plinth push ups   mat push ups      planks      MB exercises  02/23-2# MB ex     trampoline throw 20xs   single arm circles R clockwise 10xs & counterclockwise (unable to perform this direction last session 2* too painful & was instructed to stop)   LUE circles B directions 10xs   press up & out BUE 10xs   Ball pass behind back R>L, L>R 10xs each way              TB BUE           flex          Abd          scaption          IR          ER          scap retraction          B Ext     02/12- FF red 2x10   02/12- Abd yellow 2x10   02/12- scaption yellow 2x10   02/23- IR yellow 2x10    02/23- ER w/yellow 2x10   02/23- Scap retr red 2x10   02/23- B Ext red 2x10     TB  tricep          Lat pull downs          B horiz abd          Diagonals      02/23- B horiz abd yellow ^2x10     DB prone rows                   Ext                   horiz abd        DB supine punch ups                       Triceps                       FF  02/12-punch ups 2#2x10      DB side lying IR                          ER      sleeper S           RA ROM BUE      RA str BUE      PSS  (IE 67/100)     MODALITIES TOTAL TIME FOR SESSION 08-22 Minutes     MH B shoulder  02/23- MH B shoulders x 8 min     ice B shoulder  02/23- ice B shoulders x 5 min                    THER ACT TOTAL TIME FOR SESSION  Not Performed

## 2021-02-23 NOTE — PROGRESS NOTES
OT DAILY NOTE FOR OUTPATIENT THERAPY    Patient: Muriel Franklin   MRN: [id number]  : 1950 71 y.o.  Referring Physician: Isadora Rebolledo CR*  Date of Visit: 2021      Certification Dates: 21 through 21    Diagnosis:   1. Bilateral shoulder bursitis        Chief Complaints:   Dec ROM, Dec Strength, Pain, Self Care Difficulties, Decreased recreational/play activity    Precautions:  Existing Precautions/Restrictions: no known precautions/restrictions    TODAY'S VISIT    History/Vitals/Pain/Encounter Info - 21 1436        Injury History/Precautions/Daily Required Info    Document Type  daily treatment     Primary Therapist  Pinky Arnett, OTR/L     Chief Complaint/Reason for Visit   Dec ROM, Dec Strength, Pain, Self Care Difficulties, Decreased recreational/play activity     Referring Physician  Dr Potter     Existing Precautions/Restrictions  no known precautions/restrictions     Pertinent History of Current Functional Problem  Pt reports pain L upper arm (bicep/tricep/mid delt area) on & off for ~1 year then ~9 months ago, intermittent pain started in R shoulder/upper arm which has become constant w/in the past 6 months or so, pt notes R shoulder pain>L shoulder but L upper arm pain>R upper arm pain, pt was going to gym 3x week & due to COVID has been doing work out on [employer] w/, she was using 10#DB over head & has decreased weight to 4# or no weight but is still having pain, also notes she started Yoga a few weeks ago, she had 6 month f-u appt w/PCP on 21, discussed shoulder issues, XRays were ordered which reveaed (-)fx or dislocation but moderate DJD B shoulders & pt was referred to OT, pt notes resting pain  2/10, w/use 3/10, ^to 6-7/10 at worst, asael when lifting overhead pending weight & w/sleeping     Patient/Family/Caregiver Comments/Observations  pt reported L upper arm was sore thursday () by late afternoon/early evening (had therapy wednesday)  & she used heating pad as previously discussed which helped relieve pain     Start Time  0206     Stop Time  0307     Time Calculation (min)  61 min     Patient reported fall since last visit  No        Pain Assessment    Currently in pain  Yes     Preferred Pain Scale  number (Numeric Rating Pain Scale)     Pain Rating (0-10): Pre Activity  --    2 1/2/10 top of R sh & B upper arms    Pain Rating (0-10): Post Activity  --    2 1/2/10 top of R sh & B upper arms       Pain Interventions    Intervention   MH B shoulders, TE, ice     Post Intervention Comments  pain same donning coat         Daily Treatment Assessment and Plan - 02/23/21 1527        Daily Treatment Assessment and Plan    Progress toward goals  Progressing     Daily Outcome Summary  pt was able to ^DB weight RUE by 1# to 2# for FF, Abd & scaption as well as ^reps to 2 sets of 10 w/BUE & w/B horiz abd/add w/yellow TB which she tolerated well, vc's needed during DB ex & tactile cueing w/IR/ER TB exercises for proper technique & to avoid trunk & shoulder compensation, pt was able to perform counterclockwise circles w/2#MB w/RUE today w/o ^in pain, (this direction was too painful when attempting last session & pt was instructed not to perform), nneka session well     Plan and Recommendations  con't w/POC         Today's Treatment:      SHOULDER OT FLOW SHEET    EXERCISES CURRENT SESSION TIME   SELF-CARE TOTAL TIME FOR SESSION Not Performed    education/proper positioning  02/28-discussed proper sleeping/sitting positions w/use of pillows & gave HO                   THER EX TOTAL TIME FOR SESSION 38-52 MInutes    PROM RUE  02/23-PROM L shoulder     ER/scap retraction S      UBE  02/23- L 2 3 min F, 3 min B     pulleys      dowel stretches BUE                                    FF                          Abd                           Ext                          ER    02/23- FF 3x15 sec H   02/23- Abd 3x 5 sec H   02/23- Ext 3x15 sec H     pendulum      IR  Stretch  02/23- IR strap S 3x15 sec H     Post cap S  02/23- post cap S B UE 3x15 sec H      Inferior cap S  02/23- inferior cap S BUE 3x15 sec H    Bicep/chest S      wrist S    flexor w/forearm sup & pron   extensor            Wall climb/finger ladder      Lat flex      Lev scap           DB exercises BUE   FF   scaption   Abd   Bicep curls   Forearm    press up    02/23- FF R ^2# &^2x10, L 2#^2x10   02/23-scaption R ^2# &^2x10, L 2#^2x10   02/23-Abd R ^2# &^2x10, L 2#^2x10       02/23- press up w/1# 2x10    Ball on wall ex  Punches  Circles, reverse  Alphabet writing  02/17-added ball on wall ex today BUE   02/17-added punches BUE 10xs   02/17-added circles & reverse BUE 10xs      Wall push ups   table/plinth push ups   mat push ups      planks      MB exercises  02/23-2# MB ex     trampoline throw 20xs   single arm circles R clockwise 10xs & counterclockwise (unable to perform this direction last session 2* too painful & was instructed to stop)   LUE circles B directions 10xs   press up & out BUE 10xs   Ball pass behind back R>L, L>R 10xs each way              TB BUE           flex          Abd          scaption          IR          ER          scap retraction          B Ext     02/12- FF red 2x10   02/12- Abd yellow 2x10   02/12- scaption yellow 2x10   02/23- IR yellow 2x10   02/23- ER w/yellow 2x10   02/23- Scap retr red 2x10   02/23- B Ext red 2x10     TB  tricep          Lat pull downs          B horiz abd          Diagonals      02/23- B horiz abd yellow ^2x10     DB prone rows                   Ext                   horiz abd        DB supine punch ups                       Triceps                       FF  02/12-punch ups 2#2x10      DB side lying IR                          ER      sleeper S           RA ROM BUE      RA str BUE      PSS  (IE 67/100)     MODALITIES TOTAL TIME FOR SESSION 08-22 Minutes     MH B shoulder  02/23- MH B shoulders x 8 min     ice B shoulder  02/23- ice B  shoulders x 5 min                    THER ACT TOTAL TIME FOR SESSION  Not Performed

## 2021-02-25 ENCOUNTER — HOSPITAL ENCOUNTER (OUTPATIENT)
Dept: OCCUPATIONAL THERAPY | Age: 71
Setting detail: THERAPIES SERIES
Discharge: HOME | End: 2021-02-25
Attending: NURSE PRACTITIONER
Payer: MEDICARE

## 2021-02-25 DIAGNOSIS — M75.51 BILATERAL SHOULDER BURSITIS: Primary | ICD-10-CM

## 2021-02-25 DIAGNOSIS — M75.52 BILATERAL SHOULDER BURSITIS: Primary | ICD-10-CM

## 2021-02-25 PROCEDURE — 97010 HOT OR COLD PACKS THERAPY: CPT | Mod: GO

## 2021-02-25 PROCEDURE — 97110 THERAPEUTIC EXERCISES: CPT | Mod: GO

## 2021-02-25 NOTE — PROGRESS NOTES
"OT DAILY NOTE FOR OUTPATIENT THERAPY    Patient: Muriel Franklin   MRN: [id number]  : 1950 71 y.o.  Referring Physician: Isadora Rebolledo CR*  Date of Visit: 2021      Certification Dates: 21 through 21    Diagnosis:   1. Bilateral shoulder bursitis        Chief Complaints:   Dec ROM, Dec Strength, Pain, Self Care Difficulties, Decreased recreational/play activity    Precautions:  Existing Precautions/Restrictions: no known precautions/restrictions    TODAY'S VISIT    History/Vitals/Pain/Encounter Info - 21 1521        Injury History/Precautions/Daily Required Info    Document Type  daily treatment     Primary Therapist  Pinky Arnett, OTR/L     Chief Complaint/Reason for Visit   Dec ROM, Dec Strength, Pain, Self Care Difficulties, Decreased recreational/play activity     Referring Physician  Dr Potter     Existing Precautions/Restrictions  no known precautions/restrictions     Pertinent History of Current Functional Problem  Pt reports pain L upper arm (bicep/tricep/mid delt area) on & off for ~1 year then ~9 months ago, intermittent pain started in R shoulder/upper arm which has become constant w/in the past 6 months or so, pt notes R shoulder pain>L shoulder but L upper arm pain>R upper arm pain, pt was going to gym 3x week & due to COVID has been doing work out on [employer] w/, she was using 10#DB over head & has decreased weight to 4# or no weight but is still having pain, also notes she started Yoga a few weeks ago, she had 6 month f-u appt w/PCP on 21, discussed shoulder issues, XRays were ordered which reveaed (-)fx or dislocation but moderate DJD B shoulders & pt was referred to OT, pt notes resting pain  2/10, w/use 3/10, ^to 6-7/10 at worst, asael when lifting overhead pending weight & w/sleeping     Patient/Family/Caregiver Comments/Observations  \"I don't have any pain right now\"     Start Time  1410     Stop Time  1512     Time Calculation (min)  62 " min     Patient reported fall since last visit  No        Pain Assessment    Currently in pain  No/Denies     Pain Rating (0-10): Post Activity  --    1 1/2/10 RUE, no pain LUE       Pain Interventions    Intervention    for warm up, TE, ice     Post Intervention Comments  no pain LUE, mild pain RUE         Daily Treatment Assessment and Plan - 02/25/21 1511        Daily Treatment Assessment and Plan    Progress toward goals  Progressing     Daily Outcome Summary  Pt was able to progress resistance on UBE by .5 to L2.5 & ^DB weight by 1# to 2# for  press up for 2 sets of 10, & added PNF diagonals in both directions using 2#MB w/BUE, pt reported mild pain R shoulder 1 1/2-2/10 towards end of session from cumulative work out, no pain LUE     Plan and Recommendations  con't w/POC         Today's Treatment:      SHOULDER OT FLOW SHEET    EXERCISES CURRENT SESSION TIME   SELF-CARE TOTAL TIME FOR SESSION Not Performed    education/proper positioning  02/28-discussed proper sleeping/sitting positions w/use of pillows & gave HO                   THER EX TOTAL TIME FOR SESSION 38-52 MInutes    PROM RUE  02/25-PROM R shoulder     ER/scap retraction S  02/25-added ER/scap retraction S today 3x15 sec H     UBE  02/25- ^L 2.5,  3 min F, 3 min B     pulleys      dowel stretches BUE                                    FF                          Abd                           Ext                          ER    02/25- FF 3x15 sec H   02/25- Abd 3x 5 sec H   02/25- Ext 3x15 sec H     pendulum      IR Stretch  02/25- IR strap S 3x15 sec H     Post cap S  02/25- post cap S B UE 3x15 sec H      Inferior cap S  02/25- inferior cap S BUE 3x15 sec H    Bicep/chest S      wrist S    flexor w/forearm sup & pron   extensor            Wall climb/finger ladder      Lat flex      Lev scap           DB exercises BUE   FF   scaption   Abd   Bicep curls   Forearm    press up    02/25- FF R 2#2x10, L 2#2x10   02/25-scaption R 2# &  2x10, L 2#2x10   02/25-Abd R  2# & 2x10, L 2#2x10       02/25- press up ^2# 2x10    Ball on wall ex  Punches  Circles, reverse  Alphabet writing  02/17-added ball on wall ex today BUE   02/17-added punches BUE 10xs   02/17-added circles & reverse BUE 10xs      Wall push ups   table/plinth push ups   mat push ups      planks      MB exercises  02/25-2# MB ex     trampoline throw 20xs   RUE single arm circles R B directions 10xs   LUE circles B directions 10xs   press up & out BUE 10xs   Ball pass behind back R>L, L>R 10xs each way   added PNF diagonals BUE & B directions today 10xs each way              TB BUE           flex          Abd          scaption          IR          ER          scap retraction          B Ext     02/12- FF red 2x10   02/12- Abd yellow 2x10   02/12- scaption yellow 2x10   02/25- IR yellow 2x10   02/25- ER w/yellow 2x10   02/25- Scap retr red 2x10   02/25- B Ext red 2x10     TB  tricep          Lat pull downs          B horiz abd          Diagonals      02/25- B horiz abd yellow 2x10     DB prone rows                   Ext                   horiz abd        DB supine punch ups                       Triceps                       FF  02/12-punch ups 2#2x10      DB side lying IR                          ER      sleeper S           RA ROM BUE      RA str BUE      PSS  (IE 67/100)     MODALITIES TOTAL TIME FOR SESSION 08-22 Minutes     MH B shoulder  02/25- MH B shoulders x 8 min     ice B shoulder  02/25- ice B shoulders x 5 min                    THER ACT TOTAL TIME FOR SESSION  Not Performed

## 2021-02-25 NOTE — OP OT TREATMENT LOG
SHOULDER OT FLOW SHEET    EXERCISES CURRENT SESSION TIME   SELF-CARE TOTAL TIME FOR SESSION Not Performed    education/proper positioning  02/28-discussed proper sleeping/sitting positions w/use of pillows & gave HO                   THER EX TOTAL TIME FOR SESSION 38-52 MInutes    PROM RUE  02/25-PROM R shoulder     ER/scap retraction S  02/25-added ER/scap retraction S today 3x15 sec H     UBE  02/25- ^L 2.5,  3 min F, 3 min B     pulleys      dowel stretches BUE                                    FF                          Abd                           Ext                          ER    02/25- FF 3x15 sec H   02/25- Abd 3x 5 sec H   02/25- Ext 3x15 sec H     pendulum      IR Stretch  02/25- IR strap S 3x15 sec H     Post cap S  02/25- post cap S B UE 3x15 sec H      Inferior cap S  02/25- inferior cap S BUE 3x15 sec H    Bicep/chest S      wrist S    flexor w/forearm sup & pron   extensor            Wall climb/finger ladder      Lat flex      Lev scap           DB exercises BUE   FF   scaption   Abd   Bicep curls   Forearm    press up    02/25- FF R 2#2x10, L 2#2x10   02/25-scaption R 2# & 2x10, L 2#2x10   02/25-Abd R  2# & 2x10, L 2#2x10       02/25- press up ^2# 2x10    Ball on wall ex  Punches  Circles, reverse  Alphabet writing  02/17-added ball on wall ex today BUE   02/17-added punches BUE 10xs   02/17-added circles & reverse BUE 10xs      Wall push ups   table/plinth push ups   mat push ups      planks      MB exercises  02/25-2# MB ex     trampoline throw 20xs   RUE single arm circles R B directions 10xs   LUE circles B directions 10xs   press up & out BUE 10xs   Ball pass behind back R>L, L>R 10xs each way   added PNF diagonals BUE & B directions today 10xs each way              TB BUE           flex          Abd          scaption          IR          ER          scap retraction          B Ext     02/12- FF red 2x10   02/12- Abd yellow 2x10   02/12- scaption yellow 2x10   02/25- IR yellow  2x10   02/25- ER w/yellow 2x10   02/25- Scap retr red 2x10   02/25- B Ext red 2x10     TB  tricep          Lat pull downs          B horiz abd          Diagonals      02/25- B horiz abd yellow 2x10     DB prone rows                   Ext                   horiz abd        DB supine punch ups                       Triceps                       FF  02/12-punch ups 2#2x10      DB side lying IR                          ER      sleeper S           RA ROM BUE      RA str BUE      PSS  (IE 67/100)     MODALITIES TOTAL TIME FOR SESSION 08-22 Minutes     MH B shoulder  02/25- MH B shoulders x 8 min     ice B shoulder  02/25- ice B shoulders x 5 min                    THER ACT TOTAL TIME FOR SESSION  Not Performed

## 2021-03-04 ENCOUNTER — HOSPITAL ENCOUNTER (OUTPATIENT)
Dept: OCCUPATIONAL THERAPY | Age: 71
Setting detail: THERAPIES SERIES
Discharge: HOME | End: 2021-03-04
Attending: NURSE PRACTITIONER
Payer: MEDICARE

## 2021-03-04 DIAGNOSIS — M75.51 BILATERAL SHOULDER BURSITIS: Primary | ICD-10-CM

## 2021-03-04 DIAGNOSIS — M75.52 BILATERAL SHOULDER BURSITIS: Primary | ICD-10-CM

## 2021-03-04 PROCEDURE — 97010 HOT OR COLD PACKS THERAPY: CPT | Mod: GO

## 2021-03-04 PROCEDURE — 97110 THERAPEUTIC EXERCISES: CPT | Mod: GO

## 2021-03-04 NOTE — OP OT TREATMENT LOG
SHOULDER OT FLOW SHEET    EXERCISES CURRENT SESSION TIME   SELF-CARE TOTAL TIME FOR SESSION Not Performed    education/proper positioning  02/28-discussed proper sleeping/sitting positions w/use of pillows & gave HO                   THER EX TOTAL TIME FOR SESSION 38-52 MInutes    PROM RUE  02/25-PROM R shoulder     ER/scap retraction S  02/25-added ER/scap retraction S today 3x15 sec H     UBE  03/04- L 2.5,  3 min F, 3 min B     pulleys      dowel stretches BUE                                    FF                          Abd                           Ext                          ER    03/04- FF 3x15 sec H   03/04- Abd 3x 5 sec H   03/04- Ext 3x15 sec H     pendulum      IR Stretch  03/04- IR strap S 3x15 sec H     Post cap S  03/04- post cap S B UE 3x15 sec H      Inferior cap S  03/04- inferior cap S BUE 3x15 sec H    Bicep/chest S      wrist S    flexor w/forearm sup & pron   extensor            Wall climb/finger ladder      Lat flex      Lev scap           DB exercises BUE   FF   scaption   Abd   Bicep curls   Forearm    press up    03/04- FF R 2#2x10, L 2#2x10   03/04-scaption R 2# 2x10, L 2#2x10   03/04-Abd R  2# 2x10, L 2#2x10       03/04- press up 2# 2x10    Ball on wall ex  Punches  Circles, reverse  Alphabet writing  02/17-added ball on wall ex today BUE   02/17-added punches BUE 10xs   02/17-added circles & reverse BUE 10xs      Wall push ups   table/plinth push ups   mat push ups      planks      MB exercises  02/25-2# MB ex     trampoline throw 20xs   RUE single arm circles R B directions 10xs   LUE circles B directions 10xs   press up & out BUE 10xs   Ball pass behind back R>L, L>R 10xs each way   added PNF diagonals BUE & B directions today 10xs each way              TB BUE           flex          Abd          scaption          IR          ER          scap retraction          B Ext     02/12- FF red 2x10   02/12- Abd yellow 2x10   02/12- scaption yellow 2x10   03/04- IR L ^red 2x10,  R ^red 1x10, yellow 1x10   03/04- ER BUE ^red 2x10   03/04- Scap retr red 2x10   03/04- B Ext red 2x10     TB  tricep          Lat pull downs          B horiz abd          Diagonals      02/25- B horiz abd yellow 2x10     DB prone rows                   Ext                   horiz abd        DB supine punch ups                       Triceps                       FF  02/12-punch ups 2#2x10      DB side lying IR                          ER      sleeper S           RA ROM BUE  03/04-RA AROM BUE-see flow sheet     RA str BUE      PSS  (IE 67/100)  03/04- PSS ^to 77/100    MODALITIES TOTAL TIME FOR SESSION 08-22 Minutes     MH B shoulder  03/04- MH B shoulders x 8 min     ice B shoulder  03/04- ice B shoulders x 5 min                    THER ACT TOTAL TIME FOR SESSION  Not Performed

## 2021-03-06 NOTE — PROGRESS NOTES
OT PROGRESS NOTE FOR OUTPATIENT THERAPY    Patient: Muriel Franklin   MRN: [id number]  : 1950 71 y.o.  Referring Physician: Isadora Rebolledo CR*  Date of Visit: 3/4/2021      Certification Dates:   21 through 21    Recommended Frequency & Duration:  2 times/week for up to 3 months     Diagnosis:   1. Bilateral shoulder bursitis        Chief Complaints:  Chief Complaint   Patient presents with   • Dec ROM   • Dec Strength   • Pain   • Self Care Difficulties   • Decreased recreational/play activity       Precautions:  Existing Precautions/Restrictions: no known precautions/restrictions    TODAY'S VISIT:    General Information - 21 1521        General Information    Document Type  progress note     Referring Physician  Dr Potter     Pertinent History of Current Functional Problem  Pt reports pain L upper arm (bicep/tricep/mid delt area) on & off for ~1 year then ~9 months ago, intermittent pain started in R shoulder/upper arm which has become constant w/in the past 6 months or so, pt notes R shoulder pain>L shoulder but L upper arm pain>R upper arm pain, pt was going to gym 3x week & due to COVID has been doing work out on [employer] w/, she was using 10#DB over head & has decreased weight to 4# or no weight but is still having pain, also notes she started Yoga a few weeks ago, she had 6 month f-u appt w/PCP on 21, discussed shoulder issues, XRays were ordered which reveaed (-)fx or dislocation but moderate DJD B shoulders & pt was referred to OT, pt notes resting pain  2/10, w/use 3/10, ^to 6-7/10 at worst, asael when lifting overhead pending weight & w/sleeping     Existing Precautions/Restrictions  no known precautions/restrictions        Time Calculation    Start Time  1505     Stop Time  1610     Time Calculation (min)  65 min         Pain/Vitals - 21 1521        Pain Assessment    Currently in pain  No/Denies        Pain Interventions    Intervention   PRINCE BRAMBILA  shoulders for warm up, TE, ice     Post Intervention Comments  no pain           Daily Treatment Assessment and Plan - 03/06/21 1336        Daily Treatment Assessment and Plan    Progress toward goals  Progressing     Daily Outcome Summary  Progress note completed today, PSS ^10 points to 77/100, RA AROM BUE w/nice improvements, RUE ^15 deg w/FF & 10 deg w/Abd, IR & ER, LUE ^5 deg w/FF, Abd & ER, no change w/IR, although IR did ^10 deg in RUE, this is most painful motion bilaterally, pt has made functional gains as well & pain is decreasing, 5/10 at worst (improved from 6-7/10 at worst), strength not formally assessed today but overall is improving as evident by her ability to increase weight/resistance, today, she was able to ^resistance w/TB by 1 level, to red w/ER w/BUE for 2 sets of 10 & w/IR w/LUE for 2 sets of 10, w/RUE for 1 set of 10, pt reported min pain when starting 2nd set, instructed her to perform w/yellow, no c/o pain w/the lighter resistance.  Pt is making steady progress towards LTGs, although gains have been made, she still demo pain & weakness in BUE impeding her ability to perform BADLs/IADLs & recreational tasks. She will benefit from continuing OT for stated goals     Plan and Recommendations  con't w/POC, see goals           OBJECTIVE MEASUREMENTS/DATA:    Range of Motion    PROM/AROM - 03/04/21 1500        RIGHT: Upper Extremity AROM Assessment    Shoulder Flexion Deficit  140 degrees    no pain    Shoulder Abduction Deficit  140 degrees    no pain    Shoulder Internal Rotation Deficit  65 degrees    to bra line but pain 3/10    Shoulder External Rotation Deficit  80 degrees     Elbow Flexion/Extension Deficit  --    WNL, pain 1/10 w/full elbow extension       LEFT: Upper Extremity AROM Assessment    Left UE AROM normal  --    painful w/ER/Abd combo    Shoulder Flexion Deficit  165 degrees     Shoulder Abduction Deficit  165 degrees     Shoulder Internal Rotation Deficit  45 degrees    pain  3-4/10, to pant line, most painful    Shoulder External Rotation Deficit  75 degrees         BADL/IADL   BADL/IADL - 03/04/21 1522        BADL Interventions Assessment    Upper Body Dressing  Modified independence     Upper body dressing comments  min pain w/donning coat, & min pain at times donning bra     Lower Body Dressing  Modified independence     Lower body dressing comments  no pain (^from min) pulling up pants     Bathing  Modified independence     Bathing comments  no pain now washing shoulders, min pain at times washing back     Toileting  Independent     Toileting comments  no pain     Grooming comments  no pain now (^from min) holding hair dryer     Eating comments  no issues        IADL/Home Interventions Assessment    Driving and community mobility comments  no pain driving     Home management/maintenance comments  all housework is still painful pending task, asael over head lifting but less so, able to carry light grocery bags     Meal prep / clean up comments  less painful reaching into cabinet for pot, still requires 2 hands, min pain lifting 1/2gal, but less often     Other (comments)  PSS ^10 points to 77/100, pt reports resting pain 1/10 (^from 2/10), w/use still 2-3/10, at worst ^to 5/10 (^from 6-7/10)           Today's Treatment::      SHOULDER OT FLOW SHEET    EXERCISES CURRENT SESSION TIME   SELF-CARE TOTAL TIME FOR SESSION Not Performed    education/proper positioning  02/28-discussed proper sleeping/sitting positions w/use of pillows & gave HO                   THER EX TOTAL TIME FOR SESSION 38-52 MInutes    PROM RUE  02/25-PROM R shoulder     ER/scap retraction S  02/25-added ER/scap retraction S today 3x15 sec H     UBE  03/04- L 2.5,  3 min F, 3 min B     pulleys      dowel stretches BUE                                    FF                          Abd                           Ext                          ER    03/04- FF 3x15 sec H   03/04- Abd 3x 5 sec H   03/04- Ext 3x15 sec H      pendulum      IR Stretch  03/04- IR strap S 3x15 sec H     Post cap S  03/04- post cap S B UE 3x15 sec H      Inferior cap S  03/04- inferior cap S BUE 3x15 sec H    Bicep/chest S      wrist S    flexor w/forearm sup & pron   extensor            Wall climb/finger ladder      Lat flex      Lev scap           DB exercises BUE   FF   scaption   Abd   Bicep curls   Forearm    press up    03/04- FF R 2#2x10, L 2#2x10   03/04-scaption R 2# 2x10, L 2#2x10   03/04-Abd R  2# 2x10, L 2#2x10       03/04- press up 2# 2x10    Ball on wall ex  Punches  Circles, reverse  Alphabet writing  02/17-added ball on wall ex today BUE   02/17-added punches BUE 10xs   02/17-added circles & reverse BUE 10xs      Wall push ups   table/plinth push ups   mat push ups      planks      MB exercises  02/25-2# MB ex     trampoline throw 20xs   RUE single arm circles R B directions 10xs   LUE circles B directions 10xs   press up & out BUE 10xs   Ball pass behind back R>L, L>R 10xs each way   added PNF diagonals BUE & B directions today 10xs each way              TB BUE           flex          Abd          scaption          IR          ER          scap retraction          B Ext     02/12- FF red 2x10   02/12- Abd yellow 2x10   02/12- scaption yellow 2x10   03/04- IR L ^red 2x10, R ^red 1x10, yellow 1x10   03/04- ER BUE ^red 2x10   03/04- Scap retr red 2x10   03/04- B Ext red 2x10     TB  tricep          Lat pull downs          B horiz abd          Diagonals      02/25- B horiz abd yellow 2x10     DB prone rows                   Ext                   horiz abd        DB supine punch ups                       Triceps                       FF  02/12-punch ups 2#2x10      DB side lying IR                          ER      sleeper S           RA ROM BUE  03/04-RA AROM BUE-see flow sheet     RA str BUE      PSS  (IE 67/100)  03/04- PSS ^to 77/100    MODALITIES TOTAL TIME FOR SESSION 08-22 Minutes     MH B shoulder  03/04- MH B shoulders x 8  min     ice B shoulder  03/04- ice B shoulders x 5 min                    THER ACT TOTAL TIME FOR SESSION  Not Performed                                  Goals Addressed                 This Visit's Progress    • OT STGs/LTGs          Short Term Goals Time   Frame Result Comment/Progress   Pt will be Supervision with HEP 2 weeks 03/04-ongoing    Increase P/AROM of RUE by 10 degrees to increase independence and ease with overhead ADLs 6 weeks 03/04-partially met 03/04-AROM-met in all planes  PROM-not assessed this date   Increase strength BUE by ½ grade to increase ease with ADLs, household activities and  recreational tasks 6 weeks     Increase functional use of BUE to WFL to increase independence and ease with ADLs, household activities and recreational tasks 6 weeks 03/04-ongoing    Decrease pain to 2/10 with use and 5/10 at worst 6 weeks 03/04-partially met 03/04-pain at worst met at 5/10, pain w/use varies to 2-3/10   Decrease PSS to 72%  (IE 67/100) 6 weeks 03/04-ongoing 03/04-improved to 77/100     Long Term Goals Time  Frame Result Comment/Progress   Pt will be independent with HEP 12 weeks     Patient will increase P/AROM BUE to WNL to increase independence and ease with overhead ADLs 12 weeks     Increase ability to lift 8lbs with RUE to lift gallon of milk and pour coffee pain free 12 weeks     Increase strength RUE to 5/5 & LUE to 4+/5 to increase independence and ease with ADLs, household activities and  recreational tasks 12 weeks     Increase functional use of BUE to WNL pain free 12 weeks     No disability on PSS  12 weeks       Pt will be able to perform work outs including planks pain free 12 weeks     Decrease pain in BUE to 0/10 12 weeks                                                         Clinical Impression: Pt is making steady progress towards LTGs.  See objective data, daily outcome statement & goals above for further status.  She will benefit form continuing OT to reach stated  goals

## 2021-03-10 ENCOUNTER — HOSPITAL ENCOUNTER (OUTPATIENT)
Dept: OCCUPATIONAL THERAPY | Age: 71
Setting detail: THERAPIES SERIES
Discharge: HOME | End: 2021-03-10
Attending: NURSE PRACTITIONER
Payer: MEDICARE

## 2021-03-10 DIAGNOSIS — M75.52 BILATERAL SHOULDER BURSITIS: Primary | ICD-10-CM

## 2021-03-10 DIAGNOSIS — M75.51 BILATERAL SHOULDER BURSITIS: Primary | ICD-10-CM

## 2021-03-10 PROCEDURE — 97110 THERAPEUTIC EXERCISES: CPT | Mod: GO

## 2021-03-10 PROCEDURE — 97010 HOT OR COLD PACKS THERAPY: CPT | Mod: GO

## 2021-03-10 NOTE — OP OT TREATMENT LOG
SHOULDER OT FLOW SHEET    EXERCISES CURRENT SESSION TIME   SELF-CARE TOTAL TIME FOR SESSION Not Performed    education/proper positioning  02/28-discussed proper sleeping/sitting positions w/use of pillows & gave HO                   THER EX TOTAL TIME FOR SESSION 38-52 MInutes    PROM RUE  02/25-PROM R shoulder     ER/scap retraction S  02/25-added ER/scap retraction S today 3x15 sec H     UBE  03/10- ^L 3 (^.5), 3 min F, 3 min B     pulleys      dowel stretches BUE                                    FF                          Abd                           Ext                          ER    03/10- FF 3x15 sec H   03/10- Abd 3x 5 sec H   03/10- Ext 3x15 sec H     pendulum      IR Stretch  03/10- IR strap S 3x15 sec H     Post cap S  03/10- post cap S B UE 3x15 sec H      Inferior cap S  03/10- inferior cap S BUE 3x15 sec H    Bicep/chest S      wrist S    flexor w/forearm sup & pron   extensor            Wall climb/finger ladder      Lat flex      Lev scap           DB exercises BUE   FF   scaption   Abd   Bicep curls   Forearm    press up    03/10- FF R ^3#2x10, L ^3#2x10   03/10-scaption R ^3# 2x10, L ^3#2x10   03/10-Abd R ^3# 2x10, L ^3#2x10       03/10- press up ^3# 2x10    Ball on wall ex  Punches  Circles, reverse  Alphabet writing  02/17-added ball on wall ex today BUE   02/17-added punches BUE 10xs   02/17-added circles & reverse BUE 10xs      Wall push ups   table/plinth push ups   mat push ups      planks      MB exercises  02/25-2# MB ex     trampoline throw 20xs   RUE single arm circles R B directions 10xs   LUE circles B directions 10xs   press up & out BUE 10xs   Ball pass behind back R>L, L>R 10xs each way   added PNF diagonals BUE & B directions today 10xs each way              TB BUE           flex          Abd          scaption          IR          ER          scap retraction          B Ext     02/12- FF red 2x10   02/12- Abd yellow 2x10   02/12- scaption yellow 2x10   03/10- IR  BUE red 2x10   03/10- ER BUE red 2x10   03/10- Scap retr ^green 2x10   03/10- B Ext red 2x10     TB  tricep          Lat pull downs          B horiz abd          Diagonals  03/10- added tricep w/green today 1x10   03/10- added lat pull downs w/green today 1x10   03/10- B horiz abd ^red 1x10   03/10- added PNF Diagonals today w/red 1x10 B directions BUE           DB prone rows                   Ext                   horiz abd        DB supine punch ups                       Triceps                       FF  02/12-punch ups 2#2x10      DB side lying IR                          ER      sleeper S           RA ROM BUE  03/04-RA AROM BUE-see flow sheet     RA str BUE      PSS  (IE 67/100)  03/04- PSS ^to 77/100    MODALITIES TOTAL TIME FOR SESSION 08-22 Minutes     MH B shoulder  03/10- MH B shoulders x 8 min     ice B shoulder  03/10- ice B shoulders x 5 min                    THER ACT TOTAL TIME FOR SESSION  Not Performed

## 2021-03-10 NOTE — PROGRESS NOTES
OT DAILY NOTE FOR OUTPATIENT THERAPY    Patient: Muriel Franklin   MRN: [id number]  : 1950 71 y.o.  Referring Physician: Isadora Rebolledo CR*  Date of Visit: 3/10/2021      Certification Dates: 21 through 21    Diagnosis:   1. Bilateral shoulder bursitis        Chief Complaints:   Dec ROM, Dec Strength, Pain, Self Care Difficulties, Decreased recreational/play activity    Precautions:  Existing Precautions/Restrictions: no known precautions/restrictions    TODAY'S VISIT    History/Vitals/Pain/Encounter Info - 03/10/21 1507        Injury History/Precautions/Daily Required Info    Document Type  daily treatment     Primary Therapist  Pinky Arnett, OTR/L     Chief Complaint/Reason for Visit   Dec ROM, Dec Strength, Pain, Self Care Difficulties, Decreased recreational/play activity     Referring Physician  Dr Potter     Existing Precautions/Restrictions  no known precautions/restrictions     Pertinent History of Current Functional Problem  Pt reports pain L upper arm (bicep/tricep/mid delt area) on & off for ~1 year then ~9 months ago, intermittent pain started in R shoulder/upper arm which has become constant w/in the past 6 months or so, pt notes R shoulder pain>L shoulder but L upper arm pain>R upper arm pain, pt was going to gym 3x week & due to COVID has been doing work out on [employer] w/, she was using 10#DB over head & has decreased weight to 4# or no weight but is still having pain, also notes she started Yoga a few weeks ago, she had 6 month f-u appt w/PCP on 21, discussed shoulder issues, XRays were ordered which reveaed (-)fx or dislocation but moderate DJD B shoulders & pt was referred to OT, pt notes resting pain  2/10, w/use 3/10, ^to 6-7/10 at worst, asael when lifting overhead pending weight & w/sleeping     Start Time  1502     Stop Time  1606     Time Calculation (min)  64 min     Patient reported fall since last visit  No        Pain Assessment    Currently  in pain  Yes     Preferred Pain Scale  number (Numeric Rating Pain Scale)     Pain: Body location  Shoulder     Pain Rating (0-10): Pre Activity  1    reports pain meidal side of B upper arms    Pain Rating (0-10): Post Activity  0        Pain Interventions    Intervention   MH B shoulders for warm up, TE, ice     Post Intervention Comments  therapy reduced pain         Daily Treatment Assessment and Plan - 03/10/21 1558        Daily Treatment Assessment and Plan    Progress toward goals  Progressing     Daily Outcome Summary  continued to challenge BUE & progress strength, pt was able to progress resistance on UBE  by .5 to L3, she was able to ^DB weights by 1# to 3# w/BUE for 2 sets of 10, pt progressed resistance w/TB to green for scap retraction for 2 sets of 10 & B horiz abd to red for 1 set of 10, added lat pull downs & tricep over door w/green TB & PNF diagonals w/red w/good tolerance, vc's required w/various exercises for proper technique & to avoid trunk & shoulder substitution     Plan and Recommendations  con't w/POC, progressing strength as nneka           Today's Treatment:      SHOULDER OT FLOW SHEET    EXERCISES CURRENT SESSION TIME   SELF-CARE TOTAL TIME FOR SESSION Not Performed    education/proper positioning  02/28-discussed proper sleeping/sitting positions w/use of pillows & gave HO                   THER EX TOTAL TIME FOR SESSION 38-52 MInutes    PROM RUE  02/25-PROM R shoulder     ER/scap retraction S  02/25-added ER/scap retraction S today 3x15 sec H     UBE  03/10- ^L 3 (^.5), 3 min F, 3 min B     pulleys      dowel stretches BUE                                    FF                          Abd                           Ext                          ER    03/10- FF 3x15 sec H   03/10- Abd 3x 5 sec H   03/10- Ext 3x15 sec H     pendulum      IR Stretch  03/10- IR strap S 3x15 sec H     Post cap S  03/10- post cap S B UE 3x15 sec H      Inferior cap S  03/10- inferior cap S BUE 3x15 sec H     Bicep/chest S      wrist S    flexor w/forearm sup & pron   extensor            Wall climb/finger ladder      Lat flex      Lev scap           DB exercises BUE   FF   scaption   Abd   Bicep curls   Forearm    press up    03/10- FF R ^3#2x10, L ^3#2x10   03/10-scaption R ^3# 2x10, L ^3#2x10   03/10-Abd R ^3# 2x10, L ^3#2x10       03/10- press up ^3# 2x10    Ball on wall ex  Punches  Circles, reverse  Alphabet writing  02/17-added ball on wall ex today BUE   02/17-added punches BUE 10xs   02/17-added circles & reverse BUE 10xs      Wall push ups   table/plinth push ups   mat push ups      planks      MB exercises  02/25-2# MB ex     trampoline throw 20xs   RUE single arm circles R B directions 10xs   LUE circles B directions 10xs   press up & out BUE 10xs   Ball pass behind back R>L, L>R 10xs each way   added PNF diagonals BUE & B directions today 10xs each way              TB BUE           flex          Abd          scaption          IR          ER          scap retraction          B Ext     02/12- FF red 2x10   02/12- Abd yellow 2x10   02/12- scaption yellow 2x10   03/10- IR BUE red 2x10   03/10- ER BUE red 2x10   03/10- Scap retr ^green 2x10   03/10- B Ext red 2x10     TB  tricep          Lat pull downs          B horiz abd          Diagonals  03/10- added tricep w/green today 1x10   03/10- added lat pull downs w/green today 1x10   03/10- B horiz abd ^red 1x10   03/10- added PNF Diagonals today w/red 1x10 B directions BUE           DB prone rows                   Ext                   horiz abd        DB supine punch ups                       Triceps                       FF  02/12-punch ups 2#2x10      DB side lying IR                          ER      sleeper S           RA ROM BUE  03/04-RA AROM BUE-see flow sheet     RA str BUE      PSS  (IE 67/100)  03/04- PSS ^to 77/100    MODALITIES TOTAL TIME FOR SESSION 08-22 Minutes     MH B shoulder  03/10- MH B shoulders x 8 min     ice B shoulder   03/10- ice B shoulders x 5 min                    THER ACT TOTAL TIME FOR SESSION  Not Performed

## 2021-03-12 ENCOUNTER — HOSPITAL ENCOUNTER (OUTPATIENT)
Dept: OCCUPATIONAL THERAPY | Age: 71
Setting detail: THERAPIES SERIES
Discharge: HOME | End: 2021-03-12
Attending: NURSE PRACTITIONER
Payer: MEDICARE

## 2021-03-12 DIAGNOSIS — M75.52 BILATERAL SHOULDER BURSITIS: Primary | ICD-10-CM

## 2021-03-12 DIAGNOSIS — M75.51 BILATERAL SHOULDER BURSITIS: Primary | ICD-10-CM

## 2021-03-12 PROCEDURE — 97010 HOT OR COLD PACKS THERAPY: CPT | Mod: GO

## 2021-03-12 PROCEDURE — 97110 THERAPEUTIC EXERCISES: CPT | Mod: GO

## 2021-03-12 NOTE — OP OT TREATMENT LOG
SHOULDER OT FLOW SHEET    EXERCISES CURRENT SESSION TIME   SELF-CARE TOTAL TIME FOR SESSION Not Performed    education/proper positioning  02/28-discussed proper sleeping/sitting positions w/use of pillows & gave HO                   THER EX TOTAL TIME FOR SESSION 38-52 MInutes    PROM RUE  02/25-PROM R shoulder     ER/scap retraction S  02/25-added ER/scap retraction S today 3x15 sec H     UBE  03/12- L 3, 3 min F, 3 min B     pulleys      dowel stretches BUE                                    FF                          Abd                           Ext                          ER    03/10- FF 3x15 sec H   03/12- Abd 3x 5 sec H   03/12- Ext 3x15 sec H     pendulum      IR Stretch  03/12- IR strap S 3x15 sec H     Post cap S  03/12- post cap S B UE 3x15 sec H      Inferior cap S  03/12- inferior cap S BUE 3x15 sec H     pect S 90/90  03/12- added pect S 90/90 today 3x15 sec H    Bicep/chest S  03/12- added bicep/chest S today 3x15 sec H     wrist S    flexor w/forearm sup & pron   extensor            Wall climb/finger ladder      Lat flex      Lev scap           DB exercises BUE   FF   scaption   Abd   Bicep curls   Forearm    press up    03/12- FF R 3#2x10, L 3#2x10   03/12-scaption R 3# 2x10, L 3#2x10   03/12-Abd R 3# 2x10, L 3#2x10       03/12- press up 3# 2x10    Ball on wall ex  Punches  Circles, reverse  Alphabet writing  02/17-added ball on wall ex today BUE   02/17-added punches BUE 10xs   02/17-added circles & reverse BUE 10xs      Wall push ups   table/plinth push ups   mat push ups      planks      MB exercises  03/12-2# MB ex     trampoline throw 20xs   RUE single arm circles R B directions 10xs   LUE circles B directions 10xs   press up & out BUE 10xs   Ball pass behind back R>L, L>R 10xs each way   PNF diagonals BUE & B directions today 10xs each way              TB BUE           flex          Abd          scaption          IR          ER          scap retraction          B Ext      02/12- FF red 2x10   02/12- Abd yellow 2x10   02/12- scaption yellow 2x10   03/12- IR BUE ^green 2x10   03/12- ER BUE ^green 2x10   03/12- Scap retr green 2x10   03/12- B Ext red 2x10     TB  tricep          Lat pull downs          B horiz abd          Diagonals  03/12-tricep w/green ^2x10   03/12-lat pull downs w/green ^2x10   03/12- B horiz abd red ^2x10   03/10- added PNF Diagonals today w/red 1x10 B directions BUE           DB prone rows                   Ext                   horiz abd        DB supine punch ups                       Triceps                       FF  02/12-punch ups 2#2x10      DB side lying IR                          ER      sleeper S           RA ROM BUE  03/04-RA AROM BUE-see flow sheet     RA str BUE      PSS  (IE 67/100)  03/04- PSS ^to 77/100    MODALITIES TOTAL TIME FOR SESSION 08-22 Minutes     MH B shoulder  03/12- MH B shoulders x 8 min     ice B shoulder  03/12- ice B shoulders x 5 min                    THER ACT TOTAL TIME FOR SESSION  Not Performed

## 2021-03-12 NOTE — PROGRESS NOTES
OT DAILY NOTE FOR OUTPATIENT THERAPY    Patient: Muriel Franklin   MRN: [id number]  : 1950 71 y.o.  Referring Physician: Isadora Rebolledo CR*  Date of Visit: 3/12/2021      Certification Dates: 21 through 21    Diagnosis:   1. Bilateral shoulder bursitis        Chief Complaints:   Dec ROM, Dec Strength, Pain, Self Care Difficulties, Decreased recreational/play activity    Precautions:  Existing Precautions/Restrictions: no known precautions/restrictions    TODAY'S VISIT    History/Vitals/Pain/Encounter Info - 21 1007        Injury History/Precautions/Daily Required Info    Document Type  daily treatment     Primary Therapist  Pinky Arnett, OTR/L     Chief Complaint/Reason for Visit   Dec ROM, Dec Strength, Pain, Self Care Difficulties, Decreased recreational/play activity     Referring Physician  Dr Potter     Existing Precautions/Restrictions  no known precautions/restrictions     Pertinent History of Current Functional Problem  Pt reports pain L upper arm (bicep/tricep/mid delt area) on & off for ~1 year then ~9 months ago, intermittent pain started in R shoulder/upper arm which has become constant w/in the past 6 months or so, pt notes R shoulder pain>L shoulder but L upper arm pain>R upper arm pain, pt was going to gym 3x week & due to COVID has been doing work out on [employer] w/, she was using 10#DB over head & has decreased weight to 4# or no weight but is still having pain, also notes she started Yoga a few weeks ago, she had 6 month f-u appt w/PCP on 21, discussed shoulder issues, XRays were ordered which reveaed (-)fx or dislocation but moderate DJD B shoulders & pt was referred to OT, pt notes resting pain  2/10, w/use 3/10, ^to 6-7/10 at worst, asael when lifting overhead pending weight & w/sleeping     Start Time  1005     Stop Time  1112     Time Calculation (min)  67 min     Patient reported fall since last visit  No        Pain Assessment    Currently  in pain  Yes     Preferred Pain Scale  number (Numeric Rating Pain Scale)     Pain: Body location  Shoulder     Pain Rating (0-10): Pre Activity  --    1/2/10 doffing coat    Pain Rating (0-10): Post Activity  0        Pain Interventions    Intervention   MH B shoulders for warm up, TE, ice     Post Intervention Comments  therapy reduced pain         Daily Treatment Assessment and Plan - 03/12/21 1033        Daily Treatment Assessment and Plan    Progress toward goals  Progressing     Daily Outcome Summary  pt reported slight pain doffing her coat this morning, added pect stretch 90/90 & bicep/chest S. pt reported initial pain w/bicep/chest stretch, had her adjust arm position which did decrease her pain, pt was able to progress resistance w/TB by 1 level, to green for IR & ER w/BUE & ^reps by 10 for 2 sets of 10 w/3 exercises (lat pull downs, tricep & B horiz abd) w/good tolerance, gave pt pictures of new exercises for home, nneka session well     Plan and Recommendations  con't w/POC, progressing strength as nneka           Today's Treatment:      SHOULDER OT FLOW SHEET    EXERCISES CURRENT SESSION TIME   SELF-CARE TOTAL TIME FOR SESSION Not Performed    education/proper positioning  02/28-discussed proper sleeping/sitting positions w/use of pillows & gave HO                   THER EX TOTAL TIME FOR SESSION 38-52 MInutes    PROM RUE  02/25-PROM R shoulder     ER/scap retraction S  02/25-added ER/scap retraction S today 3x15 sec H     UBE  03/12- L 3, 3 min F, 3 min B     pulleys      dowel stretches BUE                                    FF                          Abd                           Ext                          ER    03/10- FF 3x15 sec H   03/12- Abd 3x 5 sec H   03/12- Ext 3x15 sec H     pendulum      IR Stretch  03/12- IR strap S 3x15 sec H     Post cap S  03/12- post cap S B UE 3x15 sec H      Inferior cap S  03/12- inferior cap S BUE 3x15 sec H     pect S 90/90 03/12- added pect S 90/90 today 3x15 sec  H    Bicep/chest S  03/12- added bicep/chest S today 3x15 sec H     wrist S    flexor w/forearm sup & pron   extensor            Wall climb/finger ladder      Lat flex      Lev scap           DB exercises BUE   FF   scaption   Abd   Bicep curls   Forearm    press up    03/12- FF R 3#2x10, L 3#2x10   03/12-scaption R 3# 2x10, L 3#2x10   03/12-Abd R 3# 2x10, L 3#2x10       03/12- press up 3# 2x10    Ball on wall ex  Punches  Circles, reverse  Alphabet writing  02/17-added ball on wall ex today BUE   02/17-added punches BUE 10xs   02/17-added circles & reverse BUE 10xs      Wall push ups   table/plinth push ups   mat push ups      planks      MB exercises  03/12-2# MB ex     trampoline throw 20xs   RUE single arm circles R B directions 10xs   LUE circles B directions 10xs   press up & out BUE 10xs   Ball pass behind back R>L, L>R 10xs each way   PNF diagonals BUE & B directions today 10xs each way              TB BUE           flex          Abd          scaption          IR          ER          scap retraction          B Ext     02/12- FF red 2x10   02/12- Abd yellow 2x10   02/12- scaption yellow 2x10   03/12- IR BUE ^green 2x10   03/12- ER BUE ^green 2x10   03/12- Scap retr green 2x10   03/12- B Ext red 2x10     TB  tricep          Lat pull downs          B horiz abd          Diagonals  03/12-tricep w/green ^2x10   03/12-lat pull downs w/green ^2x10   03/12- B horiz abd red ^2x10   03/10- added PNF Diagonals today w/red 1x10 B directions BUE           DB prone rows                   Ext                   horiz abd        DB supine punch ups                       Triceps                       FF  02/12-punch ups 2#2x10      DB side lying IR                          ER      sleeper S           RA ROM BUE  03/04-RA AROM BUE-see flow sheet     RA str BUE      PSS  (IE 67/100)  03/04- PSS ^to 77/100    MODALITIES TOTAL TIME FOR SESSION 08-22 Minutes     MH B shoulder  03/12- MH B shoulders x 8 min     ice B  shoulder  03/12- ice B shoulders x 5 min                    THER ACT TOTAL TIME FOR SESSION  Not Performed

## 2021-03-16 ENCOUNTER — HOSPITAL ENCOUNTER (OUTPATIENT)
Dept: OCCUPATIONAL THERAPY | Age: 71
Setting detail: THERAPIES SERIES
Discharge: HOME | End: 2021-03-16
Attending: NURSE PRACTITIONER
Payer: MEDICARE

## 2021-03-16 DIAGNOSIS — M75.52 BILATERAL SHOULDER BURSITIS: Primary | ICD-10-CM

## 2021-03-16 DIAGNOSIS — M75.51 BILATERAL SHOULDER BURSITIS: Primary | ICD-10-CM

## 2021-03-16 PROCEDURE — 97110 THERAPEUTIC EXERCISES: CPT | Mod: GO

## 2021-03-16 PROCEDURE — 97010 HOT OR COLD PACKS THERAPY: CPT | Mod: GO

## 2021-03-16 NOTE — PROGRESS NOTES
"OT DAILY NOTE FOR OUTPATIENT THERAPY    Patient: Muriel Franklin   MRN: [id number]  : 1950 71 y.o.  Referring Physician: Isadora Rebolledo CR*  Date of Visit: 3/16/2021      Certification Dates: 21 through 21    Diagnosis:   1. Bilateral shoulder bursitis        Chief Complaints:   Dec ROM, Dec Strength, Pain, Self Care Difficulties, Decreased recreational/play activity    Precautions:  Existing Precautions/Restrictions: no known precautions/restrictions    TODAY'S VISIT    History/Vitals/Pain/Encounter Info - 21 1020        Injury History/Precautions/Daily Required Info    Document Type  daily treatment     Primary Therapist  Pinky Arnett, OTR/L     Chief Complaint/Reason for Visit   Dec ROM, Dec Strength, Pain, Self Care Difficulties, Decreased recreational/play activity     Referring Physician  Dr Potter     Existing Precautions/Restrictions  no known precautions/restrictions     Pertinent History of Current Functional Problem  Pt reports pain L upper arm (bicep/tricep/mid delt area) on & off for ~1 year then ~9 months ago, intermittent pain started in R shoulder/upper arm which has become constant w/in the past 6 months or so, pt notes R shoulder pain>L shoulder but L upper arm pain>R upper arm pain, pt was going to gym 3x week & due to COVID has been doing work out on [employer] w/, she was using 10#DB over head & has decreased weight to 4# or no weight but is still having pain, also notes she started Yoga a few weeks ago, she had 6 month f-u appt w/PCP on 21, discussed shoulder issues, XRays were ordered which reveaed (-)fx or dislocation but moderate DJD B shoulders & pt was referred to OT, pt notes resting pain  2/10, w/use 3/10, ^to 6-7/10 at worst, asael when lifting overhead pending weight & w/sleeping     Patient/Family/Caregiver Comments/Observations  \"I was achey all over yesterday starting around mid day, the arthritis was kicking in\", weather was cold & " "windy yesterday, pt notes she took aleve late afternoon yesterday & is feeling better today, notes she \"slept well\" last night     Start Time  1002     Stop Time  1110     Time Calculation (min)  68 min     Patient reported fall since last visit  No        Pain Assessment    Currently in pain  No/Denies     Preferred Pain Scale  number (Numeric Rating Pain Scale)     Pain Rating (0-10): Pre Activity  0     Pain Rating (0-10): Post Activity  --    1/2/10       Pain Interventions    Intervention   MH B shoulders, TE, ice     Post Intervention Comments  slight pain but exercises were added & reps/weights were increased w/several ex         Daily Treatment Assessment and Plan - 03/16/21 1215        Daily Treatment Assessment and Plan    Progress toward goals  Progressing     Daily Outcome Summary  Despite feeling achy yesterday, pt did quite well today. She was able to ^reps w/PNF diagonals w/red TB by 10 for 2 sets of 10, vc's required for proper technique, she was able to ^DB weight by 1# to 3# for supine punch ups, tactile cueing required to maintain shoulders at 90 deg flexion, added tricep in supine w/2#DB, weakness evident in LUE w/arm shaking mid range thru extension of elbow, also added 3 DB ex in prone, rows w/2#, ext & horiz abd w/1#, which were a good challenge for her w/BUE, also added ER doorway stretch BUE which she tolerated well, good session     Plan and Recommendations  con't w/POC, progressing strength as nneka         Today's Treatment:      SHOULDER OT FLOW SHEET    EXERCISES CURRENT SESSION TIME   SELF-CARE TOTAL TIME FOR SESSION Not Performed    education/proper positioning  02/28-discussed proper sleeping/sitting positions w/use of pillows & gave HO                   THER EX TOTAL TIME FOR SESSION 38-52 MInutes    PROM RUE  02/25-PROM R shoulder     ER/scap retraction S  02/25-added ER/scap retraction S today 3x15 sec H     UBE  03/16- L 3, 3 min F, 3 min B     pulleys      dowel stretches BUE      "                               FF                          Abd                           Ext                          ER    03/10- FF 3x15 sec H   03/16- Abd 3x 5 sec H   03/16- Ext 3x15 sec H     ER doorway  03/16- added ER doorway S today BUE 3x15 sec H     pendulum      IR Stretch  03/16- IR strap S 3x15 sec H     Post cap S  03/16- post cap S B UE 3x15 sec H      Inferior cap S  03/16- inferior cap S BUE 3x15 sec H     pect S 90/90  03/16- pect S 90/90  3x15 sec H    Bicep/chest S  03/16- bicep/chest S 3x15 sec H     Lat flex      Lev scap           DB exercises BUE   FF   scaption   Abd   Bicep curls   Forearm    press up    03/16- FF R 3#2x10, L 3#2x10   03/16-scaption R 3# 2x10, L 3#2x10   03/16-Abd R 3# 2x10, L 3#2x10       03/12- press up 3# 2x10    Ball on wall ex  Punches  Circles, reverse  Alphabet writing  02/17-added ball on wall ex today BUE   02/17-added punches BUE 10xs   02/17-added circles & reverse BUE 10xs      Wall push ups   table/plinth push ups   mat push ups      planks      MB exercises  03/12-2# MB ex     trampoline throw 20xs   RUE single arm circles R B directions 10xs   LUE circles B directions 10xs   press up & out BUE 10xs   Ball pass behind back R>L, L>R 10xs each way   PNF diagonals BUE & B directions today 10xs each way              TB BUE           flex          Abd          scaption          IR          ER          scap retraction          B Ext     02/12- FF red 2x10   02/12- Abd yellow 2x10   02/12- scaption yellow 2x10   03/16- IR BUE green 2x10   03/16- ER BUE green 2x10   03/12- Scap retr green 2x10   03/12- B Ext red 2x10     TB  tricep          Lat pull downs          B horiz abd          Diagonals  03/12-tricep w/green ^2x10   03/12-lat pull downs w/green ^2x10   03/16- B horiz abd red 2x10   03/16-PNF Diagonals w/red ^2x10 B directions BUE           DB prone rows                   Ext                   horiz abd  03/16-added rows today BUE w/2#2x10    03/16-added ext today BUE w/1#2x10   03/16-added horiz abd BUE w/1#2x10       DB supine punch ups                       Triceps                       FF  03/16-punch ups ^3#2x10   03/16-added tricep w/2#2x10      DB side lying IR                          ER      sleeper S           RA ROM BUE  03/04-RA AROM BUE-see flow sheet     RA str BUE      PSS  (IE 67/100)  03/04- PSS ^to 77/100    MODALITIES TOTAL TIME FOR SESSION 08-22 Minutes     MH B shoulder  03/16- MH B shoulders x 8 min     ice B shoulder  03/16- ice B shoulders x 5 min                    THER ACT TOTAL TIME FOR SESSION  Not Performed

## 2021-03-16 NOTE — OP OT TREATMENT LOG
SHOULDER OT FLOW SHEET    EXERCISES CURRENT SESSION TIME   SELF-CARE TOTAL TIME FOR SESSION Not Performed    education/proper positioning  02/28-discussed proper sleeping/sitting positions w/use of pillows & gave HO                   THER EX TOTAL TIME FOR SESSION 38-52 MInutes    PROM RUE  02/25-PROM R shoulder     ER/scap retraction S  02/25-added ER/scap retraction S today 3x15 sec H     UBE  03/16- L 3, 3 min F, 3 min B     pulleys      dowel stretches BUE                                    FF                          Abd                           Ext                          ER    03/10- FF 3x15 sec H   03/16- Abd 3x 5 sec H   03/16- Ext 3x15 sec H     ER doorway  03/16- added ER doorway S today BUE 3x15 sec H     pendulum      IR Stretch  03/16- IR strap S 3x15 sec H     Post cap S  03/16- post cap S B UE 3x15 sec H      Inferior cap S  03/16- inferior cap S BUE 3x15 sec H     pect S 90/90  03/16- pect S 90/90  3x15 sec H    Bicep/chest S  03/16- bicep/chest S 3x15 sec H     Lat flex      Lev scap           DB exercises BUE   FF   scaption   Abd   Bicep curls   Forearm    press up    03/16- FF R 3#2x10, L 3#2x10   03/16-scaption R 3# 2x10, L 3#2x10   03/16-Abd R 3# 2x10, L 3#2x10       03/12- press up 3# 2x10    Ball on wall ex  Punches  Circles, reverse  Alphabet writing  02/17-added ball on wall ex today BUE   02/17-added punches BUE 10xs   02/17-added circles & reverse BUE 10xs      Wall push ups   table/plinth push ups   mat push ups      planks      MB exercises  03/12-2# MB ex     trampoline throw 20xs   RUE single arm circles R B directions 10xs   LUE circles B directions 10xs   press up & out BUE 10xs   Ball pass behind back R>L, L>R 10xs each way   PNF diagonals BUE & B directions today 10xs each way              TB BUE           flex          Abd          scaption          IR          ER          scap retraction          B Ext     02/12- FF red 2x10   02/12- Abd yellow 2x10   02/12-  scaption yellow 2x10   03/16- IR BUE green 2x10   03/16- ER BUE green 2x10   03/12- Scap retr green 2x10   03/12- B Ext red 2x10     TB  tricep          Lat pull downs          B horiz abd          Diagonals  03/12-tricep w/green ^2x10   03/12-lat pull downs w/green ^2x10   03/16- B horiz abd red 2x10   03/16-PNF Diagonals w/red ^2x10 B directions BUE           DB prone rows                   Ext                   horiz abd  03/16-added rows today BUE w/2#2x10   03/16-added ext today BUE w/1#2x10   03/16-added horiz abd BUE w/1#2x10       DB supine punch ups                       Triceps                       FF  03/16-punch ups ^3#2x10   03/16-added tricep w/2#2x10      DB side lying IR                          ER      sleeper S           RA ROM BUE  03/04-RA AROM BUE-see flow sheet     RA str BUE      PSS  (IE 67/100)  03/04- PSS ^to 77/100    MODALITIES TOTAL TIME FOR SESSION 08-22 Minutes     MH B shoulder  03/16- MH B shoulders x 8 min     ice B shoulder  03/16- ice B shoulders x 5 min                    THER ACT TOTAL TIME FOR SESSION  Not Performed

## 2021-03-19 ENCOUNTER — HOSPITAL ENCOUNTER (OUTPATIENT)
Dept: OCCUPATIONAL THERAPY | Age: 71
Setting detail: THERAPIES SERIES
Discharge: HOME | End: 2021-03-19
Attending: NURSE PRACTITIONER
Payer: MEDICARE

## 2021-03-19 DIAGNOSIS — M75.52 BILATERAL SHOULDER BURSITIS: Primary | ICD-10-CM

## 2021-03-19 DIAGNOSIS — M75.51 BILATERAL SHOULDER BURSITIS: Primary | ICD-10-CM

## 2021-03-19 PROCEDURE — 97110 THERAPEUTIC EXERCISES: CPT | Mod: GO

## 2021-03-19 PROCEDURE — 97010 HOT OR COLD PACKS THERAPY: CPT | Mod: GO

## 2021-03-19 NOTE — PROGRESS NOTES
"OT DAILY NOTE FOR OUTPATIENT THERAPY    Patient: Muriel Franklin   MRN: [id number]  : 1950 71 y.o.  Referring Physician: Isadora Rebolledo CR*  Date of Visit: 3/19/2021      Certification Dates: 21 through 21    Diagnosis:   1. Bilateral shoulder bursitis        Chief Complaints:   Dec ROM, Dec Strength, Pain, Self Care Difficulties, Decreased recreational/play activity    Precautions:  Existing Precautions/Restrictions: no known precautions/restrictions    TODAY'S VISIT    History/Vitals/Pain/Encounter Info - 21 1035        Injury History/Precautions/Daily Required Info    Document Type  daily treatment     Primary Therapist  Pinky Arnett, OTR/L     Chief Complaint/Reason for Visit   Dec ROM, Dec Strength, Pain, Self Care Difficulties, Decreased recreational/play activity     Referring Physician  Dr Potter     Existing Precautions/Restrictions  no known precautions/restrictions     Pertinent History of Current Functional Problem  Pt reports pain L upper arm (bicep/tricep/mid delt area) on & off for ~1 year then ~9 months ago, intermittent pain started in R shoulder/upper arm which has become constant w/in the past 6 months or so, pt notes R shoulder pain>L shoulder but L upper arm pain>R upper arm pain, pt was going to gym 3x week & due to COVID has been doing work out on [employer] w/, she was using 10#DB over head & has decreased weight to 4# or no weight but is still having pain, also notes she started Yoga a few weeks ago, she had 6 month f-u appt w/PCP on 21, discussed shoulder issues, XRays were ordered which reveaed (-)fx or dislocation but moderate DJD B shoulders & pt was referred to OT, pt notes resting pain  2/10, w/use 3/10, ^to 6-7/10 at worst, asael when lifting overhead pending weight & w/sleeping     Patient/Family/Caregiver Comments/Observations  \"I'm a little achy today but I have not taken any medicine since monday\" (03/15), the weather is cold & " ralph     Start Time  1005     Stop Time  1110     Time Calculation (min)  65 min     Patient reported fall since last visit  No        Pain Assessment    Currently in pain  Yes     Preferred Pain Scale  number (Numeric Rating Pain Scale)     Pain: Body location  Shoulder     Pain Rating (0-10): Pre Activity  --    1 1/2/10 R top of sh, 1/10 L upper arm    Pain Rating (0-10): Post Activity  --    1/10 R, 1 1/2 L       Pain Interventions    Intervention   MH B shoulders, TE, ice     Post Intervention Comments  pain decreased in R, slightly increased in L, resistance was ^w/4 exercises         Daily Treatment Assessment and Plan - 03/19/21 1108        Daily Treatment Assessment and Plan    Progress toward goals  Progressing     Daily Outcome Summary  Pt was able to ^resistance w/4 TB exercises today by 1 level, to blue for tricep over door, lat pull downs & scap retraction & to green for B Ext which she tolerated well, better control BUE during supine punch ups & no tactile cues needed to maintain shoulders at 90 deg flexion which was required last session, LUE still shaky during tricep DB ex in supine mid range thru extension of elbow, gave pt blue TB for home & updated HEP     Plan and Recommendations  con't w/POC, progressing strength as nneka             Today's Treatment:      SHOULDER OT FLOW SHEET    EXERCISES CURRENT SESSION TIME   SELF-CARE TOTAL TIME FOR SESSION Not Performed    education/proper positioning  02/28-discussed proper sleeping/sitting positions w/use of pillows & gave HO                   THER EX TOTAL TIME FOR SESSION 38-52 MInutes    PROM RUE  03/19-PROM R shoulder     ER/scap retraction S  03/19-ER/scap retraction S 3x15 sec H     UBE  03/19- L 3, 3 min F, 3 min B     pulleys      dowel stretches BUE                                    FF                          Abd                           Ext                          ER    03/19- FF 3x15 sec H   03/19- Abd 3x 5 sec H   03/19- Ext 3x15 sec H      ER doorway  03/19- ER doorway S  BUE 3x15 sec H     pendulum      IR Stretch  03/19- IR strap S 3x15 sec H     Post cap S  03/19- post cap S B UE 3x15 sec H      Inferior cap S  03/19- inferior cap S BUE 3x15 sec H     pect S 90/90  03/19- pect S 90/90  3x15 sec H    Bicep/chest S  03/16- bicep/chest S 3x15 sec H     Lat flex      Lev scap           DB exercises BUE   FF   scaption   Abd   Bicep curls   Forearm    press up    03/16- FF R 3#2x10, L 3#2x10   03/19-scaption R 3# 2x10, L 3#2x10   03/16-Abd R 3# 2x10, L 3#2x10       03/19- press up 3# 2x10    Ball on wall ex  Punches  Circles, reverse  Alphabet writing  02/17-added ball on wall ex today BUE   02/17-added punches BUE 10xs   02/17-added circles & reverse BUE 10xs      Wall push ups   table/plinth push ups   mat push ups      planks      MB exercises  03/19-2# MB ex     trampoline throw 20xs   RUE single arm circles R B directions 10xs   LUE circles B directions 10xs   press up & out BUE 10xs   Ball pass behind back R>L, L>R 10xs each way   PNF diagonals BUE & B directions today 10xs each way              TB BUE           flex          Abd          scaption          IR          ER          scap retraction          B Ext     02/12- FF red 2x10   02/12- Abd yellow 2x10   02/12- scaption yellow 2x10   03/16- IR BUE green 2x10   03/16- ER BUE green 2x10   03/19- Scap retr ^blue 2x10   03/19- B Ext ^green 2x10     TB  tricep          Lat pull downs          B horiz abd          Diagonals  03/19-tricep ^blue 2x10   03/19-lat pull downs ^blue 2x10   03/16- B horiz abd red 2x10   03/16-PNF Diagonals w/red ^2x10 B directions BUE           DB prone rows                   Ext                   horiz abd  03/16-added rows today BUE w/2#2x10   03/16-added ext today BUE w/1#2x10   03/16-added horiz abd BUE w/1#2x10       DB supine punch ups                       Triceps                       FF  03/19-punch ups 3#2x10   03/19-tricep 2#2x10      DB side  lying IR                          ER      sleeper S           RA ROM BUE  03/04-RA AROM BUE-see flow sheet     RA str BUE      PSS  (IE 67/100)  03/04- PSS ^to 77/100    MODALITIES TOTAL TIME FOR SESSION 08-22 Minutes     MH B shoulder  03/19- MH B shoulders x 8 min     ice B shoulder  03/19- ice B shoulders x 5 min                    THER ACT TOTAL TIME FOR SESSION  Not Performed

## 2021-03-19 NOTE — OP OT TREATMENT LOG
SHOULDER OT FLOW SHEET    EXERCISES CURRENT SESSION TIME   SELF-CARE TOTAL TIME FOR SESSION Not Performed    education/proper positioning  02/28-discussed proper sleeping/sitting positions w/use of pillows & gave HO                   THER EX TOTAL TIME FOR SESSION 38-52 MInutes    PROM RUE  03/19-PROM R shoulder     ER/scap retraction S  03/19-ER/scap retraction S 3x15 sec H     UBE  03/19- L 3, 3 min F, 3 min B     pulleys      dowel stretches BUE                                    FF                          Abd                           Ext                          ER    03/19- FF 3x15 sec H   03/19- Abd 3x 5 sec H   03/19- Ext 3x15 sec H     ER doorway  03/19- ER doorway S  BUE 3x15 sec H     pendulum      IR Stretch  03/19- IR strap S 3x15 sec H     Post cap S  03/19- post cap S B UE 3x15 sec H      Inferior cap S  03/19- inferior cap S BUE 3x15 sec H     pect S 90/90  03/19- pect S 90/90  3x15 sec H    Bicep/chest S  03/16- bicep/chest S 3x15 sec H     Lat flex      Lev scap           DB exercises BUE   FF   scaption   Abd   Bicep curls   Forearm    press up    03/16- FF R 3#2x10, L 3#2x10   03/19-scaption R 3# 2x10, L 3#2x10   03/16-Abd R 3# 2x10, L 3#2x10       03/19- press up 3# 2x10    Ball on wall ex  Punches  Circles, reverse  Alphabet writing  02/17-added ball on wall ex today BUE   02/17-added punches BUE 10xs   02/17-added circles & reverse BUE 10xs      Wall push ups   table/plinth push ups   mat push ups      planks      MB exercises  03/19-2# MB ex     trampoline throw 20xs   RUE single arm circles R B directions 10xs   LUE circles B directions 10xs   press up & out BUE 10xs   Ball pass behind back R>L, L>R 10xs each way   PNF diagonals BUE & B directions today 10xs each way              TB BUE           flex          Abd          scaption          IR          ER          scap retraction          B Ext     02/12- FF red 2x10   02/12- Abd yellow 2x10   02/12- scaption yellow 2x10    03/16- IR BUE green 2x10   03/16- ER BUE green 2x10   03/19- Scap retr ^blue 2x10   03/19- B Ext ^green 2x10     TB  tricep          Lat pull downs          B horiz abd          Diagonals  03/19-tricep ^blue 2x10   03/19-lat pull downs ^blue 2x10   03/16- B horiz abd red 2x10   03/16-PNF Diagonals w/red ^2x10 B directions BUE           DB prone rows                   Ext                   horiz abd  03/16-added rows today BUE w/2#2x10   03/16-added ext today BUE w/1#2x10   03/16-added horiz abd BUE w/1#2x10       DB supine punch ups                       Triceps                       FF  03/19-punch ups 3#2x10   03/19-tricep 2#2x10      DB side lying IR                          ER      sleeper S           RA ROM BUE  03/04-RA AROM BUE-see flow sheet     RA str BUE      PSS  (IE 67/100)  03/04- PSS ^to 77/100    MODALITIES TOTAL TIME FOR SESSION 08-22 Minutes     MH B shoulder  03/19- MH B shoulders x 8 min     ice B shoulder  03/19- ice B shoulders x 5 min                    THER ACT TOTAL TIME FOR SESSION  Not Performed

## 2021-03-23 ENCOUNTER — HOSPITAL ENCOUNTER (OUTPATIENT)
Dept: OCCUPATIONAL THERAPY | Age: 71
Setting detail: THERAPIES SERIES
Discharge: HOME | End: 2021-03-23
Attending: NURSE PRACTITIONER
Payer: MEDICARE

## 2021-03-23 DIAGNOSIS — M75.51 BILATERAL SHOULDER BURSITIS: Primary | ICD-10-CM

## 2021-03-23 DIAGNOSIS — M75.52 BILATERAL SHOULDER BURSITIS: Primary | ICD-10-CM

## 2021-03-23 PROCEDURE — 97010 HOT OR COLD PACKS THERAPY: CPT | Mod: GO

## 2021-03-23 PROCEDURE — 97110 THERAPEUTIC EXERCISES: CPT | Mod: GO

## 2021-03-23 NOTE — OP OT TREATMENT LOG
SHOULDER OT FLOW SHEET    EXERCISES CURRENT SESSION TIME   SELF-CARE TOTAL TIME FOR SESSION Not Performed    education/proper positioning  02/28-discussed proper sleeping/sitting positions w/use of pillows & gave HO                   THER EX TOTAL TIME FOR SESSION 38-52 MInutes    PROM RUE  03/23-PROM R shoulder     ER/scap retraction S  03/23-ER/scap retraction S 3x15 sec H     UBE  03/23- L 3, 3 min F, 3 min B     pulleys      dowel stretches BUE                                    FF                          Abd                           Ext                          ER    03/23- FF 3x15 sec H   03/23- Abd 3x 5 sec H   03/23- Ext 3x15 sec H     ER doorway  03/23- ER doorway S  BUE 3x15 sec H     pendulum      IR Stretch  03/23- IR strap S 3x15 sec H     Post cap S  03/23- post cap S B UE 3x15 sec H      Inferior cap S  03/23- inferior cap S BUE 3x15 sec H     pect S 90/90  03/23- pect S 90/90  3x15 sec H    Bicep/chest S  03/16- bicep/chest S 3x15 sec H     Lat flex      Lev scap           DB exercises BUE   FF   scaption   Abd   Bicep curls   Forearm    press up  [place] press    03/16- FF R 3#2x10, L 3#2x10   03/19-scaption R 3# 2x10, L 3#2x10   03/16-Abd R 3# 2x10, L 3#2x10       03/19- press up 3# 2x10   03/23-added [place] press today 2#1x10    Ball on wall ex  Punches  Circles, reverse  Alphabet writing  02/17-added ball on wall ex today BUE   02/17-added punches BUE 10xs   02/17-added circles & reverse BUE 10xs      Wall push ups   table/plinth push ups   mat push ups      planks      MB exercises  03/23-2# MB ex     trampoline throw 20xs,   RUE single arm circles R B directions 10xs   LUE circles B directions 10xs   press up & out BUE 10xs     03/19-2# MB ex     trampoline throw 20xs   RUE single arm circles R B directions 10xs   LUE circles B directions 10xs   press up & out BUE 10xs   Ball pass behind back R>L, L>R 10xs each way   PNF diagonals BUE & B directions today 10xs each way               TB BUE           flex          Abd          scaption          IR          ER          scap retraction          B Ext     02/12- FF red 2x10   02/12- Abd yellow 2x10   02/12- scaption yellow 2x10   03/23- IR BUE ^blue 2x10   03/23- ER BUE ^blue 2x10   03/23- Scap retr blue 2x10   03/23- B Ext ^blue 2x10     TB  tricep          Lat pull downs          B horiz abd          Diagonals  03/23-tricep blue 2x10   03/23-lat pull downs blue 2x10   03/23- B horiz abd red 2x10   03/23- PNF Diagonals w/red 2x10 B directions BUE           DB prone rows                   Ext                   horiz abd  03/16-added rows today BUE w/2#2x10   03/16-added ext today BUE w/1#2x10   03/16-added horiz abd BUE w/1#2x10       DB supine punch ups                       Triceps                       FF  03/23-punch ups 3#2x10   03/23-tricep 2#1x10      DB side lying IR                          ER      sleeper S           RA ROM BUE  03/04-RA AROM BUE-see flow sheet     RA str BUE      PSS  (IE 67/100)  03/04- PSS ^to 77/100    MODALITIES TOTAL TIME FOR SESSION 08-22 Minutes     MH B shoulder  03/23- MH B shoulders x 8 min      ice B shoulder  03/23- ice B shoulders x 5 min                    THER ACT TOTAL TIME FOR SESSION  Not Performed

## 2021-03-23 NOTE — PROGRESS NOTES
"OT DAILY NOTE FOR OUTPATIENT THERAPY    Patient: Muriel Franklin   MRN: [id number]  : 1950 71 y.o.  Referring Physician: Isadora Rebolledo CR*  Date of Visit: 3/23/2021      Certification Dates: 21 through 21    Diagnosis:   1. Bilateral shoulder bursitis        Chief Complaints:   Dec ROM, Dec Strength, Pain, Self Care Difficulties, Decreased recreational/play activity    Precautions:  Existing Precautions/Restrictions: no known precautions/restrictions    TODAY'S VISIT    History/Vitals/Pain/Encounter Info - 21 1016        Injury History/Precautions/Daily Required Info    Document Type  daily treatment     Primary Therapist  Pinky Arnett, OTR/L     Chief Complaint/Reason for Visit   Dec ROM, Dec Strength, Pain, Self Care Difficulties, Decreased recreational/play activity     Referring Physician  Dr Potter     Existing Precautions/Restrictions  no known precautions/restrictions     Pertinent History of Current Functional Problem  Pt reports pain L upper arm (bicep/tricep/mid delt area) on & off for ~1 year then ~9 months ago, intermittent pain started in R shoulder/upper arm which has become constant w/in the past 6 months or so, pt notes R shoulder pain>L shoulder but L upper arm pain>R upper arm pain, pt was going to gym 3x week & due to COVID has been doing work out on [employer] w/, she was using 10#DB over head & has decreased weight to 4# or no weight but is still having pain, also notes she started Yoga a few weeks ago, she had 6 month f-u appt w/PCP on 21, discussed shoulder issues, XRays were ordered which reveaed (-)fx or dislocation but moderate DJD B shoulders & pt was referred to OT, pt notes resting pain  2/10, w/use 3/10, ^to 6-7/10 at worst, asael when lifting overhead pending weight & w/sleeping     Patient/Family/Caregiver Comments/Observations  \"I feel pretty good today\", weather is warm     Start Time  1003     Stop Time  1105     Time Calculation " (min)  62 min     Patient reported fall since last visit  No        Pain Assessment    Currently in pain  No/Denies     Pain Rating (0-10): Pre Activity  0     Pain Rating (0-10): Post Activity  1    1/10 R shoulder, no pain L shoulder       Pain Interventions    Intervention   MH B shoulders for warm up, TE, ice     Post Intervention Comments  no pain L shoulder, slightly painful R         Daily Treatment Assessment and Plan - 03/23/21 1637        Daily Treatment Assessment and Plan    Progress toward goals  Progressing     Daily Outcome Summary  Pt was able to progress resistance w/3 TB exercises today by 1 level, to blue w/BUE for IR, ER & B ext w/good tolerance, LUE quivering during tricep ext in supine, added [place] press in standing w/2# which she tolerated well & w/o LUE quivering, pt thought she had green TB at home but notes she did not, issued her green TB today.     Plan and Recommendations  con't w/POC, progressing strength as nneka           Today's Treatment:      SHOULDER OT FLOW SHEET    EXERCISES CURRENT SESSION TIME   SELF-CARE TOTAL TIME FOR SESSION Not Performed    education/proper positioning  02/28-discussed proper sleeping/sitting positions w/use of pillows & gave HO                   THER EX TOTAL TIME FOR SESSION 38-52 MInutes    PROM RUE  03/23-PROM R shoulder     ER/scap retraction S  03/23-ER/scap retraction S 3x15 sec H     UBE  03/23- L 3, 3 min F, 3 min B     pulleys      dowel stretches BUE                                    FF                          Abd                           Ext                          ER    03/23- FF 3x15 sec H   03/23- Abd 3x 5 sec H   03/23- Ext 3x15 sec H     ER doorway  03/23- ER doorway S  BUE 3x15 sec H     pendulum      IR Stretch  03/23- IR strap S 3x15 sec H     Post cap S  03/23- post cap S B UE 3x15 sec H      Inferior cap S  03/23- inferior cap S BUE 3x15 sec H     pect S 90/90  03/23- pect S 90/90  3x15 sec H    Bicep/chest S  03/16- bicep/chest S  3x15 sec H     Lat flex      Lev scap           DB exercises BUE   FF   scaption   Abd   Bicep curls   Forearm    press up  [place]engali press    03/16- FF R 3#2x10, L 3#2x10   03/19-scaption R 3# 2x10, L 3#2x10   03/16-Abd R 3# 2x10, L 3#2x10       03/19- press up 3# 2x10   03/23-added [place] press today 2#1x10    Ball on wall ex  Punches  Circles, reverse  Alphabet writing  02/17-added ball on wall ex today BUE   02/17-added punches BUE 10xs   02/17-added circles & reverse BUE 10xs      Wall push ups   table/plinth push ups   mat push ups      planks      MB exercises  03/23-2# MB ex     trampoline throw 20xs,   RUE single arm circles R B directions 10xs   LUE circles B directions 10xs   press up & out BUE 10xs     03/19-2# MB ex     trampoline throw 20xs   RUE single arm circles R B directions 10xs   LUE circles B directions 10xs   press up & out BUE 10xs   Ball pass behind back R>L, L>R 10xs each way   PNF diagonals BUE & B directions today 10xs each way              TB BUE           flex          Abd          scaption          IR          ER          scap retraction          B Ext     02/12- FF red 2x10   02/12- Abd yellow 2x10   02/12- scaption yellow 2x10   03/23- IR BUE ^blue 2x10   03/23- ER BUE ^blue 2x10   03/23- Scap retr blue 2x10   03/23- B Ext ^blue 2x10     TB  tricep          Lat pull downs          B horiz abd          Diagonals  03/23-tricep blue 2x10   03/23-lat pull downs blue 2x10   03/23- B horiz abd red 2x10   03/23- PNF Diagonals w/red 2x10 B directions BUE           DB prone rows                   Ext                   horiz abd  03/16-added rows today BUE w/2#2x10   03/16-added ext today BUE w/1#2x10   03/16-added horiz abd BUE w/1#2x10       DB supine punch ups                       Triceps                       FF  03/23-punch ups 3#2x10   03/23-tricep 2#1x10      DB side lying IR                          ER      sleeper S           RA ROM BUE  03/04-RA AROM BUE-see flow sheet      RA str BUE      PSS  (IE 67/100)  03/04- PSS ^to 77/100    MODALITIES TOTAL TIME FOR SESSION 08-22 Minutes     MH B shoulder  03/23- MH B shoulders x 8 min      ice B shoulder  03/23- ice B shoulders x 5 min                    THER ACT TOTAL TIME FOR SESSION  Not Performed

## 2021-03-26 ENCOUNTER — HOSPITAL ENCOUNTER (OUTPATIENT)
Dept: OCCUPATIONAL THERAPY | Age: 71
Setting detail: THERAPIES SERIES
Discharge: HOME | End: 2021-03-26
Attending: NURSE PRACTITIONER
Payer: MEDICARE

## 2021-03-26 DIAGNOSIS — M75.52 BILATERAL SHOULDER BURSITIS: Primary | ICD-10-CM

## 2021-03-26 DIAGNOSIS — M75.51 BILATERAL SHOULDER BURSITIS: Primary | ICD-10-CM

## 2021-03-26 PROCEDURE — 97010 HOT OR COLD PACKS THERAPY: CPT | Mod: GO

## 2021-03-26 PROCEDURE — 97110 THERAPEUTIC EXERCISES: CPT | Mod: GO

## 2021-03-26 NOTE — OP OT TREATMENT LOG
SHOULDER OT FLOW SHEET    EXERCISES CURRENT SESSION TIME   SELF-CARE TOTAL TIME FOR SESSION Not Performed    education/proper positioning  02/28-discussed proper sleeping/sitting positions w/use of pillows & gave HO                   THER EX TOTAL TIME FOR SESSION 38-52 MInutes    PROM RUE/BUE  03/26-PROM B shoulder      ER/scap retraction S  03/26-ER/scap retraction S 3x15 sec H     UBE  03/23-L 3, 3 min F, 3 min B     pulleys      dowel stretches BUE                                    FF                          Abd                           Ext                          ER    03/26- FF 3x15 sec H   03/26- Abd 3x 5 sec H   03/26- Ext 3x15 sec H     ER doorway  03/26- ER doorway S  BUE 3x15 sec H     pendulum      IR Stretch  03/26- IR strap S 3x15 sec H     Post cap S  03/26- post cap S B UE 3x15 sec H      Inferior cap S  03/26- inferior cap S BUE 3x15 sec H     pect S 90/90  03/26- pect S 90/90  3x15 sec H    Bicep/chest S  03/26- bicep/chest S 3x15 sec H     wrist flexor stretch  03/26-added wrist flexor S for L in sup          Lat flex      Lev scap           DB exercises BUE   FF   scaption   Abd   Bicep curls   Forearm    press up   [place] press    03/26- FF R 3#2x10, L 3#2x10   03/26-scaption R 3# 2x10, L 3#2x10   03/26-Abd R 3# 2x10, L 3#2x10   03/26-added bicep curls today w/4#2x10     03/26- press up 3# 2x10   03/26-Armenian press 2# ^2x10    Ball on wall ex  Punches  Circles, reverse  Alphabet writing  02/17-added ball on wall ex today BUE   02/17-added punches BUE 10xs   02/17-added circles & reverse BUE 10xs      Wall push ups   table/plinth push ups   mat push ups      planks      MB exercises  03/23-2# MB ex     trampoline throw 20xs,   RUE single arm circles R B directions 10xs   LUE circles B directions 10xs   press up & out BUE 10xs     03/19-2# MB ex     trampoline throw 20xs   RUE single arm circles R B directions 10xs   LUE circles B directions 10xs   press up & out BUE 10xs    Ball pass behind back R>L, L>R 10xs each way   PNF diagonals BUE & B directions today 10xs each way              TB BUE           flex          Abd          scaption          IR          ER          scap retraction          B Ext     02/12- FF red 2x10   02/12- Abd yellow 2x10   02/12- scaption yellow 2x10   03/26- IR BUE blue 2x10   03/26- ER BUE blue 2x10   03/23- Scap retr blue 2x10   03/23- B Ext ^blue 2x10     TB  tricep          Lat pull downs          B horiz abd          Diagonals          tricep  03/26-tricep blue 2x10   03/26-lat pull downs blue 2x10   03/26- B horiz abd red 2x10   03/26- PNF Diagonals w/red 2x10 B directions BUE   03/26-tricep pull behind head BUE w/red 1x10         DB prone rows                   Ext                   horiz abd  03/16-added rows today BUE w/2#2x10   03/16-added ext today BUE w/1#2x10   03/16-added horiz abd BUE w/1#2x10       DB supine punch ups                       Triceps                       FF  03/23-punch ups 3#2x10   03/23-tricep 2#1x10      DB side lying IR                          ER      sleeper S           RA ROM BUE  03/04-RA AROM BUE-see flow sheet     RA str BUE      PSS  (IE 67/100)  03/04- PSS ^to 77/100    MODALITIES TOTAL TIME FOR SESSION 08-22 Minutes     MH B shoulder  03/26- MH B shoulders x 8 min      ice B shoulder  03/26- ice B shoulders x 5 min                    THER ACT TOTAL TIME FOR SESSION  Not Performed

## 2021-03-26 NOTE — PROGRESS NOTES
OT DAILY NOTE FOR OUTPATIENT THERAPY    Patient: Muriel Franklin   MRN: [id number]  : 1950 71 y.o.  Referring Physician: Isadora Rebolledo CR*  Date of Visit: 3/26/2021      Certification Dates: 21 through 21    Diagnosis:   1. Bilateral shoulder bursitis        Chief Complaints:   Dec ROM, Dec Strength, Pain, Self Care Difficulties, Decreased recreational/play activity    Precautions:  Existing Precautions/Restrictions: no known precautions/restrictions    TODAY'S VISIT    History/Vitals/Pain/Encounter Info - 21 1019        Injury History/Precautions/Daily Required Info    Document Type  daily treatment     Primary Therapist  Pinky Arnett, OTR/L     Chief Complaint/Reason for Visit   Dec ROM, Dec Strength, Pain, Self Care Difficulties, Decreased recreational/play activity     Referring Physician  Dr Potter     Existing Precautions/Restrictions  no known precautions/restrictions     Pertinent History of Current Functional Problem  Pt reports pain L upper arm (bicep/tricep/mid delt area) on & off for ~1 year then ~9 months ago, intermittent pain started in R shoulder/upper arm which has become constant w/in the past 6 months or so, pt notes R shoulder pain>L shoulder but L upper arm pain>R upper arm pain, pt was going to gym 3x week & due to COVID has been doing work out on [employer] w/, she was using 10#DB over head & has decreased weight to 4# or no weight but is still having pain, also notes she started Yoga a few weeks ago, she had 6 month f-u appt w/PCP on 21, discussed shoulder issues, XRays were ordered which reveaed (-)fx or dislocation but moderate DJD B shoulders & pt was referred to OT, pt notes resting pain  2/10, w/use 3/10, ^to 6-7/10 at worst, asael when lifting overhead pending weight & w/sleeping     Start Time  1002     Stop Time  1107     Time Calculation (min)  65 min     Patient reported fall since last visit  No        Pain Assessment    Currently  in pain  Yes     Preferred Pain Scale  number (Numeric Rating Pain Scale)     Pain: Body location  Shoulder     Pain Rating (0-10): Pre Activity  1    1/10 top of R shoulder, & L upper arm    Pain Rating (0-10): Post Activity  1        Pain Interventions    Intervention   MH B shoulders for warm up, TE, ice     Post Intervention Comments  no increase in pain         Daily Treatment Assessment and Plan - 03/26/21 1130        Daily Treatment Assessment and Plan    Progress toward goals  Progressing     Daily Outcome Summary  Added bicep curls today w/4#DB which she tolerated well, pt was able to ^reps w/tricep Indonesian press by 10, LUE lag noted w/this exercise & vc's required for proper form, had pt work in front of mirror for feed back which did help improve her form, added wrist stretches LUIDRIS w/good tolerance, pt often requires cues during session to maintain proper form w/various exercises.  She has tendency to compromise her form as she fatigues.     Plan and Recommendations  con't w/POC, progressing strength as nneka           Today's Treatment:      SHOULDER OT FLOW SHEET    EXERCISES CURRENT SESSION TIME   SELF-CARE TOTAL TIME FOR SESSION Not Performed    education/proper positioning  02/28-discussed proper sleeping/sitting positions w/use of pillows & gave HO                   THER EX TOTAL TIME FOR SESSION 38-52 MInutes    PROM RUE/BUE  03/26-PROM B shoulder      ER/scap retraction S  03/26-ER/scap retraction S 3x15 sec H     UBE  03/23-L 3, 3 min F, 3 min B     pulleys      dowel stretches BUE                                    FF                          Abd                           Ext                          ER    03/26- FF 3x15 sec H   03/26- Abd 3x 5 sec H   03/26- Ext 3x15 sec H     ER doorway  03/26- ER doorway S  BUE 3x15 sec H     pendulum      IR Stretch  03/26- IR strap S 3x15 sec H     Post cap S  03/26- post cap S B UE 3x15 sec H      Inferior cap S  03/26- inferior cap S BUE 3x15 sec H     pect  S 90/90  03/26- pect S 90/90  3x15 sec H    Bicep/chest S  03/26- bicep/chest S 3x15 sec H     wrist flexor stretch  03/26-added wrist flexor S for L in sup          Lat flex      Lev scap           DB exercises BUE   FF   scaption   Abd   Bicep curls   Forearm    press up   [place]ongolian press    03/26- FF R 3#2x10, L 3#2x10   03/26-scaption R 3# 2x10, L 3#2x10   03/26-Abd R 3# 2x10, L 3#2x10   03/26-added bicep curls today w/4#2x10     03/26- press up 3# 2x10   03/26-[place]z press 2# ^2x10    Ball on wall ex  Punches  Circles, reverse  Alphabet writing  02/17-added ball on wall ex today BUE   02/17-added punches BUE 10xs   02/17-added circles & reverse BUE 10xs      Wall push ups   table/plinth push ups   mat push ups      planks      MB exercises  03/23-2# MB ex     trampoline throw 20xs,   RUE single arm circles R B directions 10xs   LUE circles B directions 10xs   press up & out BUE 10xs     03/19-2# MB ex     trampoline throw 20xs   RUE single arm circles R B directions 10xs   LUE circles B directions 10xs   press up & out BUE 10xs   Ball pass behind back R>L, L>R 10xs each way   PNF diagonals BUE & B directions today 10xs each way              TB BUE           flex          Abd          scaption          IR          ER          scap retraction          B Ext     02/12- FF red 2x10   02/12- Abd yellow 2x10   02/12- scaption yellow 2x10   03/26- IR BUE blue 2x10   03/26- ER BUE blue 2x10   03/23- Scap retr blue 2x10   03/23- B Ext ^blue 2x10     TB  tricep          Lat pull downs          B horiz abd          Diagonals          tricep  03/26-tricep blue 2x10   03/26-lat pull downs blue 2x10   03/26- B horiz abd red 2x10   03/26- PNF Diagonals w/red 2x10 B directions BUE   03/26-tricep pull behind head BUE w/red 1x10         DB prone rows                   Ext                   horiz abd  03/16-added rows today BUE w/2#2x10   03/16-added ext today BUE w/1#2x10   03/16-added horiz abd BUE w/1#2x10        DB supine punch ups                       Triceps                       FF  03/23-punch ups 3#2x10   03/23-tricep 2#1x10      DB side lying IR                          ER      sleeper S           RA ROM BUE  03/04-RA AROM BUE-see flow sheet     RA str BUE      PSS  (IE 67/100)  03/04- PSS ^to 77/100    MODALITIES TOTAL TIME FOR SESSION 08-22 Minutes     MH B shoulder  03/26- MH B shoulders x 8 min      ice B shoulder  03/26- ice B shoulders x 5 min                    THER ACT TOTAL TIME FOR SESSION  Not Performed

## 2021-03-30 ENCOUNTER — HOSPITAL ENCOUNTER (OUTPATIENT)
Dept: OCCUPATIONAL THERAPY | Age: 71
Setting detail: THERAPIES SERIES
Discharge: HOME | End: 2021-03-30
Attending: NURSE PRACTITIONER
Payer: MEDICARE

## 2021-03-30 DIAGNOSIS — M75.51 BILATERAL SHOULDER BURSITIS: Primary | ICD-10-CM

## 2021-03-30 DIAGNOSIS — M75.52 BILATERAL SHOULDER BURSITIS: Primary | ICD-10-CM

## 2021-03-30 PROCEDURE — 97110 THERAPEUTIC EXERCISES: CPT | Mod: GO

## 2021-03-30 PROCEDURE — 97010 HOT OR COLD PACKS THERAPY: CPT | Mod: GO

## 2021-03-30 NOTE — OP OT TREATMENT LOG
SHOULDER OT FLOW SHEET    EXERCISES CURRENT SESSION TIME   SELF-CARE TOTAL TIME FOR SESSION Not Performed    education/proper positioning  02/28-discussed proper sleeping/sitting positions w/use of pillows & gave HO                   THER EX TOTAL TIME FOR SESSION 38-52 MInutes    PROM RUE/BUE  03/30-PROM B shoulder      ER/scap retraction S  03/30-ER/scap retraction S 3x15 sec H     UBE  03/23-L 3, 3 min F, 3 min B     pulleys      dowel stretches BUE                                    FF                          Abd                           Ext                          ER    03/30- FF 3x15 sec H   03/30- Abd 3x 5 sec H   03/30- Ext 3x15 sec H     ER doorway  03/30- ER doorway S  BUE 3x15 sec H     pendulum      IR Stretch  03/30- IR strap S 3x15 sec H     Post cap S  03/30- post cap S B UE 3x15 sec H      Inferior cap S  03/30- inferior cap S BUE 3x15 sec H     pect S 90/90  03/30- pect S 90/90  3x15 sec H    Bicep/chest S  03/26- bicep/chest S 3x15 sec H     wrist flexor stretch  03/26-added wrist flexor S for L in sup          Lat flex      Lev scap           DB exercises BUE   FF   scaption   Abd   Bicep curls   Forearm    press up   [place]an press    03/30- FF R 3#2x10, L 3#2x10   03/30-scaption R 3# 2x10, L 3#2x10   03/30-Abd R 3# 2x10, L 3#2x10   03/30-bicep curls 4#2x10     03/30- press up 3# 2x10   03/30-[place]vian press 2# 2x10    Ball on wall ex  Punches  Circles, reverse  Alphabet writing  02/17-added ball on wall ex today BUE   02/17-added punches BUE 10xs   02/17-added circles & reverse BUE 10xs      Wall push ups   table/plinth push ups   mat push ups  03/30- added wall push ups today 10xs   03/30- added plinth push ups today 2x10          Cable column  03/30- added rows today w/2 pl 2x10          MB exercises   03/30 2#PNF diagonals BUE & B directions 10xs each way,      2# RUE single arm circles R B directions 10xs      2# LUE circles B directions 10xs      ^4#  press up & out BUE 10xs      ^4#Ball pass behind back R>L, L>R 10xs each way     ^4# trampoline throw 20xs                TB BUE           flex          Abd          scaption          IR          ER          scap retraction          B Ext     02/12- FF red 2x10   02/12- Abd yellow 2x10   02/12- scaption yellow 2x10   03/30- IR BUE blue 2x10   03/30- ER BUE blue 2x10   03/23- Scap retr blue 2x10   03/30- B Ext blue 2x10     TB  tricep          Lat pull downs          B horiz abd          Diagonals          tricep  03/26-tricep blue 2x10   03/26-lat pull downs blue 2x10   03/26- B horiz abd red 2x10   03/26- PNF Diagonals w/red 2x10 B directions BUE   03/26-tricep pull behind head BUE w/red 1x10         DB prone rows                   Ext                   horiz abd  03/16-added rows today BUE w/2#2x10   03/16-added ext today BUE w/1#2x10   03/16-added horiz abd BUE w/1#2x10       DB supine punch ups                       Triceps                       FF  03/23-punch ups 3#2x10   03/23-tricep 2#1x10      DB side lying IR                          ER      sleeper S           RA ROM BUE  03/04-RA AROM BUE-see flow sheet     RA str BUE      PSS  (IE 67/100)  03/04- PSS ^to 77/100    MODALITIES TOTAL TIME FOR SESSION 08-22 Minutes     MH B shoulder  03/30- MH B shoulders x 8 min      ice B shoulder  03/30- ice B shoulders x 5 min                    THER ACT TOTAL TIME FOR SESSION  Not Performed

## 2021-03-31 NOTE — PROGRESS NOTES
OT DAILY NOTE FOR OUTPATIENT THERAPY    Patient: Muriel Franklin   MRN: [id number]  : 1950 71 y.o.  Referring Physician: Isadora Rebolledo CR*  Date of Visit: 3/30/2021      Certification Dates: 21 through 21    Diagnosis:   1. Bilateral shoulder bursitis        Chief Complaints:   Dec ROM, Dec Strength, Pain, Self Care Difficulties, Decreased recreational/play activity    Precautions:  Existing Precautions/Restrictions: no known precautions/restrictions    TODAY'S VISIT    History/Vitals/Pain/Encounter Info - 21 1313        Injury History/Precautions/Daily Required Info    Document Type  daily treatment     Primary Therapist  Pinky Arnett, OTR/L     Chief Complaint/Reason for Visit   Dec ROM, Dec Strength, Pain, Self Care Difficulties, Decreased recreational/play activity     Referring Physician  Dr Potter     Existing Precautions/Restrictions  no known precautions/restrictions     Pertinent History of Current Functional Problem  Pt reports pain L upper arm (bicep/tricep/mid delt area) on & off for ~1 year then ~9 months ago, intermittent pain started in R shoulder/upper arm which has become constant w/in the past 6 months or so, pt notes R shoulder pain>L shoulder but L upper arm pain>R upper arm pain, pt was going to gym 3x week & due to COVID has been doing work out on [employer] w/, she was using 10#DB over head & has decreased weight to 4# or no weight but is still having pain, also notes she started Yoga a few weeks ago, she had 6 month f-u appt w/PCP on 21, discussed shoulder issues, XRays were ordered which reveaed (-)fx or dislocation but moderate DJD B shoulders & pt was referred to OT, pt notes resting pain  2/10, w/use 3/10, ^to 6-7/10 at worst, asael when lifting overhead pending weight & w/sleeping     Start Time  1303     Stop Time  1404     Time Calculation (min)  61 min     Patient reported fall since last visit  No        Pain Assessment    Currently  in pain  Yes     Preferred Pain Scale  number (Numeric Rating Pain Scale)     Pain: Body location  Shoulder     Pain Rating (0-10): Pre Activity  --    1/2/10 B shoulders    Pain Rating (0-10): Post Activity  --    1/2-1/10 B shoulders       Pain Interventions    Intervention   MH B shoulders, TE, ice     Post Intervention Comments  slight increase in pain         Daily Treatment Assessment and Plan - 03/30/21 1350        Daily Treatment Assessment and Plan    Progress toward goals  Progressing     Daily Outcome Summary  Continued to challenge BUE strength.  Nice progression of program. Added rows on cable column which she tolerated well, also added wall & plinth push ups which was a good challenge for her, tactile cueing required to maintain proper form w/push ups.  She was able to ^weight w/MB by 2# to 4# for 3 exercises w/o ^in pain.     Plan and Recommendations  con't w/POC, progressing strength as nneka         Today's Treatment:      SHOULDER OT FLOW SHEET    EXERCISES CURRENT SESSION TIME   SELF-CARE TOTAL TIME FOR SESSION Not Performed    education/proper positioning  02/28-discussed proper sleeping/sitting positions w/use of pillows & gave HO                   THER EX TOTAL TIME FOR SESSION 38-52 MInutes    PROM RUE/BUE  03/30-PROM B shoulder      ER/scap retraction S  03/30-ER/scap retraction S 3x15 sec H     UBE  03/23-L 3, 3 min F, 3 min B     pulleys      dowel stretches BUE                                    FF                          Abd                           Ext                          ER    03/30- FF 3x15 sec H   03/30- Abd 3x 5 sec H   03/30- Ext 3x15 sec H     ER doorway  03/30- ER doorway S  BUE 3x15 sec H     pendulum      IR Stretch  03/30- IR strap S 3x15 sec H     Post cap S  03/30- post cap S B UE 3x15 sec H      Inferior cap S  03/30- inferior cap S BUE 3x15 sec H     pect S 90/90  03/30- pect S 90/90  3x15 sec H    Bicep/chest S  03/26- bicep/chest S 3x15 sec H     wrist flexor stretch   03/26-added wrist flexor S for L in sup          Lat flex      Lev scap           DB exercises BUE   FF   scaption   Abd   Bicep curls   Forearm    press up   [place]ian press    03/30- FF R 3#2x10, L 3#2x10   03/30-scaption R 3# 2x10, L 3#2x10   03/30-Abd R 3# 2x10, L 3#2x10   03/30-bicep curls 4#2x10     03/30- press up 3# 2x10   03/30-[place]yan press 2# 2x10    Ball on wall ex  Punches  Circles, reverse  Alphabet writing  02/17-added ball on wall ex today BUE   02/17-added punches BUE 10xs   02/17-added circles & reverse BUE 10xs      Wall push ups   table/plinth push ups   mat push ups  03/30- added wall push ups today 10xs   03/30- added plinth push ups today 2x10          Cable column  03/30- added rows today w/2 pl 2x10          MB exercises   03/30 2#PNF diagonals BUE & B directions 10xs each way,      2# RUE single arm circles R B directions 10xs      2# LUE circles B directions 10xs      ^4#  press up & out BUE 10xs     ^4#Ball pass behind back R>L, L>R 10xs each way     ^4# trampoline throw 20xs                TB BUE           flex          Abd          scaption          IR          ER          scap retraction          B Ext     02/12- FF red 2x10   02/12- Abd yellow 2x10   02/12- scaption yellow 2x10   03/30- IR BUE blue 2x10   03/30- ER BUE blue 2x10   03/23- Scap retr blue 2x10   03/30- B Ext blue 2x10     TB  tricep          Lat pull downs          B horiz abd          Diagonals          tricep  03/26-tricep blue 2x10   03/26-lat pull downs blue 2x10   03/26- B horiz abd red 2x10   03/26- PNF Diagonals w/red 2x10 B directions BUE   03/26-tricep pull behind head BUE w/red 1x10         DB prone rows                   Ext                   horiz abd  03/16-added rows today BUE w/2#2x10   03/16-added ext today BUE w/1#2x10   03/16-added horiz abd BUE w/1#2x10       DB supine punch ups                       Triceps                       FF  03/23-punch ups 3#2x10   03/23-tricep 2#1x10      DB  side lying IR                          ER      sleeper S           RA ROM BUE  03/04-RA AROM BUE-see flow sheet     RA str BUE      PSS  (IE 67/100)  03/04- PSS ^to 77/100    MODALITIES TOTAL TIME FOR SESSION 08-22 Minutes     MH B shoulder  03/30- MH B shoulders x 8 min      ice B shoulder  03/30- ice B shoulders x 5 min                    THER ACT TOTAL TIME FOR SESSION  Not Performed

## 2021-04-01 ENCOUNTER — HOSPITAL ENCOUNTER (OUTPATIENT)
Dept: OCCUPATIONAL THERAPY | Age: 71
Setting detail: THERAPIES SERIES
Discharge: HOME | End: 2021-04-01
Attending: NURSE PRACTITIONER
Payer: MEDICARE

## 2021-04-01 DIAGNOSIS — M75.51 BILATERAL SHOULDER BURSITIS: Primary | ICD-10-CM

## 2021-04-01 DIAGNOSIS — M75.52 BILATERAL SHOULDER BURSITIS: Primary | ICD-10-CM

## 2021-04-01 PROCEDURE — 97110 THERAPEUTIC EXERCISES: CPT | Mod: GO

## 2021-04-01 PROCEDURE — 97010 HOT OR COLD PACKS THERAPY: CPT | Mod: GO

## 2021-04-01 NOTE — PROGRESS NOTES
OT PROGRESS NOTE FOR OUTPATIENT THERAPY    Patient: uMriel Franklin   MRN: [id number]  : 1950 71 y.o.  Referring Physician: Isadora Rebolledo CR*  Date of Visit: 2021      Certification Dates:   21 through 21    Recommended Frequency & Duration:  2 times/week for up to 3 months        Diagnosis:   1. Bilateral shoulder bursitis        Chief Complaints:  Chief Complaint   Patient presents with   • Dec ROM   • Dec Strength   • Pain   • Self Care Difficulties   • Decreased recreational/play activity       Precautions:  Existing Precautions/Restrictions: no known precautions/restrictions    TODAY'S VISIT:    General Information - 21 1516        General Information    Document Type  progress note     Referring Physician  Dr Potter     Pertinent History of Current Functional Problem  Pt reports pain L upper arm (bicep/tricep/mid delt area) on & off for ~1 year then ~9 months ago, intermittent pain started in R shoulder/upper arm which has become constant w/in the past 6 months or so, pt notes R shoulder pain>L shoulder but L upper arm pain>R upper arm pain, pt was going to gym 3x week & due to COVID has been doing work out on [employer] w/, she was using 10#DB over head & has decreased weight to 4# or no weight but is still having pain, also notes she started Yoga a few weeks ago, she had 6 month f-u appt w/PCP on 21, discussed shoulder issues, XRays were ordered which reveaed (-)fx or dislocation but moderate DJD B shoulders & pt was referred to OT, pt notes resting pain  2/10, w/use 3/10, ^to 6-7/10 at worst, asael when lifting overhead pending weight & w/sleeping     Existing Precautions/Restrictions  no known precautions/restrictions        Time Calculation    Start Time  1500     Stop Time  1605     Time Calculation (min)  65 min         Pain/Vitals - 21 1515        Pain Assessment    Currently in pain  Yes     Preferred Pain Scale  number (Numeric Rating Pain  Scale)     Pain: Body location  Shoulder     Pain Rating (0-10): Pre Activity  --    1/2/10 B shoulders    Pain Rating (0-10): Post Activity  0        Pain Interventions    Intervention   MH B shoulders, TE, ice     Post Intervention Comments  no pain         Daily Falls Screen - 04/01/21 1517        Daily Falls Assessment    Patient reported fall since last visit  No         Daily Treatment Assessment and Plan - 04/01/21 1538        Daily Treatment Assessment and Plan    Progress toward goals  Progressing     Daily Outcome Summary  Progress note completed today. PSS ^to 85/100, improving 18 points since IE.  RA ROM/Strength BUE w/continued gains in all planes. Pt reports pain is decreasing w/less pain during BALDs/IADLs.  Today, pt was able to increase weight w/rows on cable column by 1 plate w/good tolerance. Pt is making steady progress towards LTGs, meeting 5 STGs & 1 partially met.  She will benefit from continuing OT for stated goals     Plan and Recommendations  con't w/POC, progressing strength as nneka, see goals           OBJECTIVE MEASUREMENTS/DATA:    Range of Motion    PROM/AROM - 04/01/21 1500        RIGHT: Upper Extremity AROM Assessment    Shoulder Flexion Deficit  155 degrees     Shoulder Abduction Deficit  160 degrees     Shoulder Internal Rotation Deficit  --    WNL to bra line, no pain    Shoulder External Rotation Deficit  --    WNL       LEFT: Upper Extremity AROM Assessment    Shoulder Flexion Deficit  165 degrees     Shoulder Abduction Deficit  165 degrees    pain 2/10 @EROM    Shoulder Internal Rotation Deficit  --    WFL to bra line, no pain    Shoulder External Rotation Deficit  --    WNL        MMT    Manual Muscle Tests - 04/01/21 1540        RIGHT: Upper Extremity Manual Muscle Test Assessment    Shoulder Flexion gross movement  (4/5) good     Shoulder Abduction gross movement  (4/5) good     Shoulder Internal Rotation gross movement  --    4<>4+/5    Shoulder External Rotation gross  movement  (4/5) good     Elbow Flex gross movement  (5/5) normal     Elbow Extension gross movement  (5/5) normal     Forearm Supination gross movement  (4+/5) good plus     Forearm Pronation gross movement  (4+/5) good plus     Wrist Flexion gross movement  (4/5) good     Wrist Extension gross movement  (4+/5) good plus        LEFT: Upper Extremity Manual Muscle Test Assessment    Shoulder Flexion gross movement  (3+/5) fair plus     Shoulder Abduction gross movement  --    4-<>4/5    Shoulder Internal Rotation gross movement  (4/5) good     Shoulder External Rotation gross movement  (4-/5) good minus     Elbow Flexion gross movement  (4/5) good     Elbow Extension gross movement  (4-/5) good minus     Forearm Supination gross movement  (4+/5) good plus     Forearm Pronation gross movement  (4+/5) good plus     Wrist Flexion gross movement  (4/5) good     Wrist Extension gross movement  (4+/5) good plus         BADL/IADL   BADL/IADL - 04/01/21 1517        BADL Interventions Assessment    Upper Body Dressing  Independent     Upper body dressing comments  still having min pain w/donning coat, now no pain (^from min at times) donning bra     Lower Body Dressing  Independent     Lower body dressing comments  no pain (^from min) pulling up pants     Bathing  Independent     Bathing comments  no pain washing shoulders & now no pain (^from min pain at times) washing back     Toileting  Independent     Toileting comments  no pain     Grooming comments  no pain now (^from min) holding hair dryer     Eating comments  no issues        IADL/Home Interventions Assessment    Driving and community mobility comments  no pain driving     Home management/maintenance comments  no pain now w/housework, but still having pain w/over head lifting, able to carry light grocery bags     Meal prep / clean up comments  no pain reaching into cabinet for pot, but does need  2 hands, no pain lifting 1/2 gal, min pain lifting a gallon     Other  (comments)  PSS ^18 points to 85/100 (^from 67/100 on IE), pt reports no pain now at rest (^from 2/10), w/use still 1-2 1/2/10, at worst ^to 3/10 (^from 6-7/10)           Today's Treatment::      SHOULDER OT FLOW SHEET    EXERCISES CURRENT SESSION TIME   SELF-CARE TOTAL TIME FOR SESSION Not Performed    education/proper positioning  02/28-discussed proper sleeping/sitting positions w/use of pillows & gave HO                   THER EX TOTAL TIME FOR SESSION 38-52 MInutes    PROM RUE/BUE  03/30-PROM B shoulder      ER/scap retraction S  03/30-ER/scap retraction S 3x15 sec H     UBE  03/23-L 3, 3 min F, 3 min B     pulleys      dowel stretches BUE                                    FF                          Abd                           Ext                          ER    04/01- FF 3x15 sec H   04/01- Abd 3x 5 sec H   04/01- Ext 3x15 sec H     ER doorway  04/01- ER doorway S  BUE 3x15 sec H     pendulum      IR Stretch  04/01- IR strap S 3x15 sec H     Post cap S  04/01- post cap S B UE 3x15 sec H      Inferior cap S  04/01- inferior cap S BUE 3x15 sec H     pect S 90/90  04/01- pect S 90/90  3x15 sec H    Bicep/chest S  03/26- bicep/chest S 3x15 sec H     wrist flexor stretch  03/26-added wrist flexor S for L in sup          Lat flex      Lev scap           DB exercises BUE   FF   scaption   Abd   Bicep curls   Forearm    press up   [place]ian press    04/01- FF R 3#2x10, L 3#2x10   04/01-scaption R 3# 2x10, L 3#2x10   04/01-Abd R 3# 2x10, L 3#2x10   04/01-bicep curls 4#2x10     04/01- press up 3# 2x10   03/30-[place] press 2# 2x10    Ball on wall ex  Punches  Circles, reverse  Alphabet writing  02/17-added ball on wall ex today BUE   02/17-added punches BUE 10xs   02/17-added circles & reverse BUE 10xs      Wall push ups   table/plinth push ups   mat push ups  03/30- added wall push ups today 10xs   04/01-plinth push ups 2x10          Cable column  04/01- rows ^3 pl 2x10          MB exercises   03/30  2#PNF diagonals BUE & B directions 10xs each way,      2# RUE single arm circles R B directions 10xs      2# LUE circles B directions 10xs      ^4#  press up & out BUE 10xs     ^4#Ball pass behind back R>L, L>R 10xs each way     ^4# trampoline throw 20xs                TB BUE           flex          Abd          scaption          IR          ER          scap retraction          B Ext     02/12- FF red 2x10   02/12- Abd yellow 2x10   02/12- scaption yellow 2x10   04/01- IR BUE blue 2x10   04/01- ER BUE blue 2x10   03/23- Scap retr blue 2x10   03/30- B Ext blue 2x10     TB  tricep          Lat pull downs          B horiz abd          Diagonals          tricep  03/26-tricep blue 2x10   03/26-lat pull downs blue 2x10   04/01- B horiz abd red 2x10   04/01- PNF Diagonals w/red 2x10 B directions BUE   04/01-tricep pull behind head BUE w/red ^2x10         DB prone rows                   Ext                   horiz abd  03/16-added rows today BUE w/2#2x10   03/16-added ext today BUE w/1#2x10   03/16-added horiz abd BUE w/1#2x10       DB supine punch ups                       Triceps                       FF  03/23-punch ups 3#2x10   03/23-tricep 2#1x10      DB side lying IR                          ER      sleeper S           RA ROM BUE  04/01-RA AROM BUE-see flow sheet     RA str BUE  04/01-RA BUE str-see flow sheet     PSS  (IE 67/100)  04/01- PSS ^to 85/100    MODALITIES TOTAL TIME FOR SESSION 08-22 Minutes     MH B shoulder  04/01- MH B shoulders x 8 min      ice B shoulder  04/01- ice B shoulders x 5 min                    THER ACT TOTAL TIME FOR SESSION  Not Performed                                  Goals Addressed                 This Visit's Progress    • OT STGs/LTGs          Short Term Goals Time   Frame Result Comment/Progress   Pt will be Supervision with HEP 2 weeks 04/01-met    Increase P/AROM of RUE by 10 degrees to increase independence and ease with overhead ADLs 6 weeks 03/04-partially met 03/04-AROM-met  in all planes  PROM-not assessed this date   Increase strength BUE by ½ grade to increase ease with ADLs, household activities and  recreational tasks 6 weeks 04/01-partially  met 04/01-RUE-met in all planes except wrist flex  LUE- met in all planes except elbow extension   Increase functional use of BUE to WFL to increase independence and ease with ADLs, household activities and recreational tasks 6 weeks 04/01-met    Decrease pain to 2/10 with use and 5/10 at worst 6 weeks 04/01- met    Decrease PSS to 72%  (IE 67/100) 6 weeks 04/01-met 04/01-met at 85/100     Long Term Goals Time  Frame Result Comment/Progress   Pt will be independent with HEP 12 weeks     Patient will increase P/AROM BUE to WNL to increase independence and ease with overhead ADLs 12 weeks 04/01-  ongoing 04/01-improving in all planes   Increase ability to lift 8lbs with RUE to lift gallon of milk and pour coffee pain free 12 weeks     Increase strength RUE to 5/5 & LUE to 4+/5 to increase independence and ease with ADLs, household activities and  recreational tasks 12 weeks 04/01-  partially met 04/01-RUE-met w/elbow flex/ext   Increase functional use of BUE to WNL pain free 12 weeks     No disability on PSS  12 weeks       Pt will be able to perform work outs including planks pain free 12 weeks     Decrease pain in BUE to 0/10 12 weeks                                                         Clinical Impression: Pt continues to make steady progress towards LTGs, meeting 5 STGs & 1 partially met.  She will benefit from continuing OT for stated goals.  See objective data, daily outcome statement & goals above for further status.

## 2021-04-01 NOTE — OP OT TREATMENT LOG
SHOULDER OT FLOW SHEET    EXERCISES CURRENT SESSION TIME   SELF-CARE TOTAL TIME FOR SESSION Not Performed    education/proper positioning  02/28-discussed proper sleeping/sitting positions w/use of pillows & gave HO                   THER EX TOTAL TIME FOR SESSION 38-52 MInutes    PROM RUE/BUE  03/30-PROM B shoulder      ER/scap retraction S  03/30-ER/scap retraction S 3x15 sec H     UBE  03/23-L 3, 3 min F, 3 min B     pulleys      dowel stretches BUE                                    FF                          Abd                           Ext                          ER    04/01- FF 3x15 sec H   04/01- Abd 3x 5 sec H   04/01- Ext 3x15 sec H     ER doorway  04/01- ER doorway S  BUE 3x15 sec H     pendulum      IR Stretch  04/01- IR strap S 3x15 sec H     Post cap S  04/01- post cap S B UE 3x15 sec H      Inferior cap S  04/01- inferior cap S BUE 3x15 sec H     pect S 90/90  04/01- pect S 90/90  3x15 sec H    Bicep/chest S  03/26- bicep/chest S 3x15 sec H     wrist flexor stretch  03/26-added wrist flexor S for L in sup          Lat flex      Lev scap           DB exercises BUE   FF   scaption   Abd   Bicep curls   Forearm    press up   [place]izen [place] press    04/01- FF R 3#2x10, L 3#2x10   04/01-scaption R 3# 2x10, L 3#2x10   04/01-Abd R 3# 2x10, L 3#2x10   04/01-bicep curls 4#2x10     04/01- press up 3# 2x10   03/30-[place]haric press 2# 2x10    Ball on wall ex  Punches  Circles, reverse  Alphabet writing  02/17-added ball on wall ex today BUE   02/17-added punches BUE 10xs   02/17-added circles & reverse BUE 10xs      Wall push ups   table/plinth push ups   mat push ups  03/30- added wall push ups today 10xs   04/01-plinth push ups 2x10          Cable column  04/01- rows ^3 pl 2x10          MB exercises   03/30 2#PNF diagonals BUE & B directions 10xs each way,      2# RUE single arm circles R B directions 10xs      2# LUE circles B directions 10xs      ^4#  press up & out BUE 10xs     ^4#Ball pass behind  back R>L, L>R 10xs each way     ^4# trampoline throw 20xs                TB BUE           flex          Abd          scaption          IR          ER          scap retraction          B Ext     02/12- FF red 2x10   02/12- Abd yellow 2x10   02/12- scaption yellow 2x10   04/01- IR BUE blue 2x10   04/01- ER BUE blue 2x10   03/23- Scap retr blue 2x10   03/30- B Ext blue 2x10     TB  tricep          Lat pull downs          B horiz abd          Diagonals          tricep  03/26-tricep blue 2x10   03/26-lat pull downs blue 2x10   04/01- B horiz abd red 2x10   04/01- PNF Diagonals w/red 2x10 B directions BUE   04/01-tricep pull behind head BUE w/red ^2x10         DB prone rows                   Ext                   horiz abd  03/16-added rows today BUE w/2#2x10   03/16-added ext today BUE w/1#2x10   03/16-added horiz abd BUE w/1#2x10       DB supine punch ups                       Triceps                       FF  03/23-punch ups 3#2x10   03/23-tricep 2#1x10      DB side lying IR                          ER      sleeper S           RA ROM BUE  04/01-RA AROM BUE-see flow sheet     RA str BUE  04/01-RA BUE str-see flow sheet     PSS  (IE 67/100)  04/01- PSS ^to 85/100    MODALITIES TOTAL TIME FOR SESSION 08-22 Minutes     MH B shoulder  04/01- MH B shoulders x 8 min      ice B shoulder  04/01- ice B shoulders x 5 min                    THER ACT TOTAL TIME FOR SESSION  Not Performed

## 2021-04-06 ENCOUNTER — HOSPITAL ENCOUNTER (OUTPATIENT)
Dept: OCCUPATIONAL THERAPY | Age: 71
Setting detail: THERAPIES SERIES
Discharge: HOME | End: 2021-04-06
Attending: NURSE PRACTITIONER
Payer: MEDICARE

## 2021-04-06 DIAGNOSIS — M75.52 BILATERAL SHOULDER BURSITIS: Primary | ICD-10-CM

## 2021-04-06 DIAGNOSIS — M75.51 BILATERAL SHOULDER BURSITIS: Primary | ICD-10-CM

## 2021-04-06 PROCEDURE — 97010 HOT OR COLD PACKS THERAPY: CPT | Mod: GO

## 2021-04-06 NOTE — OP OT TREATMENT LOG
SHOULDER OT FLOW SHEET    EXERCISES CURRENT SESSION TIME   SELF-CARE TOTAL TIME FOR SESSION Not Performed    education/proper positioning  02/28-discussed proper sleeping/sitting positions w/use of pillows & gave HO                   THER EX TOTAL TIME FOR SESSION 38-52 MInutes    PROM RUE/BUE  04/06-PROM B shoulder      ER/scap retraction S  04/06-ER/scap retraction S 3x15 sec H     UBE  03/23-L 3, 3 min F, 3 min B     pulleys      dowel stretches BUE                                    FF                          Abd                           Ext                          ER    04/06- FF 3x15 sec H   04/06- Abd 3x 5 sec H   04/06- Ext 3x15 sec H     ER doorway  04/06- ER doorway S  BUE 3x15 sec H     pendulum      IR Stretch  04/06- IR strap S 3x15 sec H     Post cap S  04/06- post cap S B UE 3x15 sec H      Inferior cap S  04/06- inferior cap S BUE 3x15 sec H     pect S 90/90  04/06- pect S 90/90  3x15 sec H    Bicep/chest S  03/26- bicep/chest S 3x15 sec H     wrist flexor stretch  03/26-added wrist flexor S for L in sup          Lat flex      Lev scap           DB exercises BUE   FF   scaption   Abd   Bicep curls   Forearm    press up   Russian press    04/01- FF R 3#2x10, L 3#2x10   04/06-scaption R 3# 2x10, L 3#2x10   04/01-Abd R 3# 2x10, L 3#2x10   04/06-bicep curls 4#2x10     04/01- press up 3# 2x10   03/30-[place] press 2# 2x10    Ball on wall ex  Punches  Circles, reverse  Alphabet writing  02/17-added ball on wall ex today BUE   02/17-added punches BUE 10xs   02/17-added circles & reverse BUE 10xs      Wall push ups   table/plinth push ups   mat push ups  03/30- added wall push ups today 10xs   04/06-plinth push ups 2x10          Cable column  04/01- rows ^3 pl 2x10          MB exercises    2#PNF diagonals BUE & B directions 10xs each way,      2# RUE single arm circles R B directions 10xs      2# LUE circles B directions 10xs    04/06      4#  press up & out BUE 10xs     4#Ball pass behind  back R>L, L>R 10xs each way     4# trampoline throw 20xs                TB BUE           flex          Abd          scaption          IR          ER          scap retraction          B Ext     02/12- FF red 2x10   02/12- Abd yellow 2x10   02/12- scaption yellow 2x10   04/06- IR BUE blue 2x10   04/06- ER BUE blue 2x10   04/06- Scap retr blue 2x10   04/06- B Ext blue 2x10     TB  tricep          Lat pull downs          B horiz abd          Diagonals          tricep  04/06-tricep blue 2x10   04/06-lat pull downs blue 2x10   04/06- B horiz abd red 2x10   04/06- PNF Diagonals w/red 2x10 B directions BUE   04/06 -tricep pull behind head BUE w/red 2x10         DB prone rows                   Ext                   horiz abd  03/16-added rows today BUE w/2#2x10   03/16-added ext today BUE w/1#2x10   03/16-added horiz abd BUE w/1#2x10       DB supine punch ups                       Triceps                       FF  04/06-punch ups 3#2x10   03/23-tricep 2#1x10      DB side lying IR                          ER      sleeper S           RA ROM BUE  04/01-RA AROM BUE-see flow sheet     RA str BUE  04/01-RA BUE str-see flow sheet     PSS  (IE 67/100)  04/01- PSS ^to 85/100    MODALITIES TOTAL TIME FOR SESSION 08-22 Minutes     MH B shoulder  04/06- MH B shoulders x 8 min      ice B shoulder  04/06- ice B shoulders x 5 min                    THER ACT TOTAL TIME FOR SESSION  Not Performed

## 2021-04-06 NOTE — PROGRESS NOTES
"OT DAILY NOTE FOR OUTPATIENT THERAPY    Patient: Muriel Franklin   MRN: [id number]  : 1950 71 y.o.  Referring Physician: Isadora Rebolledo CR*  Date of Visit: 2021      Certification Dates: 21 through 21    Diagnosis:   1. Bilateral shoulder bursitis        Chief Complaints:   Dec ROM, Dec Strength, Pain, Self Care Difficulties, Decreased recreational/play activity    Precautions:  Existing Precautions/Restrictions: no known precautions/restrictions    TODAY'S VISIT    History/Vitals/Pain/Encounter Info - 21 1014        Injury History/Precautions/Daily Required Info    Document Type  daily treatment     Primary Therapist  Pinky Arnett, OTR/L     Chief Complaint/Reason for Visit   Dec ROM, Dec Strength, Pain, Self Care Difficulties, Decreased recreational/play activity     Referring Physician  Dr Potter     Existing Precautions/Restrictions  no known precautions/restrictions     Pertinent History of Current Functional Problem  Pt reports pain L upper arm (bicep/tricep/mid delt area) on & off for ~1 year then ~9 months ago, intermittent pain started in R shoulder/upper arm which has become constant w/in the past 6 months or so, pt notes R shoulder pain>L shoulder but L upper arm pain>R upper arm pain, pt was going to gym 3x week & due to COVID has been doing work out on [employer] w/, she was using 10#DB over head & has decreased weight to 4# or no weight but is still having pain, also notes she started Yoga a few weeks ago, she had 6 month f-u appt w/PCP on 21, discussed shoulder issues, XRays were ordered which reveaed (-)fx or dislocation but moderate DJD B shoulders & pt was referred to OT, pt notes resting pain  2/10, w/use 3/10, ^to 6-7/10 at worst, asael when lifting overhead pending weight & w/sleeping     Patient/Family/Caregiver Comments/Observations  Pt notes \"I may have over done it\" this weekend, her bathroom was just remodeled & she was setting it up, " hanging curtains, pushing boxes of tiles & reported feeling sore the last 2 days, feeling better today     Start Time  1002     Stop Time  1104     Time Calculation (min)  62 min     Patient reported fall since last visit  No        Pain Assessment    Currently in pain  Yes     Preferred Pain Scale  number (Numeric Rating Pain Scale)     Pain: Body location  Shoulder     Pain Rating (0-10): Pre Activity  --    1/2/10 B shoulders    Pain Rating (0-10): Post Activity  --    1/2-1/10 B shoulders       Pain Interventions    Intervention    B shoulders, TE, ice     Post Intervention Comments  slight increase in pain         Daily Treatment Assessment and Plan - 04/06/21 1110        Daily Treatment Assessment and Plan    Progress toward goals  Progressing     Daily Outcome Summary  Pt entered gym reporting she “over did it” this weekend working in her bathroom that was just remodeled, notes she was sore the past 2 days but feeling better today. Proceeded cautiously today to avoid aggravating arms & no upgrades to program today. vc’s often required t/o session for proper technique w/various exercises, as she fatigues, she starts compromising her form. Had here work in front of mirror for some exercises for feed back which was beneficial.    Plan and Recommendations  con't w/POC, progressing strength as nneka           Today's Treatment:      SHOULDER OT FLOW SHEET    EXERCISES CURRENT SESSION TIME   SELF-CARE TOTAL TIME FOR SESSION Not Performed    education/proper positioning  02/28-discussed proper sleeping/sitting positions w/use of pillows & gave HO                   THER EX TOTAL TIME FOR SESSION 38-52 MInutes    PROM RUE/BUE  04/06-PROM B shoulder      ER/scap retraction S  04/06-ER/scap retraction S 3x15 sec H     UBE  03/23-L 3, 3 min F, 3 min B     pulleys      dowel stretches BUE                                    FF                          Abd                           Ext                          ER    04/06-  FF 3x15 sec H   04/06- Abd 3x 5 sec H   04/06- Ext 3x15 sec H     ER doorway  04/06- ER doorway S  BUE 3x15 sec H     pendulum      IR Stretch  04/06- IR strap S 3x15 sec H     Post cap S  04/06- post cap S B UE 3x15 sec H      Inferior cap S  04/06- inferior cap S BUE 3x15 sec H     pect S 90/90  04/06- pect S 90/90  3x15 sec H    Bicep/chest S  03/26- bicep/chest S 3x15 sec H     wrist flexor stretch  03/26-added wrist flexor S for L in sup          Lat flex      Lev scap           DB exercises BUE   FF   scaption   Abd   Bicep curls   Forearm    press up   Indian press    04/01- FF R 3#2x10, L 3#2x10   04/06-scaption R 3# 2x10, L 3#2x10   04/01-Abd R 3# 2x10, L 3#2x10   04/06-bicep curls 4#2x10     04/01- press up 3# 2x10   03/30-Urdu press 2# 2x10    Ball on wall ex  Punches  Circles, reverse  Alphabet writing  02/17-added ball on wall ex today BUE   02/17-added punches BUE 10xs   02/17-added circles & reverse BUE 10xs      Wall push ups   table/plinth push ups   mat push ups  03/30- added wall push ups today 10xs   04/06-plinth push ups 2x10          Cable column  04/01- rows ^3 pl 2x10          MB exercises    2#PNF diagonals BUE & B directions 10xs each way,      2# RUE single arm circles R B directions 10xs      2# LUE circles B directions 10xs    04/06      4#  press up & out BUE 10xs     4#Ball pass behind back R>L, L>R 10xs each way     4# trampoline throw 20xs                TB BUE           flex          Abd          scaption          IR          ER          scap retraction          B Ext     02/12- FF red 2x10   02/12- Abd yellow 2x10   02/12- scaption yellow 2x10   04/06- IR BUE blue 2x10   04/06- ER BUE blue 2x10   04/06- Scap retr blue 2x10   04/06- B Ext blue 2x10     TB  tricep          Lat pull downs          B horiz abd          Diagonals          tricep  04/06-tricep blue 2x10   04/06-lat pull downs blue 2x10   04/06- B horiz abd red 2x10   04/06- PNF Diagonals w/red 2x10 B  directions BUE   04/06 -tricep pull behind head BUE w/red 2x10         DB prone rows                   Ext                   horiz abd  03/16-added rows today BUE w/2#2x10   03/16-added ext today BUE w/1#2x10   03/16-added horiz abd BUE w/1#2x10       DB supine punch ups                       Triceps                       FF  04/06-punch ups 3#2x10   03/23-tricep 2#1x10      DB side lying IR                          ER      sleeper S           RA ROM BUE  04/01-RA AROM BUE-see flow sheet     RA str BUE  04/01-RA BUE str-see flow sheet     PSS  (IE 67/100)  04/01- PSS ^to 85/100    MODALITIES TOTAL TIME FOR SESSION 08-22 Minutes     MH B shoulder  04/06- MH B shoulders x 8 min      ice B shoulder  04/06- ice B shoulders x 5 min                    THER ACT TOTAL TIME FOR SESSION  Not Performed

## 2021-04-08 ENCOUNTER — HOSPITAL ENCOUNTER (OUTPATIENT)
Dept: OCCUPATIONAL THERAPY | Age: 71
Setting detail: THERAPIES SERIES
Discharge: HOME | End: 2021-04-08
Attending: NURSE PRACTITIONER
Payer: MEDICARE

## 2021-04-08 DIAGNOSIS — M75.51 BILATERAL SHOULDER BURSITIS: Primary | ICD-10-CM

## 2021-04-08 DIAGNOSIS — M75.52 BILATERAL SHOULDER BURSITIS: Primary | ICD-10-CM

## 2021-04-08 PROCEDURE — 97010 HOT OR COLD PACKS THERAPY: CPT | Mod: GO

## 2021-04-08 PROCEDURE — 97110 THERAPEUTIC EXERCISES: CPT | Mod: GO

## 2021-04-08 NOTE — OP OT TREATMENT LOG
SHOULDER OT FLOW SHEET    EXERCISES CURRENT SESSION TIME   SELF-CARE TOTAL TIME FOR SESSION Not Performed    education/proper positioning  02/28-discussed proper sleeping/sitting positions w/use of pillows & gave HO                   THER EX TOTAL TIME FOR SESSION 38-52 MInutes    PROM RUE/BUE  04/08-PROM B shoulder      ER/scap retraction S  04/08-ER/scap retraction S 3x15 sec H     UBE  04/08-L 3, 3 min F, 3 min B     pulleys      dowel stretches BUE                                    FF                          Abd                           Ext                          ER    04/06- FF 3x15 sec H   04/08- Abd 3x 15 sec H   04/06- Ext 3x15 sec H     ER doorway  04/08- ER doorway S  BUE 3x15 sec H     pendulum      IR Stretch  04/08- IR strap S 3x15 sec H     Post cap S  04/08- post cap S B UE 3x15 sec H      Inferior cap S  04/08- inferior cap S BUE 3x15 sec H     pect S 90/90  04/08- pect S 90/90  3x15 sec H    Bicep/chest S  03/26- bicep/chest S 3x15 sec H     wrist flexor stretch  03/26-added wrist flexor S for L in sup          Lat flex      Lev scap           DB exercises BUE   FF   scaption   Abd   Bicep curls   Forearm    press up   Nepali press    04/08-FF R 3#2x10, L 3#2x10   04/08-scaption R 3# 2x10, L 3#2x10   04/08-Abd R 3# 2x10, L 3#2x10   04/08-bicep curls 4#2x10     04/08- press up 3# 2x10   04/08-Yoruba press ^3# 2x10    Ball on wall ex  Punches  Circles, reverse  Alphabet writing  02/17-added ball on wall ex today BUE   02/17-added punches BUE 10xs   02/17-added circles & reverse BUE 10xs      Wall push ups   table/plinth push ups   mat push ups  03/30- added wall push ups today 10xs   04/06-plinth push ups 2x10          Cable column  04/08- rows 3 pl 2x10          MB exercises    2#PNF diagonals BUE & B directions 10xs each way,      2# RUE single arm circles R B directions 10xs      2# LUE circles B directions 10xs    04/06      4#  press up & out BUE 10xs     4#Ball pass behind  back R>L, L>R 10xs each way     4# trampoline throw 20xs                TB BUE           flex          Abd          scaption          IR          ER          scap retraction          B Ext     02/12- FF red 2x10   02/12- Abd yellow 2x10   02/12- scaption yellow 2x10   04/08- IR BUE blue 2x10   04/08- ER BUE blue 2x10   04/08- Scap retr blue 2x10   04/08- B Ext blue 2x10     TB  tricep          Lat pull downs          B horiz abd          Diagonals          tricep  04/08-tricep blue 2x10   04/08-lat pull downs blue 2x10   04/08- B horiz abd ^green 2x10   04/08- PNF Diagonals w/red 2x10 B directions BUE   04/06 -tricep pull behind head BUE w/red 2x10         DB prone rows                   Ext                   horiz abd  03/16-added rows today BUE w/2#2x10   03/16-added ext today BUE w/1#2x10   03/16-added horiz abd BUE w/1#2x10       DB supine punch ups                       Triceps                       FF  04/06-punch ups 3#2x10   03/23-tricep 2#1x10      DB side lying IR                          ER      sleeper S           RA ROM BUE  04/01-RA AROM BUE-see flow sheet     RA str BUE  04/01-RA BUE str-see flow sheet     PSS  (IE 67/100)  04/01- PSS ^to 85/100    MODALITIES TOTAL TIME FOR SESSION 08-22 Minutes     MH B shoulder  04/08- MH B shoulders x 8 min      ice B shoulder  04/08- ice B shoulders x 5 min                    THER ACT TOTAL TIME FOR SESSION  Not Performed

## 2021-04-08 NOTE — PROGRESS NOTES
"OT DAILY NOTE FOR OUTPATIENT THERAPY    Patient: Muriel Franklin   MRN: [id number]  : 1950 71 y.o.  Referring Physician: Isadora Rebolledo CR*  Date of Visit: 2021      Certification Dates: 21 through 21    Diagnosis:   1. Bilateral shoulder bursitis        Chief Complaints:   Dec ROM, Dec Strength, Pain, Self Care Difficulties, Decreased recreational/play activity    Precautions:  Existing Precautions/Restrictions: no known precautions/restrictions    TODAY'S VISIT    History/Vitals/Pain/Encounter Info - 21 1021        Injury History/Precautions/Daily Required Info    Document Type  daily treatment     Primary Therapist  Pinky Arnett, OTR/L     Chief Complaint/Reason for Visit   Dec ROM, Dec Strength, Pain, Self Care Difficulties, Decreased recreational/play activity     Referring Physician  Dr Potter     Existing Precautions/Restrictions  no known precautions/restrictions     Pertinent History of Current Functional Problem  Pt reports pain L upper arm (bicep/tricep/mid delt area) on & off for ~1 year then ~9 months ago, intermittent pain started in R shoulder/upper arm which has become constant w/in the past 6 months or so, pt notes R shoulder pain>L shoulder but L upper arm pain>R upper arm pain, pt was going to gym 3x week & due to COVID has been doing work out on [employer] w/, she was using 10#DB over head & has decreased weight to 4# or no weight but is still having pain, also notes she started Yoga a few weeks ago, she had 6 month f-u appt w/PCP on 21, discussed shoulder issues, XRays were ordered which reveaed (-)fx or dislocation but moderate DJD B shoulders & pt was referred to OT, pt notes resting pain  2/10, w/use 3/10, ^to 6-7/10 at worst, asael when lifting overhead pending weight & w/sleeping     Patient/Family/Caregiver Comments/Observations  \"I feel good today, I don't know if it's the nice weather or if its the CBD cream I've been using the last " "few nights\"     Start Time  1002     Stop Time  1105     Time Calculation (min)  63 min     Patient reported fall since last visit  No        Pain Assessment    Currently in pain  No/Denies     Pain Rating (0-10): Pre Activity  0     Pain Rating (0-10): Post Activity  1    1/10 L upper arm, 1/2/10 R top of shoulder       Pain Interventions    Intervention   MH B shoulders, TE, ice     Post Intervention Comments  slight increase in pain         Daily Treatment Assessment and Plan - 04/08/21 1626        Daily Treatment Assessment and Plan    Progress toward goals  Progressing     Daily Outcome Summary  Pt entered gym w/no c/o pain today. She was able to ^DB weight by 1# to 3# for [place] press, she was also able  to ^resistance w/TB by 1 level, to green for B horiz abd which was more of a challenge, vc’s required at times during session to maintain proper form w/various exercises, better tolerance for therapy today vs last session     Plan and Recommendations  con't w/POC, progressing strength as nneka             Today's Treatment:      SHOULDER OT FLOW SHEET    EXERCISES CURRENT SESSION TIME   SELF-CARE TOTAL TIME FOR SESSION Not Performed    education/proper positioning  02/28-discussed proper sleeping/sitting positions w/use of pillows & gave HO                   THER EX TOTAL TIME FOR SESSION 38-52 MInutes    PROM RUE/BUE  04/08-PROM B shoulder      ER/scap retraction S  04/08-ER/scap retraction S 3x15 sec H     UBE  04/08-L 3, 3 min F, 3 min B     pulleys      dowel stretches BUE                                    FF                          Abd                           Ext                          ER    04/06- FF 3x15 sec H   04/08- Abd 3x 15 sec H   04/06- Ext 3x15 sec H     ER doorway  04/08- ER doorway S  BUE 3x15 sec H     pendulum      IR Stretch  04/08- IR strap S 3x15 sec H     Post cap S  04/08- post cap S B UE 3x15 sec H      Inferior cap S  04/08- inferior cap S BUE 3x15 sec H     pect S 90/90  04/08- " pect S 90/90  3x15 sec H    Bicep/chest S  03/26- bicep/chest S 3x15 sec H     wrist flexor stretch  03/26-added wrist flexor S for L in sup          Lat flex      Lev scap           DB exercises BUE   FF   scaption   Abd   Bicep curls   Forearm    press up   Kyrgyz press    04/08-FF R 3#2x10, L 3#2x10   04/08-scaption R 3# 2x10, L 3#2x10   04/08-Abd R 3# 2x10, L 3#2x10   04/08-bicep curls 4#2x10     04/08- press up 3# 2x10   04/08-Indonesian press ^3# 2x10    Ball on wall ex  Punches  Circles, reverse  Alphabet writing  02/17-added ball on wall ex today BUE   02/17-added punches BUE 10xs   02/17-added circles & reverse BUE 10xs      Wall push ups   table/plinth push ups   mat push ups  03/30- added wall push ups today 10xs   04/06-plinth push ups 2x10          Cable column  04/08- rows 3 pl 2x10          MB exercises    2#PNF diagonals BUE & B directions 10xs each way,      2# RUE single arm circles R B directions 10xs      2# LUE circles B directions 10xs    04/06      4#  press up & out BUE 10xs     4#Ball pass behind back R>L, L>R 10xs each way     4# trampoline throw 20xs                TB BUE           flex          Abd          scaption          IR          ER          scap retraction          B Ext     02/12- FF red 2x10   02/12- Abd yellow 2x10   02/12- scaption yellow 2x10   04/08- IR BUE blue 2x10   04/08- ER BUE blue 2x10   04/08- Scap retr blue 2x10   04/08- B Ext blue 2x10     TB  tricep          Lat pull downs          B horiz abd          Diagonals          tricep  04/08-tricep blue 2x10   04/08-lat pull downs blue 2x10   04/08- B horiz abd ^green 2x10   04/08- PNF Diagonals w/red 2x10 B directions BUE   04/06 -tricep pull behind head BUE w/red 2x10         DB prone rows                   Ext                   horiz abd  03/16-added rows today BUE w/2#2x10   03/16-added ext today BUE w/1#2x10   03/16-added horiz abd BUE w/1#2x10       DB supine punch ups                       Triceps                        FF  04/06-punch ups 3#2x10   03/23-tricep 2#1x10      DB side lying IR                          ER      sleeper S           RA ROM BUE  04/01-RA AROM BUE-see flow sheet     RA str BUE  04/01-RA BUE str-see flow sheet     PSS  (IE 67/100)  04/01- PSS ^to 85/100    MODALITIES TOTAL TIME FOR SESSION 08-22 Minutes     MH B shoulder  04/08- MH B shoulders x 8 min      ice B shoulder  04/08- ice B shoulders x 5 min                    THER ACT TOTAL TIME FOR SESSION  Not Performed

## 2021-04-13 ENCOUNTER — HOSPITAL ENCOUNTER (OUTPATIENT)
Dept: OCCUPATIONAL THERAPY | Age: 71
Setting detail: THERAPIES SERIES
Discharge: HOME | End: 2021-04-13
Attending: NURSE PRACTITIONER
Payer: MEDICARE

## 2021-04-13 DIAGNOSIS — M75.52 BILATERAL SHOULDER BURSITIS: Primary | ICD-10-CM

## 2021-04-13 DIAGNOSIS — M75.51 BILATERAL SHOULDER BURSITIS: Primary | ICD-10-CM

## 2021-04-13 PROCEDURE — 97110 THERAPEUTIC EXERCISES: CPT | Mod: GO

## 2021-04-13 PROCEDURE — 97010 HOT OR COLD PACKS THERAPY: CPT | Mod: GO

## 2021-04-13 NOTE — PROGRESS NOTES
OT DAILY NOTE FOR OUTPATIENT THERAPY    Patient: Muriel Franklin   MRN: [id number]  : 1950 71 y.o.  Referring Physician: Isadora Rebolledo CR*  Date of Visit: 2021      Certification Dates: 21 through 21    Diagnosis:   1. Bilateral shoulder bursitis        Chief Complaints:   Dec ROM, Dec Strength, Pain, Self Care Difficulties, Decreased recreational/play activity    Precautions:  Existing Precautions/Restrictions: no known precautions/restrictions    TODAY'S VISIT    History/Vitals/Pain/Encounter Info - 21 1014        Injury History/Precautions/Daily Required Info    Document Type  daily treatment     Primary Therapist  Pinky Arnett, OTR/L     Chief Complaint/Reason for Visit   Dec ROM, Dec Strength, Pain, Self Care Difficulties, Decreased recreational/play activity     Referring Physician  Dr Potter     Existing Precautions/Restrictions  no known precautions/restrictions     Pertinent History of Current Functional Problem  Pt reports pain L upper arm (bicep/tricep/mid delt area) on & off for ~1 year then ~9 months ago, intermittent pain started in R shoulder/upper arm which has become constant w/in the past 6 months or so, pt notes R shoulder pain>L shoulder but L upper arm pain>R upper arm pain, pt was going to gym 3x week & due to COVID has been doing work out on [employer] w/, she was using 10#DB over head & has decreased weight to 4# or no weight but is still having pain, also notes she started Yoga a few weeks ago, she had 6 month f-u appt w/PCP on 21, discussed shoulder issues, XRays were ordered which reveaed (-)fx or dislocation but moderate DJD B shoulders & pt was referred to OT, pt notes resting pain  2/10, w/use 3/10, ^to 6-7/10 at worst, asael when lifting overhead pending weight & w/sleeping     Start Time  1000     Stop Time  1105     Time Calculation (min)  65 min     Patient reported fall since last visit  No        Pain Assessment    Currently  in pain  Yes     Preferred Pain Scale  number (Numeric Rating Pain Scale)     Pain: Body location  Shoulder     Pain Rating (0-10): Pre Activity  --    1/2-1/10 R top of shoulder, L upper arm    Pain Rating (0-10): Post Activity  --    no pain R shoulder, 1/2/10 L upper arm       Pain Interventions    Intervention   MH B shoulders, TE, ice     Post Intervention Comments  therapy reduced pain         Daily Treatment Assessment and Plan - 04/13/21 1106        Daily Treatment Assessment and Plan    Progress toward goals  Progressing     Daily Outcome Summary  Pt was able to ^DB weights by 1# to 4# for FF, she also increased resistance w/2 TB exercises today (tricep & PNF diagonals in both directions) by 1 level, to green which was a good challenge for her, and  1 new exercise was added, ER @90 deg for BUE, requiring tactile cueing to maintain proper form, good work out w/o increase in symptoms     Plan and Recommendations  con't w/POC, progressing strength as nneka           Today's Treatment:    SHOULDER OT FLOW SHEET    EXERCISES CURRENT SESSION TIME   SELF-CARE TOTAL TIME FOR SESSION Not Performed    education/proper positioning  02/28-discussed proper sleeping/sitting positions w/use of pillows & gave HO                   THER EX TOTAL TIME FOR SESSION 38-52 MInutes    PROM RUE/BUE  04/13-PROM B shoulder      ER/scap retraction S  04/13-ER/scap retraction S 3x15 sec H     UBE  04/08-L 3, 3 min F, 3 min B     pulleys      dowel stretches BUE                                    FF                          Abd                           Ext                          ER    04/13- FF 3x15 sec H   04/13- Abd 3x15 sec H   04/13- Ext 3x15 sec H     ER doorway  04/08- ER doorway S  BUE 3x15 sec H     pendulum      IR Stretch  04/13- IR strap S 3x15 sec H     Post cap S  04/13- post cap S B UE 3x15 sec H      Inferior cap S  04/13- inferior cap S BUE 3x15 sec H     pect S 90/90  04/13- pect S 90/90  3x15 sec H    Bicep/chest S   03/26- bicep/chest S 3x15 sec H     wrist flexor stretch  03/26-added wrist flexor S for L in sup          Lat flex      Lev scap           DB exercises BUE   FF   scaption   Abd   Bicep curls   Forearm    press up   [place]ymro press    04/13-FF R ^4#2x10, L ^4#2x10   04/13-scaption R 3# 2x10, L 3#2x10   04/13-Abd R 3# 2x10, L 3#2x10   04/13-bicep curls 4#2x10     04/13- press up 3# 2x10   04/13-Lithuanian press 3# 2x10    Ball on wall ex  Punches  Circles, reverse  Alphabet writing  02/17-added ball on wall ex today BUE   02/17-added punches BUE 10xs   02/17-added circles & reverse BUE 10xs      Wall push ups   table/plinth push ups   mat push ups  03/30- added wall push ups today 10xs   04/13-plinth push ups 2x10          Cable column  04/08- rows 3 pl 2x10          MB exercises    2#PNF diagonals BUE & B directions 10xs each way,      2# RUE single arm circles R B directions 10xs      2# LUE circles B directions 10xs    04/13      4# press up & out BUE 10xs     4#Ball pass behind back R>L, L>R 10xs each way     4# trampoline throw 20xs                TB BUE           flex          Abd          scaption          IR          ER(neutral)          ER @90 deg          scap retraction          B Ext     02/12- FF red 2x10   02/12- Abd yellow 2x10   02/12- scaption yellow 2x10   04/13- IR BUE blue 2x10   04/13- ER(neutral) BUE blue 2x10   04/13-added ER @90 deg today BUE w/red 2x10   04/13- Scap retr blue 2x10   04/13- B Ext blue 2x10     TB  tricep          Lat pull downs          B horiz abd          Diagonals          tricep  04/13-tricep blue 2x10   04/13-lat pull downs blue 2x10   04/13- B horiz abd green 2x10   04/13- PNF Diagonals ^green 2x10 B directions BUE   04/13 -tricep pull behind head BUE ^green 2x10         DB prone rows                   Ext                   horiz abd  03/16-added rows today BUE w/2#2x10   03/16-added ext today BUE w/1#2x10   03/16-added horiz abd BUE w/1#2x10       DB supine  punch ups                       Triceps                       FF  04/06-punch ups 3#2x10   03/23-tricep 2#1x10      DB side lying IR                          ER      sleeper S           RA ROM BUE  04/01-RA AROM BUE-see flow sheet     RA str BUE  04/01-RA BUE str-see flow sheet     PSS  (IE 67/100)  04/01- PSS ^to 85/100    MODALITIES TOTAL TIME FOR SESSION 08-22 Minutes     MH B shoulder  04/13- MH B shoulders x 8 min      ice B shoulder  04/13- ice B shoulders x 5 min                    THER ACT TOTAL TIME FOR SESSION  Not Performed

## 2021-04-13 NOTE — OP OT TREATMENT LOG
SHOULDER OT FLOW SHEET    EXERCISES CURRENT SESSION TIME   SELF-CARE TOTAL TIME FOR SESSION Not Performed    education/proper positioning  02/28-discussed proper sleeping/sitting positions w/use of pillows & gave HO                   THER EX TOTAL TIME FOR SESSION 38-52 MInutes    PROM RUE/BUE  04/13-PROM B shoulder      ER/scap retraction S  04/13-ER/scap retraction S 3x15 sec H     UBE  04/08-L 3, 3 min F, 3 min B     pulleys      dowel stretches BUE                                    FF                          Abd                           Ext                          ER    04/13- FF 3x15 sec H   04/13- Abd 3x15 sec H   04/13- Ext 3x15 sec H     ER doorway  04/08- ER doorway S  BUE 3x15 sec H     pendulum      IR Stretch  04/13- IR strap S 3x15 sec H     Post cap S  04/13- post cap S B UE 3x15 sec H      Inferior cap S  04/13- inferior cap S BUE 3x15 sec H     pect S 90/90  04/13- pect S 90/90  3x15 sec H    Bicep/chest S  03/26- bicep/chest S 3x15 sec H     wrist flexor stretch  03/26-added wrist flexor S for L in sup          Lat flex      Lev scap           DB exercises BUE   FF   scaption   Abd   Bicep curls   Forearm    press up   [place]yrgyz press    04/13-FF R ^4#2x10, L ^4#2x10   04/13-scaption R 3# 2x10, L 3#2x10   04/13-Abd R 3# 2x10, L 3#2x10   04/13-bicep curls 4#2x10     04/13- press up 3# 2x10   04/13-Japanese press 3# 2x10    Ball on wall ex  Punches  Circles, reverse  Alphabet writing  02/17-added ball on wall ex today BUE   02/17-added punches BUE 10xs   02/17-added circles & reverse BUE 10xs      Wall push ups   table/plinth push ups   mat push ups  03/30- added wall push ups today 10xs   04/13-plinth push ups 2x10          Cable column  04/08- rows 3 pl 2x10          MB exercises    2#PNF diagonals BUE & B directions 10xs each way,      2# RUE single arm circles R B directions 10xs      2# LUE circles B directions 10xs    04/13      4# press up & out BUE 10xs     4#Ball pass behind  back R>L, L>R 10xs each way     4# trampoline throw 20xs                TB BUE           flex          Abd          scaption          IR          ER(neutral)          ER @90 deg          scap retraction          B Ext     02/12- FF red 2x10   02/12- Abd yellow 2x10   02/12- scaption yellow 2x10   04/13- IR BUE blue 2x10   04/13- ER(neutral) BUE blue 2x10   04/13-added ER @90 deg today BUE w/red 2x10   04/13- Scap retr blue 2x10   04/13- B Ext blue 2x10     TB  tricep          Lat pull downs          B horiz abd          Diagonals          tricep  04/13-tricep blue 2x10   04/13-lat pull downs blue 2x10   04/13- B horiz abd green 2x10   04/13- PNF Diagonals ^green 2x10 B directions BUE   04/13 -tricep pull behind head BUE ^green 2x10         DB prone rows                   Ext                   horiz abd  03/16-added rows today BUE w/2#2x10   03/16-added ext today BUE w/1#2x10   03/16-added horiz abd BUE w/1#2x10       DB supine punch ups                       Triceps                       FF  04/06-punch ups 3#2x10   03/23-tricep 2#1x10      DB side lying IR                          ER      sleeper S           RA ROM BUE  04/01-RA AROM BUE-see flow sheet     RA str BUE  04/01-RA BUE str-see flow sheet     PSS  (IE 67/100)  04/01- PSS ^to 85/100    MODALITIES TOTAL TIME FOR SESSION 08-22 Minutes     MH B shoulder  04/13- MH B shoulders x 8 min      ice B shoulder  04/13- ice B shoulders x 5 min                    THER ACT TOTAL TIME FOR SESSION  Not Performed

## 2021-04-15 ENCOUNTER — HOSPITAL ENCOUNTER (OUTPATIENT)
Dept: OCCUPATIONAL THERAPY | Age: 71
Setting detail: THERAPIES SERIES
Discharge: HOME | End: 2021-04-15
Attending: NURSE PRACTITIONER
Payer: MEDICARE

## 2021-04-15 DIAGNOSIS — M75.51 BILATERAL SHOULDER BURSITIS: Primary | ICD-10-CM

## 2021-04-15 DIAGNOSIS — M75.52 BILATERAL SHOULDER BURSITIS: Primary | ICD-10-CM

## 2021-04-15 PROCEDURE — 97010 HOT OR COLD PACKS THERAPY: CPT | Mod: GO

## 2021-04-15 PROCEDURE — 97110 THERAPEUTIC EXERCISES: CPT | Mod: GO

## 2021-04-15 NOTE — OP OT TREATMENT LOG
SHOULDER OT FLOW SHEET    EXERCISES CURRENT SESSION TIME   SELF-CARE TOTAL TIME FOR SESSION Not Performed    education/proper positioning  02/28-discussed proper sleeping/sitting positions w/use of pillows & gave HO                   THER EX TOTAL TIME FOR SESSION 38-52 MInutes    PROM RUE/BUE  04/15-PROM B shoulder      ER/scap retraction S  04/15-ER/scap retraction S 3x15 sec H     UBE  04/08-L 3, 3 min F, 3 min B     pulleys      dowel stretches BUE                                    FF                          Abd                           Ext                          ER    04/15- FF 3x15 sec H   04/15- Abd 3x15 sec H   04/15- Ext 3x15 sec H     ER doorway  04/15- ER doorway S  BUE 3x15 sec H     pendulum      IR Stretch  04/15- IR strap S 3x15 sec H     Post cap S  04/15- post cap S B UE 3x15 sec H      Inferior cap S  04/15- inferior cap S BUE 3x15 sec H     pect S 90/90  04/15- pect S 90/90  3x15 sec H    Bicep/chest S  03/26- bicep/chest S 3x15 sec H     wrist flexor stretch  03/26-added wrist flexor S for L in sup          Lat flex      Lev scap           DB exercises BUE   FF   scaption   Abd   Bicep curls   Forearm    press up   [place]an press    04/15-FF R 4#2x10, L 4#2x10   04/15-scaption R ^4# 2x10, L^4#2x10   04/13-Abd R 3# 2x10, L 3#2x10   04/13-bicep curls 4#2x10     04/13- press up 3# 2x10   04/13-Turkmen press 3# 2x10    Ball on wall ex  Punches  Circles, reverse  Alphabet writing  02/17-added ball on wall ex today BUE   02/17-added punches BUE 10xs   02/17-added circles & reverse BUE 10xs      Wall push ups   table/plinth push ups   mat push ups  03/30- added wall push ups today 10xs   04/15-plinth push ups 2x10          Cable column  04/08- rows 3 pl 2x10          MB exercises    2#PNF diagonals BUE & B directions 10xs each way   04/15      4# press up & out BUE 10xs     4#Ball pass behind back R>L, L>R 10xs each way     4# trampoline throw 20xs     2# single arm circles & reverse  BUE 10xs                TB BUE           flex          Abd          scaption          IR          ER(neutral)          ER @90 deg          scap retraction          B Ext     02/12- FF red 2x10   02/12- Abd yellow 2x10   02/12- scaption yellow 2x10   04/15- IR BUE blue 2x10   04/15- ER(neutral) BUE blue 2x10   04/15 -ER @90 deg BUE ^green 2x10   04/15- Scap retr blue 2x10   04/15- B Ext blue 2x10     TB  tricep          Lat pull downs          B horiz abd          Diagonals          tricep  04/15-tricep blue 2x10   04/15-lat pull downs blue 2x10   04/15- B horiz abd ^blue 2x10   04/15- PNF Diagonals green 2x10 B directions BUE   04/15 -tricep pull behind head BUE green 2x10         DB prone rows                   Ext                   horiz abd  03/16-added rows today BUE w/2#2x10   03/16-added ext today BUE w/1#2x10   03/16-added horiz abd BUE w/1#2x10       DB supine punch ups                       Triceps                       FF  04/06-punch ups 3#2x10   03/23-tricep 2#1x10      DB side lying IR                          ER      sleeper S           RA ROM BUE  04/01-RA AROM BUE-see flow sheet     RA str BUE  04/01-RA BUE str-see flow sheet     PSS  (IE 67/100)  04/01- PSS ^to 85/100    MODALITIES TOTAL TIME FOR SESSION 08-22 Minutes     MH B shoulder  04/15- MH B shoulders x 8 min      ice B shoulder  04/15- ice B shoulders x 5 min                    THER ACT TOTAL TIME FOR SESSION  Not Performed

## 2021-04-15 NOTE — PROGRESS NOTES
OT DAILY NOTE FOR OUTPATIENT THERAPY    Patient: Muriel Franklin   MRN: [id number]  : 1950 71 y.o.  Referring Physician: Isadora Rebolledo CR*  Date of Visit: 4/15/2021      Certification Dates: 21 through 21    Diagnosis:   1. Bilateral shoulder bursitis        Chief Complaints:   Dec ROM, Dec Strength, Pain, Self Care Difficulties, Decreased recreational/play activity    Precautions:  Existing Precautions/Restrictions: no known precautions/restrictions    TODAY'S VISIT    History/Vitals/Pain/Encounter Info - 04/15/21 1508        Injury History/Precautions/Daily Required Info    Document Type  daily treatment     Primary Therapist  Pinky Arnett, OTR/L     Chief Complaint/Reason for Visit   Dec ROM, Dec Strength, Pain, Self Care Difficulties, Decreased recreational/play activity     Referring Physician  Dr Potter     Existing Precautions/Restrictions  no known precautions/restrictions     Pertinent History of Current Functional Problem  Pt reports pain L upper arm (bicep/tricep/mid delt area) on & off for ~1 year then ~9 months ago, intermittent pain started in R shoulder/upper arm which has become constant w/in the past 6 months or so, pt notes R shoulder pain>L shoulder but L upper arm pain>R upper arm pain, pt was going to gym 3x week & due to COVID has been doing work out on [employer] w/, she was using 10#DB over head & has decreased weight to 4# or no weight but is still having pain, also notes she started Yoga a few weeks ago, she had 6 month f-u appt w/PCP on 21, discussed shoulder issues, XRays were ordered which reveaed (-)fx or dislocation but moderate DJD B shoulders & pt was referred to OT, pt notes resting pain  2/10, w/use 3/10, ^to 6-7/10 at worst, asael when lifting overhead pending weight & w/sleeping     Start Time  1503     Stop Time  1607     Time Calculation (min)  64 min     Patient reported fall since last visit  No        Pain Assessment    Currently  in pain  No/Denies     Pain Rating (0-10): Pre Activity  0     Pain Rating (0-10): Post Activity  0        Pain Interventions    Intervention   MH B shoulders for warm up, TE, ice     Post Intervention Comments  no pain         Daily Treatment Assessment and Plan - 04/15/21 1604        Daily Treatment Assessment and Plan    Progress toward goals  Progressing     Daily Outcome Summary  continued to work on challenging & progressing BUE strength. pt was able to ^resistance w/2 TB exercises today by 1 level, to blue for B horiz abd & to green for ER @90 deg, tactile cueing initially required w/ER @90 deg to maintain proper form, after a few reps, she was able to perform w/o cueing. she also ^DB weight by 1# to 4# for scaption, nneka session well     Plan and Recommendations  con't w/POC x 2 more weeks & D/C to HEP         Today's Treatment:    SHOULDER OT FLOW SHEET    EXERCISES CURRENT SESSION TIME   SELF-CARE TOTAL TIME FOR SESSION Not Performed    education/proper positioning  02/28-discussed proper sleeping/sitting positions w/use of pillows & gave HO                   THER EX TOTAL TIME FOR SESSION 38-52 MInutes    PROM RUE/BUE  04/15-PROM B shoulder      ER/scap retraction S  04/15-ER/scap retraction S 3x15 sec H     UBE  04/08-L 3, 3 min F, 3 min B     pulleys      dowel stretches BUE                                    FF                          Abd                           Ext                          ER    04/15- FF 3x15 sec H   04/15- Abd 3x15 sec H   04/15- Ext 3x15 sec H     ER doorway  04/15- ER doorway S  BUE 3x15 sec H     pendulum      IR Stretch  04/15- IR strap S 3x15 sec H     Post cap S  04/15- post cap S B UE 3x15 sec H      Inferior cap S  04/15- inferior cap S BUE 3x15 sec H     pect S 90/90  04/15- pect S 90/90  3x15 sec H    Bicep/chest S  03/26- bicep/chest S 3x15 sec H     wrist flexor stretch  03/26-added wrist flexor S for L in sup          Lat flex      Lev scap           DB exercises  BUE   FF   scaption   Abd   Bicep curls   Forearm    press up   [place]ayan press    04/15-FF R 4#2x10, L 4#2x10   04/15-scaption R ^4# 2x10, L^4#2x10   04/13-Abd R 3# 2x10, L 3#2x10   04/13-bicep curls 4#2x10     04/13- press up 3# 2x10   04/13-Irish press 3# 2x10    Ball on wall ex  Punches  Circles, reverse  Alphabet writing  02/17-added ball on wall ex today BUE   02/17-added punches BUE 10xs   02/17-added circles & reverse BUE 10xs      Wall push ups   table/plinth push ups   mat push ups  03/30- added wall push ups today 10xs   04/15-plinth push ups 2x10          Cable column  04/08- rows 3 pl 2x10          MB exercises    2#PNF diagonals BUE & B directions 10xs each way   04/15      4# press up & out BUE 10xs     4#Ball pass behind back R>L, L>R 10xs each way     4# trampoline throw 20xs     2# single arm circles & reverse BUE 10xs                TB BUE           flex          Abd          scaption          IR          ER(neutral)          ER @90 deg          scap retraction          B Ext     02/12- FF red 2x10   02/12- Abd yellow 2x10   02/12- scaption yellow 2x10   04/15- IR BUE blue 2x10   04/15- ER(neutral) BUE blue 2x10   04/15 -ER @90 deg BUE ^green 2x10   04/15- Scap retr blue 2x10   04/15- B Ext blue 2x10     TB  tricep          Lat pull downs          B horiz abd          Diagonals          tricep  04/15-tricep blue 2x10   04/15-lat pull downs blue 2x10   04/15- B horiz abd ^blue 2x10   04/15- PNF Diagonals green 2x10 B directions BUE   04/15 -tricep pull behind head BUE green 2x10         DB prone rows                   Ext                   horiz abd  03/16-added rows today BUE w/2#2x10   03/16-added ext today BUE w/1#2x10   03/16-added horiz abd BUE w/1#2x10       DB supine punch ups                       Triceps                       FF  04/06-punch ups 3#2x10   03/23-tricep 2#1x10      DB side lying IR                          ER      sleeper S           RA ROM JEIMYE  04/01-LANA AROM  BUE-see flow sheet     RA str BUE  04/01-RA BUE str-see flow sheet     PSS  (IE 67/100)  04/01- PSS ^to 85/100    MODALITIES TOTAL TIME FOR SESSION 08-22 Minutes     MH B shoulder  04/15- MH B shoulders x 8 min      ice B shoulder  04/15- ice B shoulders x 5 min                    THER ACT TOTAL TIME FOR SESSION  Not Performed

## 2021-04-16 ENCOUNTER — TELEPHONE (OUTPATIENT)
Dept: PRIMARY CARE | Facility: CLINIC | Age: 71
End: 2021-04-16

## 2021-04-16 RX ORDER — TOPIRAMATE 25 MG/1
25 TABLET ORAL
Qty: 90 TABLET | Refills: 0 | Status: SHIPPED | OUTPATIENT
Start: 2021-04-16 | End: 2021-04-16

## 2021-04-16 RX ORDER — TOPIRAMATE 25 MG/1
25 TABLET ORAL
Qty: 90 TABLET | Refills: 0 | Status: SHIPPED | OUTPATIENT
Start: 2021-04-16 | End: 2021-08-19

## 2021-04-16 NOTE — TELEPHONE ENCOUNTER
Patient needs a medication refill on her...    (Topiramate 25 mg)    She uses the [facility] ([phone])

## 2021-04-20 ENCOUNTER — HOSPITAL ENCOUNTER (OUTPATIENT)
Dept: OCCUPATIONAL THERAPY | Age: 71
Setting detail: THERAPIES SERIES
Discharge: HOME | End: 2021-04-20
Attending: NURSE PRACTITIONER
Payer: MEDICARE

## 2021-04-20 DIAGNOSIS — I63.9 CEREBROVASCULAR ACCIDENT (CVA), UNSPECIFIED MECHANISM (CMS/HCC): Primary | ICD-10-CM

## 2021-04-20 PROCEDURE — 97110 THERAPEUTIC EXERCISES: CPT | Mod: GO

## 2021-04-20 PROCEDURE — 97010 HOT OR COLD PACKS THERAPY: CPT | Mod: GO

## 2021-04-20 NOTE — OP OT TREATMENT LOG
SHOULDER OT FLOW SHEET    EXERCISES CURRENT SESSION TIME   SELF-CARE TOTAL TIME FOR SESSION Not Performed    education/proper positioning  02/28-discussed proper sleeping/sitting positions w/use of pillows & gave HO                   THER EX TOTAL TIME FOR SESSION 38-52 MInutes    PROM RUE/BUE  04/15-PROM B shoulder      ER/scap retraction S  04/15-ER/scap retraction S 3x15 sec H     UBE  04/08-L 3, 3 min F, 3 min B     pulleys      dowel stretches BUE                                    FF                          Abd                           Ext                          ER    04/15- FF 3x15 sec H   04/15- Abd 3x15 sec H   04/15- Ext 3x15 sec H     ER doorway  04/15- ER doorway S  BUE 3x15 sec H     pendulum      IR Stretch  04/15- IR strap S 3x15 sec H     Post cap S  04/15- post cap S B UE 3x15 sec H      Inferior cap S  04/15- inferior cap S BUE 3x15 sec H     pect S 90/90  04/15- pect S 90/90  3x15 sec H    Bicep/chest S  03/26- bicep/chest S 3x15 sec H     wrist flexor stretch  03/26-added wrist flexor S for L in sup          Lat flex      Lev scap           DB exercises BUE   FF   scaption   Abd   Bicep curls   Forearm    press up   Honduran press    04/15-FF R 4#2x10, L 4#2x10   04/15-scaption R ^4# 2x10, L^4#2x10   04/13-Abd R 3# 2x10, L 3#2x10   04/13-bicep curls 4#2x10     04/13- press up 3# 2x10   04/13-Tajik press 3# 2x10    Ball on wall ex  Punches  Circles, reverse  Alphabet writing  02/17-added ball on wall ex today BUE   02/17-added punches BUE 10xs   02/17-added circles & reverse BUE 10xs      Wall push ups   table/plinth push ups   mat push ups  03/30- added wall push ups today 10xs   04/15-plinth push ups 2x10          Cable column  04/08- rows 3 pl 2x10          MB exercises    2#PNF diagonals BUE & B directions 10xs each way   04/15      4# press up & out BUE 10xs     4#Ball pass behind back R>L, L>R 10xs each way     4# trampoline throw 20xs     2# single arm circles & reverse  BUE 10xs                TB BUE           flex          Abd          scaption          IR          ER(neutral)          ER @90 deg          scap retraction          B Ext     02/12- FF red 2x10   02/12- Abd yellow 2x10   02/12- scaption yellow 2x10   04/15- IR BUE blue 2x10   04/15- ER(neutral) BUE blue 2x10   04/15 -ER @90 deg BUE ^green 2x10   04/15- Scap retr blue 2x10   04/15- B Ext blue 2x10     TB  tricep          Lat pull downs          B horiz abd          Diagonals          tricep  04/15-tricep blue 2x10   04/15-lat pull downs blue 2x10   04/15- B horiz abd ^blue 2x10   04/15- PNF Diagonals green 2x10 B directions BUE   04/15 -tricep pull behind head BUE green 2x10         DB prone rows                   Ext                   horiz abd  03/16-added rows today BUE w/2#2x10   03/16-added ext today BUE w/1#2x10   03/16-added horiz abd BUE w/1#2x10       DB supine punch ups                       Triceps                       FF  04/06-punch ups 3#2x10   03/23-tricep 2#1x10      DB side lying IR                          ER      sleeper S           RA ROM BUE  04/01-RA AROM BUE-see flow sheet     RA str BUE  04/01-RA BUE str-see flow sheet     PSS  (IE 67/100)  04/01- PSS ^to 85/100    MODALITIES TOTAL TIME FOR SESSION 08-22 Minutes     MH B shoulder  04/15- MH B shoulders x 8 min      ice B shoulder  04/15- ice B shoulders x 5 min                    THER ACT TOTAL TIME FOR SESSION  Not Performed

## 2021-04-20 NOTE — PROGRESS NOTES
"OT DAILY NOTE FOR OUTPATIENT THERAPY    Patient: Muriel Franklin   MRN: [id number]  : 1950 71 y.o.  Referring Physician: Isadora Rebolledo CR*  Date of Visit: 2021      Certification Dates: 21 through 21    Diagnosis:   1. Bilateral shoulder bursitis (Primary Dx)       Chief Complaints:   Dec ROM, Dec Strength, Pain, Self Care Difficulties, Decreased recreational/play activity    Precautions:  Existing Precautions/Restrictions: no known precautions/restrictions    TODAY'S VISIT    History/Vitals/Pain/Encounter Info - 21 1006        Injury History/Precautions/Daily Required Info    Document Type  daily treatment     Primary Therapist  Pinky Arnett, OTR/L     Chief Complaint/Reason for Visit   Dec ROM, Dec Strength, Pain, Self Care Difficulties, Decreased recreational/play activity     Referring Physician  Dr Potter     Existing Precautions/Restrictions  no known precautions/restrictions     Pertinent History of Current Functional Problem  Pt reports pain L upper arm (bicep/tricep/mid delt area) on & off for ~1 year then ~9 months ago, intermittent pain started in R shoulder/upper arm which has become constant w/in the past 6 months or so, pt notes R shoulder pain>L shoulder but L upper arm pain>R upper arm pain, pt was going to gym 3x week & due to COVID has been doing work out on [employer] w/, she was using 10#DB over head & has decreased weight to 4# or no weight but is still having pain, also notes she started Yoga a few weeks ago, she had 6 month f-u appt w/PCP on 21, discussed shoulder issues, XRays were ordered which reveaed (-)fx or dislocation but moderate DJD B shoulders & pt was referred to OT, pt notes resting pain  2/10, w/use 3/10, ^to 6-7/10 at worst, asael when lifting overhead pending weight & w/sleeping     Patient/Family/Caregiver Comments/Observations  \"I feel good, I was able to help out with getting the boat ready this past week to make sure " "it was clean. I had trouble with the cold weather on Saturday but did not have any pain.\"     Start Time  1003     Stop Time  1103     Time Calculation (min)  60 min     Patient reported fall since last visit  No        Pain Assessment    Currently in pain  No/Denies        Pain Interventions    Intervention   MH B shoulders for warm up, TE, ice     Post Intervention Comments  no c/o pain         Daily Treatment Assessment and Plan - 04/20/21 1446        Daily Treatment Assessment and Plan    Progress toward goals  Progressing     Daily Outcome Summary  Pt continued to work on progressive resistance BUE strengthening exercises. Pt was able to maintain resistance that was upgraded previous session at blue resistance band for TB horiz abd and green for ER @90 deg. Pt required cueing for maintaining proper 90* form with 2 shoulder hikes this session. Pt improved to no cueing to perform activty towards end of exercises. Pt was able to maintain upgraded DB weight at 4# this session, reported mild R shoulder pain (1.5/10) by end of session, ice and PROM exercises provided to assist with pain management.     Plan and Recommendations  con't w/POC x1.5 more weeks & D/C to HEP           OBJECTIVE DATA TAKEN TODAY    None Taken    Today's Treatment:    SHOULDER OT FLOW SHEET    EXERCISES CURRENT SESSION TIME   SELF-CARE TOTAL TIME FOR SESSION Not Performed    education/proper positioning  02/28-discussed proper sleeping/sitting positions w/use of pillows & gave HO                   THER EX TOTAL TIME FOR SESSION 38-52 MInutes    PROM RUE/BUE  04/15-PROM B shoulder      ER/scap retraction S  04/15-ER/scap retraction S 3x15 sec H     UBE  04/08-L 3, 3 min F, 3 min B     pulleys      dowel stretches BUE                                    FF                          Abd                           Ext                          ER    04/15- FF 3x15 sec H   04/15- Abd 3x15 sec H   04/15- Ext 3x15 sec H     ER doorway  04/15- ER doorway " S  BUE 3x15 sec H     pendulum      IR Stretch  04/15- IR strap S 3x15 sec H     Post cap S  04/15- post cap S B UE 3x15 sec H      Inferior cap S  04/15- inferior cap S BUE 3x15 sec H     pect S 90/90  04/15- pect S 90/90  3x15 sec H    Bicep/chest S  03/26- bicep/chest S 3x15 sec H     wrist flexor stretch  03/26-added wrist flexor S for L in sup          Lat flex      Lev scap           DB exercises BUE   FF   scaption   Abd   Bicep curls   Forearm    press up   Iraqi press    04/15-FF R 4#2x10, L 4#2x10   04/15-scaption R ^4# 2x10, L^4#2x10   04/13-Abd R 3# 2x10, L 3#2x10   04/13-bicep curls 4#2x10     04/13- press up 3# 2x10   04/13-Khmer press 3# 2x10    Ball on wall ex  Punches  Circles, reverse  Alphabet writing  02/17-added ball on wall ex today BUE   02/17-added punches BUE 10xs   02/17-added circles & reverse BUE 10xs      Wall push ups   table/plinth push ups   mat push ups  03/30- added wall push ups today 10xs   04/15-plinth push ups 2x10          Cable column  04/08- rows 3 pl 2x10          MB exercises    2#PNF diagonals BUE & B directions 10xs each way   04/15      4# press up & out BUE 10xs     4#Ball pass behind back R>L, L>R 10xs each way     4# trampoline throw 20xs     2# single arm circles & reverse BUE 10xs                TB BUE           flex          Abd          scaption          IR          ER(neutral)          ER @90 deg          scap retraction          B Ext     02/12- FF red 2x10   02/12- Abd yellow 2x10   02/12- scaption yellow 2x10   04/15- IR BUE blue 2x10   04/15- ER(neutral) BUE blue 2x10   04/15 -ER @90 deg BUE ^green 2x10   04/15- Scap retr blue 2x10   04/15- B Ext blue 2x10     TB  tricep          Lat pull downs          B horiz abd          Diagonals          tricep  04/15-tricep blue 2x10   04/15-lat pull downs blue 2x10   04/15- B horiz abd ^blue 2x10   04/15- PNF Diagonals green 2x10 B directions BUE   04/15 -tricep pull behind head BUE green 2x10          DB prone rows                   Ext                   horiz abd  03/16-added rows today BUE w/2#2x10   03/16-added ext today BUE w/1#2x10   03/16-added horiz abd BUE w/1#2x10       DB supine punch ups                       Triceps                       FF  04/06-punch ups 3#2x10   03/23-tricep 2#1x10      DB side lying IR                          ER      sleeper S           RA ROM BUE  04/01-RA AROM BUE-see flow sheet     RA str BUE  04/01-RA BUE str-see flow sheet     PSS  (IE 67/100)  04/01- PSS ^to 85/100    MODALITIES TOTAL TIME FOR SESSION 08-22 Minutes     MH B shoulder  04/15- MH B shoulders x 8 min      ice B shoulder  04/15- ice B shoulders x 5 min                    THER ACT TOTAL TIME FOR SESSION  Not Performed

## 2021-04-22 ENCOUNTER — HOSPITAL ENCOUNTER (OUTPATIENT)
Dept: OCCUPATIONAL THERAPY | Age: 71
Setting detail: THERAPIES SERIES
Discharge: HOME | End: 2021-04-22
Attending: NURSE PRACTITIONER
Payer: MEDICARE

## 2021-04-22 DIAGNOSIS — M19.90 OSTEOARTHRITIS, UNSPECIFIED OSTEOARTHRITIS TYPE, UNSPECIFIED SITE: Primary | ICD-10-CM

## 2021-04-22 PROCEDURE — 97010 HOT OR COLD PACKS THERAPY: CPT | Mod: GO,KX

## 2021-04-22 PROCEDURE — 97110 THERAPEUTIC EXERCISES: CPT | Mod: GO,KX

## 2021-04-22 NOTE — PROGRESS NOTES
OT DAILY NOTE FOR OUTPATIENT THERAPY    Patient: Muriel Franklin   MRN: [id number]  : 1950 71 y.o.  Referring Physician: Isadora Rebolledo CR*  Date of Visit: 2021      Certification Dates: 21 through 21    Diagnosis:   1. Bilateral shoulder bursitis     Chief Complaints:   Dec ROM, Dec Strength, Pain, Self Care Difficulties, Decreased recreational/play activity    Precautions:  Existing Precautions/Restrictions: no known precautions/restrictions    TODAY'S VISIT    History/Vitals/Pain/Encounter Info - 21 1406        Injury History/Precautions/Daily Required Info    Document Type  daily treatment     Primary Therapist  Pinky Arnett, OTR/L     Chief Complaint/Reason for Visit   Dec ROM, Dec Strength, Pain, Self Care Difficulties, Decreased recreational/play activity     Referring Physician  Dr Potter     Existing Precautions/Restrictions  no known precautions/restrictions     Pertinent History of Current Functional Problem  Pt reports pain L upper arm (bicep/tricep/mid delt area) on & off for ~1 year then ~9 months ago, intermittent pain started in R shoulder/upper arm which has become constant w/in the past 6 months or so, pt notes R shoulder pain>L shoulder but L upper arm pain>R upper arm pain, pt was going to gym 3x week & due to COVID has been doing work out on [employer] w/, she was using 10#DB over head & has decreased weight to 4# or no weight but is still having pain, also notes she started Yoga a few weeks ago, she had 6 month f-u appt w/PCP on 21, discussed shoulder issues, XRays were ordered which reveaed (-)fx or dislocation but moderate DJD B shoulders & pt was referred to OT, pt notes resting pain  2/10, w/use 3/10, ^to 6-7/10 at worst, asael when lifting overhead pending weight & w/sleeping     Patient/Family/Caregiver Comments/Observations  Pt reports feeling more tired today. Pt has been cooking all morning to get ready for [place] [employer] next  weekend. Pt tried golfing yesterday and reports had no pain during activity, was feeling mild fatigue afterwards.     Start Time  1402     Stop Time  1502     Time Calculation (min)  60 min     Patient reported fall since last visit  No        Pain Assessment    Currently in pain  No/Denies     Pain Rating (0-10): Pre Activity  0     Pain Rating (0-10): Post Activity  0        Pain Interventions    Intervention    B shoulders for warm up, TE, ice     Post Intervention Comments  no increase in pain         Daily Treatment Assessment and Plan - 04/22/21 1458        Daily Treatment Assessment and Plan    Progress toward goals  Progressing     Daily Outcome Summary  Pt participated in ongoing BUE strengthening and AROM exercises/stretches to increase participation in daily tasks. Pt continues to tolerate upgraded DB/TB levels from previous sessions and requires limited rest breaks throughout session. Pt reports feeling good, steady progress with therapy and is pleased with increased participation in daily and leisure activities. Pt had mild fatigue this session 2/2 cooking all day PTA.     Plan and Recommendations  con't w/POC x1 more week & D/C to HEP           OBJECTIVE DATA TAKEN TODAY    None Taken    Today's Treatment:    SHOULDER OT FLOW SHEET    EXERCISES CURRENT SESSION TIME   SELF-CARE TOTAL TIME FOR SESSION Not Performed    education/proper positioning  02/28-discussed proper sleeping/sitting positions w/use of pillows & gave HO                   THER EX TOTAL TIME FOR SESSION 38-52 MInutes    PROM RUE/BUE  04/22-PROM B shoulder      ER/scap retraction S  04/15-ER/scap retraction S 3x15 sec H     UBE  04/22-L 3, 3 min F, 3 min B     pulleys      dowel stretches BUE                                    FF                          Abd                           Ext                          ER    04/22- FF 3x15 sec H   04/22- Abd 3x15 sec H   04/22- Ext 3x15 sec H     ER doorway  04/22- ER doorway S  BUE 3x15 sec  H     pendulum      IR Stretch  04/22- IR strap S 3x15 sec H     Post cap S  04/22- post cap S B UE 3x15 sec H      Inferior cap S  04/22- inferior cap S BUE 3x15 sec H     pect S 90/90 04/22- pect S 90/90  3x15 sec H    Bicep/chest S  03/26- bicep/chest S 3x15 sec H     wrist flexor stretch  03/26-added wrist flexor S for L in sup          Lat flex      Lev scap           DB exercises BUE   FF   scaption   Abd   Bicep curls   Forearm    press up   Mexican press    04/22-FF R 4#2x10, L 4#2x10   04/22-scaption R 4# 2x10, L 4#2x10   04/22-Abd R 3# 2x10, L 3#2x10   04/22-bicep curls 4#2x10     04/22- press up ^4# 2x10   04/22-Slovenian press ^4# 2x10    Ball on wall ex  Punches  Circles, reverse  Alphabet writing  02/17-added ball on wall ex today BUE   02/17-added punches BUE 10xs   02/17-added circles & reverse BUE 10xs      Wall push ups   table/plinth push ups   mat push ups  03/30- added wall push ups today 10xs   04/15-plinth push ups 2x10          Cable column  04/08- rows 3 pl 2x10          MB exercises    2#PNF diagonals BUE & B directions 10xs each way   04/22     4# press up & out BUE 10xs     4#Ball pass behind back R>L, L>R 10xs each way     4# trampoline throw 20xs     2# single arm circles & reverse BUE 10xs                TB BUE           flex          Abd          scaption          IR          ER(neutral)          ER @90 deg          scap retraction          B Ext     02/12- FF red 2x10   02/12- Abd yellow 2x10   02/12- scaption yellow 2x10   04/22- IR BUE blue 2x10   04/22- ER(neutral) BUE blue 2x10   04/22 -ER @90 deg BUE green 2x10   04/22- Scap retr blue 2x10   04/22- B Ext blue 2x10     TB  tricep          Lat pull downs          B horiz abd          Diagonals          tricep  04/22-tricep blue 2x10   04/22-lat pull downs blue 2x10   04/22- B horiz abd ^blue 2x10   04/22- PNF Diagonals green 2x10 B directions BUE   04/22 -tricep pull behind head BUE green 2x10         DB prone rows                    Ext                   horiz abd  03/16-added rows today BUE w/2#2x10   03/16-added ext today BUE w/1#2x10   03/16-added horiz abd BUE w/1#2x10       DB supine punch ups                       Triceps                       FF  04/06-punch ups 3#2x10   03/23-tricep 2#1x10      DB side lying IR                          ER      sleeper S           RA ROM BUE  04/01-RA AROM BUE-see flow sheet     RA str BUE  04/01-RA BUE str-see flow sheet     PSS  (IE 67/100)  04/01- PSS ^to 85/100    MODALITIES TOTAL TIME FOR SESSION 08-22 Minutes     MH B shoulder  04/22- MH B shoulders x 8 min      ice B shoulder  04/22- ice B shoulders x 5 min                    THER ACT TOTAL TIME FOR SESSION  Not Performed

## 2021-04-22 NOTE — OP OT TREATMENT LOG
SHOULDER OT FLOW SHEET    EXERCISES CURRENT SESSION TIME   SELF-CARE TOTAL TIME FOR SESSION Not Performed    education/proper positioning  02/28-discussed proper sleeping/sitting positions w/use of pillows & gave HO                   THER EX TOTAL TIME FOR SESSION 38-52 MInutes    PROM RUE/BUE  04/22-PROM B shoulder      ER/scap retraction S  04/15-ER/scap retraction S 3x15 sec H     UBE  04/22-L 3, 3 min F, 3 min B     pulleys      dowel stretches BUE                                    FF                          Abd                           Ext                          ER    04/22- FF 3x15 sec H   04/22- Abd 3x15 sec H   04/22- Ext 3x15 sec H     ER doorway  04/22- ER doorway S  BUE 3x15 sec H     pendulum      IR Stretch  04/22- IR strap S 3x15 sec H     Post cap S  04/22- post cap S B UE 3x15 sec H      Inferior cap S  04/22- inferior cap S BUE 3x15 sec H     pect S 90/90  04/22- pect S 90/90  3x15 sec H    Bicep/chest S  03/26- bicep/chest S 3x15 sec H     wrist flexor stretch  03/26-added wrist flexor S for L in sup          Lat flex      Lev scap           DB exercises BUE   FF   scaption   Abd   Bicep curls   Forearm    press up   Paraguayan press    04/22-FF R 4#2x10, L 4#2x10   04/22-scaption R 4# 2x10, L 4#2x10   04/22-Abd R 3# 2x10, L 3#2x10   04/22-bicep curls 4#2x10     04/22- press up ^4# 2x10   04/22-Slovak press ^4# 2x10    Ball on wall ex  Punches  Circles, reverse  Alphabet writing  02/17-added ball on wall ex today BUE   02/17-added punches BUE 10xs   02/17-added circles & reverse BUE 10xs      Wall push ups   table/plinth push ups   mat push ups  03/30- added wall push ups today 10xs   04/15-plinth push ups 2x10          Cable column  04/08- rows 3 pl 2x10          MB exercises    2#PNF diagonals BUE & B directions 10xs each way   04/22     4# press up & out BUE 10xs     4#Ball pass behind back R>L, L>R 10xs each way     4# trampoline throw 20xs     2# single arm circles & reverse  BUE 10xs                TB BUE           flex          Abd          scaption          IR          ER(neutral)          ER @90 deg          scap retraction          B Ext     02/12- FF red 2x10   02/12- Abd yellow 2x10   02/12- scaption yellow 2x10   04/22- IR BUE blue 2x10   04/22- ER(neutral) BUE blue 2x10   04/22 -ER @90 deg BUE green 2x10   04/22- Scap retr blue 2x10   04/22- B Ext blue 2x10     TB  tricep          Lat pull downs          B horiz abd          Diagonals          tricep  04/22-tricep blue 2x10   04/22-lat pull downs blue 2x10   04/22- B horiz abd ^blue 2x10   04/22- PNF Diagonals green 2x10 B directions BUE   04/22 -tricep pull behind head BUE green 2x10         DB prone rows                   Ext                   horiz abd  03/16-added rows today BUE w/2#2x10   03/16-added ext today BUE w/1#2x10   03/16-added horiz abd BUE w/1#2x10       DB supine punch ups                       Triceps                       FF  04/06-punch ups 3#2x10   03/23-tricep 2#1x10      DB side lying IR                          ER      sleeper S           RA ROM BUE  04/01-RA AROM BUE-see flow sheet     RA str BUE  04/01-RA BUE str-see flow sheet     PSS  (IE 67/100)  04/01- PSS ^to 85/100    MODALITIES TOTAL TIME FOR SESSION 08-22 Minutes     MH B shoulder  04/22- MH B shoulders x 8 min      ice B shoulder  04/22- ice B shoulders x 5 min                    THER ACT TOTAL TIME FOR SESSION  Not Performed

## 2021-04-27 ENCOUNTER — HOSPITAL ENCOUNTER (OUTPATIENT)
Dept: OCCUPATIONAL THERAPY | Age: 71
Setting detail: THERAPIES SERIES
Discharge: HOME | End: 2021-04-27
Attending: NURSE PRACTITIONER
Payer: MEDICARE

## 2021-04-27 DIAGNOSIS — M75.51 BILATERAL SHOULDER BURSITIS: Primary | ICD-10-CM

## 2021-04-27 DIAGNOSIS — M75.52 BILATERAL SHOULDER BURSITIS: Primary | ICD-10-CM

## 2021-04-27 PROCEDURE — 97110 THERAPEUTIC EXERCISES: CPT | Mod: GO,KX

## 2021-04-27 PROCEDURE — 97010 HOT OR COLD PACKS THERAPY: CPT | Mod: GO,KX

## 2021-04-27 NOTE — PROGRESS NOTES
OT DAILY NOTE FOR OUTPATIENT THERAPY    Patient: Muriel Franklin   MRN: [id number]  : 1950 71 y.o.  Referring Physician: Isadora Rebolledo CR*  Date of Visit: 2021      Certification Dates: 21 through 21    Diagnosis:   1. Bilateral shoulder bursitis        Chief Complaints:   Dec ROM, Dec Strength, Pain, Self Care Difficulties, Decreased recreational/play activity    Precautions:  Existing Precautions/Restrictions: no known precautions/restrictions    TODAY'S VISIT    History/Vitals/Pain/Encounter Info - 21 1314        Injury History/Precautions/Daily Required Info    Document Type  daily treatment     Primary Therapist  Pinky Arnett, OTR/L     Chief Complaint/Reason for Visit   Dec ROM, Dec Strength, Pain, Self Care Difficulties, Decreased recreational/play activity     Referring Physician  Dr Potter     Existing Precautions/Restrictions  no known precautions/restrictions     Pertinent History of Current Functional Problem  Pt reports pain L upper arm (bicep/tricep/mid delt area) on & off for ~1 year then ~9 months ago, intermittent pain started in R shoulder/upper arm which has become constant w/in the past 6 months or so, pt notes R shoulder pain>L shoulder but L upper arm pain>R upper arm pain, pt was going to gym 3x week & due to COVID has been doing work out on [employer] w/, she was using 10#DB over head & has decreased weight to 4# or no weight but is still having pain, also notes she started Yoga a few weeks ago, she had 6 month f-u appt w/PCP on 21, discussed shoulder issues, XRays were ordered which reveaed (-)fx or dislocation but moderate DJD B shoulders & pt was referred to OT, pt notes resting pain  2/10, w/use 3/10, ^to 6-7/10 at worst, asael when lifting overhead pending weight & w/sleeping     Patient/Family/Caregiver Comments/Observations  Pt notes she played golf yesterday w/o any pain & no c/o fatigue which she did report last week after  golfing     Start Time  1305     Stop Time  1405     Time Calculation (min)  60 min     Patient reported fall since last visit  No        Pain Assessment    Currently in pain  No/Denies        Pain Interventions    Intervention   MH B shoulders for warm up per pt request, TE     Post Intervention Comments  no pain         Daily Treatment Assessment and Plan - 04/27/21 1418        Daily Treatment Assessment and Plan    Progress toward goals  Progressing     Daily Outcome Summary  RA BUE strength & ROM for D/C. Nice improvements noted, strength should continue to improve if pt remains compliant w/HEP. PSS ^to 92/100 (improved 25 points since IE). Pt was able to ^resistance w/TB by 1 level, to blue for PNF diagonals & ^DB weight by 1# to 4# for Abd w/good tolerance. Pt brought in her HEP & had a few questions w/some of the exercises which were reviewed w/her & updated her program.     Plan and Recommendations  con't w/POC x 1 more session to ensure I w/HEP & D/C           OBJECTIVE DATA TAKEN TODAY    Range of Motion    PROM/AROM - 04/27/21 1300        RIGHT: Upper Extremity PROM Assessment    Shoulder Flexion Deficit  --    WNL    Shoulder Abduction Deficit  --    WNL    Shoulder Internal Rotation Deficit  --    WNL    Shoulder External Rotation Deficit  --    WNL       RIGHT: Upper Extremity AROM Assessment    Shoulder Flexion Deficit  --    WNL    Shoulder Abduction Deficit  --    WNL    Shoulder Internal Rotation Deficit  --    WNL    Shoulder External Rotation Deficit  --    WNL       LEFT: Upper Extremity AROM Assessment    Shoulder Flexion Deficit  --    WNL    Shoulder Abduction Deficit  --    WNL    Shoulder Internal Rotation Deficit  --    WNL    Shoulder External Rotation Deficit  --    WNL        MMT    Manual Muscle Tests - 04/27/21 1352        RIGHT: Upper Extremity Manual Muscle Test Assessment    Shoulder Flexion gross movement  (4+/5) good plus     Shoulder Abduction gross movement  (4+/5) good plus      Shoulder Internal Rotation gross movement  (5/5) normal     Shoulder External Rotation gross movement  (5/5) normal     Elbow Flex gross movement  (5/5) normal     Elbow Extension gross movement  (5/5) normal     Forearm Supination gross movement  (5/5) normal     Forearm Pronation gross movement  (5/5) normal     Wrist Flexion gross movement  (4+/5) good plus     Wrist Extension gross movement  (5/5) normal        LEFT: Upper Extremity Manual Muscle Test Assessment    Shoulder Flexion gross movement  (4-/5) good minus      Shoulder Abduction gross movement  (4+/5) good plus     Shoulder Internal Rotation gross movement  --    4<>4+/5    Shoulder External Rotation gross movement  (4/5) good     Elbow Flexion gross movement  (4/5) good     Elbow Extension gross movement  (4/5) good     Forearm Supination gross movement  (4+/5) good plus     Forearm Pronation gross movement  (4+/5) good plus     Wrist Flexion gross movement  (4/5) good     Wrist Extension gross movement  (4+/5) good plus         BADL/IADL   BADL/IADL - 04/27/21 1318        BADL Interventions Assessment    Upper Body Dressing  Independent     Upper body dressing comments  still having min pain w/donning coat,  no pain (^from min at times) donning bra     Lower Body Dressing  Independent     Lower body dressing comments  no pain (^from min) pulling up pants     Bathing  Independent     Bathing comments  no pain washing shoulders & or back (^from min pain at times)     Toileting  Independent     Toileting comments  no pain     Grooming comments  no pain (^from min) holding hair dryer     Eating comments  no issues        IADL/Home Interventions Assessment    Driving and community mobility comments  no pain driving     Home management/maintenance comments  no pain now w/housework, able to carry light grocery bags     Meal prep / clean up comments  no pain reaching into cabinet for pot, but does need  2 hands, no pain lifting 1/2 gal, no pain lifting a  gallon but notes she has to use 2 hands     Other (comments)  PSS ^25 points to 92/100 (^from 67/100 on IE), pt reports no pain now at rest (^from 2/10), w/use 1-2/10, at worst 2/10 (^from 6-7/10)           Today's Treatment:    SHOULDER OT FLOW SHEET    EXERCISES CURRENT SESSION TIME   SELF-CARE TOTAL TIME FOR SESSION Not Performed    education/proper positioning  02/28-discussed proper sleeping/sitting positions w/use of pillows & gave HO                   THER EX TOTAL TIME FOR SESSION 38-52 MInutes    PROM RUE/BUE  04/27-PROM B shoulder      ER/scap retraction S  04/27-ER/scap retraction S 3x15 sec H     UBE  04/22-L 3, 3 min F, 3 min B     pulleys      dowel stretches BUE                                    FF                          Abd                           Ext                          ER    04/27- FF 3x15 sec H   04/27- Abd 3x15 sec H   04/27- Ext 3x15 sec H     ER doorway  04/27- ER doorway S  BUE 3x15 sec H     pendulum      IR Stretch  04/27- IR strap S 3x15 sec H     Post cap S  04/27- post cap S B UE 3x15 sec H      Inferior cap S  04/27- inferior cap S BUE 3x15 sec H     pect S 90/90  04/22- pect S 90/90  3x15 sec H    Bicep/chest S  03/26- bicep/chest S 3x15 sec H     wrist flexor stretch  03/26-added wrist flexor S for L in sup          Lat flex      Lev scap           DB exercises BUE   FF   scaption   Abd   Bicep curls   Forearm    press up   Mauritian press    04/27-FF R 4#2x10, L 4#2x10   04/27-scaption R 4# 2x10, L 4#2x10   04/27-Abd R ^4# 2x10, L ^4#2x10   04/27-bicep curls 4#2x10     04/27- press up 4# 2x10   04/27-Czech press 4# 2x10    Ball on wall ex  Punches  Circles, reverse  Alphabet writing  02/17-added ball on wall ex today BUE   02/17-added punches BUE 10xs   02/17-added circles & reverse BUE 10xs      Wall push ups   table/plinth push ups   mat push ups  03/30- added wall push ups today 10xs   04/27-plinth push ups 2x10          Cable column  04/08- rows 3 pl 2x10           MB exercises       04/27     4# press up & out BUE 10xs     4#Ball pass behind back R>L, L>R 10xs each way     4# trampoline throw 20xs                    TB BUE           flex          Abd          scaption          IR          ER(neutral)          ER @90 deg          scap retraction          B Ext     02/12- FF red 2x10   02/12- Abd yellow 2x10   02/12- scaption yellow 2x10   04/27- IR BUE blue 2x10   04/27- ER(neutral) BUE blue 2x10   04/22 -ER @90 deg BUE green 2x10   04/27- Scap retr blue 2x10   04/27- B Ext blue 2x10     TB  tricep          Lat pull downs          B horiz abd          Diagonals          tricep  04/22-tricep blue 2x10   04/22-lat pull downs blue 2x10   04/27- B horiz abd blue 2x10   04/27- PNF Diagonals ^blue 2x10 B directions BUE   04/22 -tricep pull behind head BUE green 2x10         DB prone rows                   Ext                   horiz abd  03/16-added rows today BUE w/2#2x10   03/16-added ext today BUE w/1#2x10   03/16-added horiz abd BUE w/1#2x10       DB supine punch ups                       Triceps                       FF  04/06-punch ups 3#2x10   03/23-tricep 2#1x10      DB side lying IR                          ER      sleeper S           RA ROM BUE  04/27-RA AROM BUE-see flow sheet     RA str BUE  04/27-RA BUE str-see flow sheet     PSS  (IE 67/100)  04/27- PSS ^to 92/100    MODALITIES TOTAL TIME FOR SESSION 08-22 Minutes     MH B shoulder  04/27- MH B shoulders x 8 min      ice B shoulder  04/22- ice B shoulders x 5 min                    THER ACT TOTAL TIME FOR SESSION  Not Performed                            Goals Addressed                 This Visit's Progress    • OT STGs/LTGs          Short Term Goals Time   Frame Result Comment/Progress   Pt will be Supervision with HEP 2 weeks 04/01-met    Increase P/AROM of RUE by 10 degrees to increase independence and ease with overhead ADLs 6 weeks 04/27-met    Increase strength BUE by ½ grade to increase ease with ADLs,  household activities and  recreational tasks 6 weeks 04/27-met  04/01-partially  met   04/01-RUE-met in all planes except wrist flex  LUE- met in all planes except elbow extension   Increase functional use of BUE to WFL to increase independence and ease with ADLs, household activities and recreational tasks 6 weeks 04/01-met    Decrease pain to 2/10 with use and 5/10 at worst 6 weeks 04/01- met    Decrease PSS to 72%  (IE 67/100) 6 weeks 04/01-met 04/01-met at 85/100     Long Term Goals Time  Frame Result Comment/Progress   Pt will be independent with HEP 12 weeks     Patient will increase P/AROM BUE to WNL to increase independence and ease with overhead ADLs 12 weeks 04/27-met    Increase ability to lift 8lbs with RUE to lift gallon of milk and pour coffee pain free 12 weeks 04/27-met    Increase strength RUE to 5/5 & LUE to 4+/5 to increase independence and ease with ADLs, household activities and  recreational tasks 12 weeks 04/27-  Partially met                      04/01-  partially met 04/27- RUE-met w/shoulder IR/ER, elbow, forearm & wrist extension, improved in all other planes  LUE-met w/shoulder abd, forearm, & wrist ext, improved in all other planes      04/01-RUE-met w/elbow flex/ext   Increase functional use of BUE to WNL pain free 12 weeks     No disability on PSS  12 weeks 04/27-not met-  improved   04/27-Improved to 92/100   Pt will be able to perform work outs including planks pain free 12 weeks 04/27-  partially met 04/27-pt has been performing modified work outs, notes she is able to do yoga & standard planks, unable to perform side planks   Decrease pain in BUE to 0/10 12 weeks 04/27-  Partially met 04/27-met w/resting pain, at worst 2/10

## 2021-04-27 NOTE — OP OT TREATMENT LOG
SHOULDER OT FLOW SHEET    EXERCISES CURRENT SESSION TIME   SELF-CARE TOTAL TIME FOR SESSION Not Performed    education/proper positioning  02/28-discussed proper sleeping/sitting positions w/use of pillows & gave HO                   THER EX TOTAL TIME FOR SESSION 38-52 MInutes    PROM RUE/BUE  04/27-PROM B shoulder      ER/scap retraction S  04/27-ER/scap retraction S 3x15 sec H     UBE  04/22-L 3, 3 min F, 3 min B     pulleys      dowel stretches BUE                                    FF                          Abd                           Ext                          ER    04/27- FF 3x15 sec H   04/27- Abd 3x15 sec H   04/27- Ext 3x15 sec H     ER doorway  04/27- ER doorway S  BUE 3x15 sec H     pendulum      IR Stretch  04/27- IR strap S 3x15 sec H     Post cap S  04/27- post cap S B UE 3x15 sec H      Inferior cap S  04/27- inferior cap S BUE 3x15 sec H     pect S 90/90  04/22- pect S 90/90  3x15 sec H    Bicep/chest S  03/26- bicep/chest S 3x15 sec H     wrist flexor stretch  03/26-added wrist flexor S for L in sup          Lat flex      Lev scap           DB exercises BUE   FF   scaption   Abd   Bicep curls   Forearm    press up   Honduran press    04/27-FF R 4#2x10, L 4#2x10   04/27-scaption R 4# 2x10, L 4#2x10   04/27-Abd R ^4# 2x10, L ^4#2x10   04/27-bicep curls 4#2x10     04/27- press up 4# 2x10   04/27-Ivorian press 4# 2x10    Ball on wall ex  Punches  Circles, reverse  Alphabet writing  02/17-added ball on wall ex today BUE   02/17-added punches BUE 10xs   02/17-added circles & reverse BUE 10xs      Wall push ups   table/plinth push ups   mat push ups  03/30- added wall push ups today 10xs   04/27-plinth push ups 2x10          Cable column  04/08- rows 3 pl 2x10          MB exercises       04/27     4# press up & out BUE 10xs     4#Ball pass behind back R>L, L>R 10xs each way     4# trampoline throw 20xs                    TB BUE           flex          Abd          scaption          IR           ER(neutral)          ER @90 deg          scap retraction          B Ext     02/12- FF red 2x10   02/12- Abd yellow 2x10   02/12- scaption yellow 2x10   04/27- IR BUE blue 2x10   04/27- ER(neutral) BUE blue 2x10   04/22 -ER @90 deg BUE green 2x10   04/27- Scap retr blue 2x10   04/27- B Ext blue 2x10     TB  tricep          Lat pull downs          B horiz abd          Diagonals          tricep  04/22-tricep blue 2x10   04/22-lat pull downs blue 2x10   04/27- B horiz abd blue 2x10   04/27- PNF Diagonals ^blue 2x10 B directions BUE   04/22 -tricep pull behind head BUE green 2x10         DB prone rows                   Ext                   horiz abd  03/16-added rows today BUE w/2#2x10   03/16-added ext today BUE w/1#2x10   03/16-added horiz abd BUE w/1#2x10       DB supine punch ups                       Triceps                       FF  04/06-punch ups 3#2x10   03/23-tricep 2#1x10      DB side lying IR                          ER      sleeper S           RA ROM BUE  04/27-RA AROM BUE-see flow sheet     RA str BUE  04/27-RA BUE str-see flow sheet     PSS  (IE 67/100)  04/27- PSS ^to 92/100    MODALITIES TOTAL TIME FOR SESSION 08-22 Minutes     MH B shoulder  04/27- MH B shoulders x 8 min      ice B shoulder  04/22- ice B shoulders x 5 min                    THER ACT TOTAL TIME FOR SESSION  Not Performed

## 2021-04-29 ENCOUNTER — HOSPITAL ENCOUNTER (OUTPATIENT)
Dept: OCCUPATIONAL THERAPY | Age: 71
Setting detail: THERAPIES SERIES
Discharge: HOME | End: 2021-04-29
Attending: NURSE PRACTITIONER
Payer: MEDICARE

## 2021-04-29 DIAGNOSIS — M75.51 BILATERAL SHOULDER BURSITIS: Primary | ICD-10-CM

## 2021-04-29 DIAGNOSIS — M75.52 BILATERAL SHOULDER BURSITIS: Primary | ICD-10-CM

## 2021-04-29 PROCEDURE — 97110 THERAPEUTIC EXERCISES: CPT | Mod: GO,KX

## 2021-04-29 NOTE — LETTER
[address] [facility] [address]  [phone]  [facility] Fax: [phone]    OCCUPATIONAL THERAPY DISCHARGE    Patient Name:  Muriel Franklin    Provider: Pinky Arnett OT  Referring Provider: Isadora Rebolledo CR*  PCP: Sidra Potter MD  Payor: Payor: MEDICARE / Plan: MEDICARE PART A & B / Product Type: Medicare /   Medical Diagnosis: Bilateral shoulder bursitis [M75.51, M75.52]     Thank you for the referral of your patient Muriel Franklin for occupational therapy. I am writing to you today to make you aware that your patient is being discharged from occupational therapy. Please review the latest progress note below and the goals that have been addressed during this plan of care.     If you have any questions or concerns, please feel free to contact me. Once again, thank you for your referral and the opportunity to work with your patient.     Sincerely,   Pinky Arnett OT    OT DISCHARGE NOTE FOR OUTPATIENT THERAPY    Patient: Muriel Franklin   MRN: [id number]  : 1950 71 y.o.  Referring Physician: Isadora Rebolledo CR*  Date of Visit: 2021      Certification Dates:  21 through 21      Chief Complaints:   Chief Complaint   Patient presents with   • Dec ROM   • Dec Strength   • Pain       Precautions:   Existing Precautions/Restrictions: no known precautions/restrictions    TODAY'S VISIT:    General Information - 21 5187        General Information    Document Type  discharge evaluation     Referring Physician  Dr Potter     Pertinent History of Current Functional Problem  Pt reports pain L upper arm (bicep/tricep/mid delt area) on & off for ~1 year then ~9 months ago, intermittent pain started in R shoulder/upper arm which has become constant w/in the past 6 months or so, pt notes R shoulder pain>L shoulder but L upper arm pain>R upper arm pain, pt was going to gym 3x week & due to COVID has been doing work out on [employer] w/, she  "was using 10#DB over head & has decreased weight to 4# or no weight but is still having pain, also notes she started Yoga a few weeks ago, she had 6 month f-u appt w/PCP on 02/01/21, discussed shoulder issues, XRays were ordered which reveaed (-)fx or dislocation but moderate DJD B shoulders & pt was referred to OT, pt notes resting pain  2/10, w/use 3/10, ^to 6-7/10 at worst, asael when lifting overhead pending weight & w/sleeping     Patient/Family/Caregiver Comments/Observations  Pt notes she had shingles shot on tuesday (04/27) & was sore yesterday in both arms, feeling better today but notes \"I still feel it     Existing Precautions/Restrictions  no known precautions/restrictions        OT Time Calculation    Start Time  1105     Stop Time  1205     Time Calculation (min)  60 min             Daily Treatment Assessment and Plan - 04/29/21 1245        Daily Treatment Assessment and Plan    Progress toward goals  Progressing     Daily Outcome Summary  Started session w/UBE for warm up. Pt was able to ^resistance w/TB  by 1 level for 2 exercises (ER@90 deg & tricep behind head) which was a good challenge for her, updated & reviewed HEP. Pt did well in OT.  She is I w/HEP & is D/C from OT after tx today     Plan and Recommendations  D/C OT           OBJECTIVE MEASUREMENTS/DATA:    Range of Motion          PROM/AROM - 04/27/21 1300                   RIGHT: Upper Extremity PROM Assessment      Shoulder Flexion Deficit  --    WNL      Shoulder Abduction Deficit  --    WNL      Shoulder Internal Rotation Deficit  --    WNL      Shoulder External Rotation Deficit  --    WNL             RIGHT: Upper Extremity AROM Assessment      Shoulder Flexion Deficit  --    WNL      Shoulder Abduction Deficit  --    WNL      Shoulder Internal Rotation Deficit  --    WNL      Shoulder External Rotation Deficit  --    WNL             LEFT: Upper Extremity AROM Assessment      Shoulder Flexion Deficit  --    WNL      Shoulder Abduction " Deficit  --    WNL      Shoulder Internal Rotation Deficit  --    WNL      Shoulder External Rotation Deficit  --    WNL           MMT          Manual Muscle Tests - 04/27/21 1352                   RIGHT: Upper Extremity Manual Muscle Test Assessment      Shoulder Flexion gross movement  (4+/5) good plus       Shoulder Abduction gross movement  (4+/5) good plus       Shoulder Internal Rotation gross movement  (5/5) normal       Shoulder External Rotation gross movement  (5/5) normal       Elbow Flex gross movement  (5/5) normal       Elbow Extension gross movement  (5/5) normal       Forearm Supination gross movement  (5/5) normal       Forearm Pronation gross movement  (5/5) normal       Wrist Flexion gross movement  (4+/5) good plus       Wrist Extension gross movement  (5/5) normal              LEFT: Upper Extremity Manual Muscle Test Assessment      Shoulder Flexion gross movement  (4-/5) good minus        Shoulder Abduction gross movement  (4+/5) good plus       Shoulder Internal Rotation gross movement  --    4<>4+/5      Shoulder External Rotation gross movement  (4/5) good       Elbow Flexion gross movement  (4/5) good       Elbow Extension gross movement  (4/5) good       Forearm Supination gross movement  (4+/5) good plus       Forearm Pronation gross movement  (4+/5) good plus       Wrist Flexion gross movement  (4/5) good       Wrist Extension gross movement  (4+/5) good plus            BADL/IADL         BADL/IADL - 04/27/21 1318                   BADL Interventions Assessment      Upper Body Dressing  Independent       Upper body dressing comments  still having min pain w/donning coat,  no pain (^from min at times) donning bra       Lower Body Dressing  Independent       Lower body dressing comments  no pain (^from min) pulling up pants       Bathing  Independent       Bathing comments  no pain washing shoulders & or back (^from min pain at times)       Toileting  Independent       Toileting comments   no pain       Grooming comments  no pain (^from min) holding hair dryer       Eating comments  no issues              IADL/Home Interventions Assessment      Driving and community mobility comments  no pain driving       Home management/maintenance comments  no pain now w/housework, able to carry light grocery bags       Meal prep / clean up comments  no pain reaching into cabinet for pot, but does need  2 hands, no pain lifting 1/2 gal, no pain lifting a gallon but notes she has to use 2 hands       Other (comments)  PSS ^25 points to 92/100 (^from 67/100 on IE), pt reports no pain now at rest (^from 2/10), w/use 1-2/10, at worst 2/10 (^from 6-7/10)               Today's Treatment:    SHOULDER OT FLOW SHEET    EXERCISES CURRENT SESSION TIME   SELF-CARE TOTAL TIME FOR SESSION Not Performed    education/proper positioning  02/28-discussed proper sleeping/sitting positions w/use of pillows & gave HO                   THER EX TOTAL TIME FOR SESSION 53-67 MInutes    PROM RUE/BUE  04/29-PROM B shoulder      ER/scap retraction S  04/29-ER/scap retraction S 3x15 sec H     UBE  04/29-L 3, 3 min F, 3 min B     pulleys      dowel stretches BUE                                    FF                          Abd                           Ext                          ER    04/29- FF 3x15 sec H   04/29- Abd 3x15 sec H   04/29- Ext 3x15 sec H     ER doorway  04/29- ER doorway S  BUE 3x15 sec H     pendulum      IR Stretch  04/29- IR strap S 3x15 sec H     Post cap S  04/29- post cap S B UE 3x15 sec H      Inferior cap S  04/29- inferior cap S BUE 3x15 sec H     pect S 90/90  04/29- pect S 90/90  3x15 sec H    Bicep/chest S  03/26- bicep/chest S 3x15 sec H     wrist flexor stretch  03/26-added wrist flexor S for L in sup          Lat flex      Lev scap           DB exercises BUE   FF   scaption   Abd   Bicep curls   Forearm    press up   Honduran press    04/29-FF R 4#2x10, L 4#2x10   04/29-scaption R 4# 2x10, L 4#2x10    04/29-Abd R 4# 2x10, L 4#2x10   04/29-bicep curls 4#2x10     04/29- press up 4# 2x10   04/29-Maltese press 4# 2x10    Ball on wall ex  Punches  Circles, reverse  Alphabet writing  02/17-added ball on wall ex today BUE   02/17-added punches BUE 10xs   02/17-added circles & reverse BUE 10xs      Wall push ups   table/plinth push ups   mat push ups  03/30- added wall push ups today 10xs   04/29-plinth push ups 2x10          Cable column  04/08- rows 3 pl 2x10          MB exercises       04/29     4# press up & out BUE 10xs     4#Ball pass behind back R>L, L>R 10xs each way     4# trampoline throw 20xs                    TB BUE           flex          Abd          scaption          IR          ER(neutral)          ER @90 deg          scap retraction          B Ext     02/12- FF red 2x10   02/12- Abd yellow 2x10   02/12- scaption yellow 2x10   04/29- IR BUE blue 2x10   04/29- ER(neutral) BUE blue 2x10   04/29 -ER @90 deg BUE ^blue 2x10   04/29- Scap retr blue 2x10   04/29- B Ext blue 2x10     TB  tricep          Lat pull downs          B horiz abd          Diagonals          tricep  04/29-tricep blue 2x10   04/29-lat pull downs blue 2x10   04/29- B horiz abd blue 2x10   04/29- PNF Diagonals blue 2x10 B directions BUE   04/29 -tricep pull behind head BUE ^blue 2x10         DB prone rows                   Ext                   horiz abd  03/16-added rows today BUE w/2#2x10   03/16-added ext today BUE w/1#2x10   03/16-added horiz abd BUE w/1#2x10       DB supine punch ups                       Triceps                       FF  04/06-punch ups 3#2x10   03/23-tricep 2#1x10      DB side lying IR                          ER      sleeper S           RA ROM BUE  04/27-RA AROM BUE-see flow sheet     RA str BUE  04/27-RA BUE str-see flow sheet     PSS  (IE 67/100)  04/27- PSS ^to 92/100    MODALITIES TOTAL TIME FOR SESSION Not Performed     MH B shoulder  04/27- MH B shoulders x 8 min      ice B shoulder  04/22- ice B  shoulders x 5 min                    THER ACT TOTAL TIME FOR SESSION  Not Performed                              Goals Addressed                 This Visit's Progress    • OT STGs/LTGs          Short Term Goals Time   Frame Result Comment/Progress   Pt will be Supervision with HEP 2 weeks 04/01-met    Increase P/AROM of RUE by 10 degrees to increase independence and ease with overhead ADLs 6 weeks 04/27-met    Increase strength BUE by ½ grade to increase ease with ADLs, household activities and  recreational tasks 6 weeks 04/27-met  04/01-partially  met   04/01-RUE-met in all planes except wrist flex  LUE- met in all planes except elbow extension   Increase functional use of BUE to WFL to increase independence and ease with ADLs, household activities and recreational tasks 6 weeks 04/01-met    Decrease pain to 2/10 with use and 5/10 at worst 6 weeks 04/01- met    Decrease PSS to 72%  (IE 67/100) 6 weeks 04/01-met 04/01-met at 85/100     Long Term Goals Time  Frame Result Comment/Progress   Pt will be independent with HEP 12 weeks 04/29-met    Patient will increase P/AROM BUE to WNL to increase independence and ease with overhead ADLs 12 weeks 04/27-met    Increase ability to lift 8lbs with RUE to lift gallon of milk and pour coffee pain free 12 weeks 04/27-met    Increase strength RUE to 5/5 & LUE to 4+/5 to increase independence and ease with ADLs, household activities and  recreational tasks 12 weeks 04/27-  Partially met                    04/01-  partially met 04/27- RUE-met w/shoulder IR/ER, elbow, forearm & wrist extension, improved in all other planes  LUE-met w/shoulder abd, forearm, & wrist ext, improved in all other planes    04/01-RUE-met w/elbow flex/ext   Increase functional use of BUE to WNL pain free 12 weeks 04/29-met    No disability on PSS  12 weeks 04/27-not met-  improved   04/27-Improved to 92/100   Pt will be able to perform work outs including planks pain free 12 weeks 04/27-  partially met  04/27-pt has been performing modified work outs, notes she is able to do yoga & standard planks, unable to perform side planks   Decrease pain in BUE to 0/10 12 weeks 04/27-  Partially met 04/27-met w/resting pain, at worst 2/10                                                                          1 pair

## 2021-04-29 NOTE — OP OT TREATMENT LOG
SHOULDER OT FLOW SHEET    EXERCISES CURRENT SESSION TIME   SELF-CARE TOTAL TIME FOR SESSION Not Performed    education/proper positioning  02/28-discussed proper sleeping/sitting positions w/use of pillows & gave HO                   THER EX TOTAL TIME FOR SESSION 53-67 MInutes    PROM RUE/BUE  04/29-PROM B shoulder      ER/scap retraction S  04/29-ER/scap retraction S 3x15 sec H     UBE  04/29-L 3, 3 min F, 3 min B     pulleys      dowel stretches BUE                                    FF                          Abd                           Ext                          ER    04/29- FF 3x15 sec H   04/29- Abd 3x15 sec H   04/29- Ext 3x15 sec H     ER doorway  04/29- ER doorway S  BUE 3x15 sec H     pendulum      IR Stretch  04/29- IR strap S 3x15 sec H     Post cap S  04/29- post cap S B UE 3x15 sec H      Inferior cap S  04/29- inferior cap S BUE 3x15 sec H     pect S 90/90  04/29- pect S 90/90  3x15 sec H    Bicep/chest S  03/26- bicep/chest S 3x15 sec H     wrist flexor stretch  03/26-added wrist flexor S for L in sup          Lat flex      Lev scap           DB exercises BUE   FF   scaption   Abd   Bicep curls   Forearm    press up   Swiss press    04/29-FF R 4#2x10, L 4#2x10   04/29-scaption R 4# 2x10, L 4#2x10   04/29-Abd R 4# 2x10, L 4#2x10   04/29-bicep curls 4#2x10     04/29- press up 4# 2x10   04/29-Filipino press 4# 2x10    Ball on wall ex  Punches  Circles, reverse  Alphabet writing  02/17-added ball on wall ex today BUE   02/17-added punches BUE 10xs   02/17-added circles & reverse BUE 10xs      Wall push ups   table/plinth push ups   mat push ups  03/30- added wall push ups today 10xs   04/29-plinth push ups 2x10          Cable column  04/08- rows 3 pl 2x10          MB exercises       04/29     4# press up & out BUE 10xs     4#Ball pass behind back R>L, L>R 10xs each way     4# trampoline throw 20xs                    TB BUE           flex          Abd          scaption          IR           ER(neutral)          ER @90 deg          scap retraction          B Ext     02/12- FF red 2x10   02/12- Abd yellow 2x10   02/12- scaption yellow 2x10   04/29- IR BUE blue 2x10   04/29- ER(neutral) BUE blue 2x10   04/29 -ER @90 deg BUE ^blue 2x10   04/29- Scap retr blue 2x10   04/29- B Ext blue 2x10     TB  tricep          Lat pull downs          B horiz abd          Diagonals          tricep  04/29-tricep blue 2x10   04/29-lat pull downs blue 2x10   04/29- B horiz abd blue 2x10   04/29- PNF Diagonals blue 2x10 B directions BUE   04/29 -tricep pull behind head BUE ^blue 2x10         DB prone rows                   Ext                   horiz abd  03/16-added rows today BUE w/2#2x10   03/16-added ext today BUE w/1#2x10   03/16-added horiz abd BUE w/1#2x10       DB supine punch ups                       Triceps                       FF  04/06-punch ups 3#2x10   03/23-tricep 2#1x10      DB side lying IR                          ER      sleeper S           RA ROM BUE  04/27-RA AROM BUE-see flow sheet     RA str BUE  04/27-RA BUE str-see flow sheet     PSS  (IE 67/100)  04/27- PSS ^to 92/100    MODALITIES TOTAL TIME FOR SESSION Not Performed     MH B shoulder  04/27- MH B shoulders x 8 min      ice B shoulder  04/22- ice B shoulders x 5 min                    THER ACT TOTAL TIME FOR SESSION  Not Performed

## 2021-04-29 NOTE — PROGRESS NOTES
"OT DISCHARGE NOTE FOR OUTPATIENT THERAPY    Patient: Muriel Franklin   MRN: [id number]  : 1950 71 y.o.  Referring Physician: Isadora Rebolledo CR*  Date of Visit: 2021      Certification Dates:  21 through 21      Chief Complaints:   Chief Complaint   Patient presents with   • Dec ROM   • Dec Strength   • Pain       Precautions:   Existing Precautions/Restrictions: no known precautions/restrictions    TODAY'S VISIT:    General Information - 21 1255        General Information    Document Type  discharge evaluation     Referring Physician  Dr Potter     Pertinent History of Current Functional Problem  Pt reports pain L upper arm (bicep/tricep/mid delt area) on & off for ~1 year then ~9 months ago, intermittent pain started in R shoulder/upper arm which has become constant w/in the past 6 months or so, pt notes R shoulder pain>L shoulder but L upper arm pain>R upper arm pain, pt was going to gym 3x week & due to COVID has been doing work out on [employer] w/, she was using 10#DB over head & has decreased weight to 4# or no weight but is still having pain, also notes she started Yoga a few weeks ago, she had 6 month f-u appt w/PCP on 21, discussed shoulder issues, XRays were ordered which reveaed (-)fx or dislocation but moderate DJD B shoulders & pt was referred to OT, pt notes resting pain  2/10, w/use 3/10, ^to 6-7/10 at worst, asael when lifting overhead pending weight & w/sleeping     Patient/Family/Caregiver Comments/Observations  Pt notes she had shingles shot on tuesday () & was sore yesterday in both arms, feeling better today but notes \"I still feel it     Existing Precautions/Restrictions  no known precautions/restrictions        OT Time Calculation    Start Time  1105     Stop Time  1205     Time Calculation (min)  60 min             Daily Treatment Assessment and Plan - 21 1245        Daily Treatment Assessment and Plan    Progress toward goals  " Progressing     Daily Outcome Summary  Started session w/UBE for warm up. Pt was able to ^resistance w/TB  by 1 level for 2 exercises (ER@90 deg & tricep behind head) which was a good challenge for her, updated & reviewed HEP. Pt did well in OT.  She is I w/HEP & is D/C from OT after tx today     Plan and Recommendations  D/C OT           OBJECTIVE MEASUREMENTS/DATA:    Range of Motion          PROM/AROM - 04/27/21 1300                   RIGHT: Upper Extremity PROM Assessment      Shoulder Flexion Deficit  --    WNL      Shoulder Abduction Deficit  --    WNL      Shoulder Internal Rotation Deficit  --    WNL      Shoulder External Rotation Deficit  --    WNL             RIGHT: Upper Extremity AROM Assessment      Shoulder Flexion Deficit  --    WNL      Shoulder Abduction Deficit  --    WNL      Shoulder Internal Rotation Deficit  --    WNL      Shoulder External Rotation Deficit  --    WNL             LEFT: Upper Extremity AROM Assessment      Shoulder Flexion Deficit  --    WNL      Shoulder Abduction Deficit  --    WNL      Shoulder Internal Rotation Deficit  --    WNL      Shoulder External Rotation Deficit  --    WNL           MMT          Manual Muscle Tests - 04/27/21 1352                   RIGHT: Upper Extremity Manual Muscle Test Assessment      Shoulder Flexion gross movement  (4+/5) good plus       Shoulder Abduction gross movement  (4+/5) good plus       Shoulder Internal Rotation gross movement  (5/5) normal       Shoulder External Rotation gross movement  (5/5) normal       Elbow Flex gross movement  (5/5) normal       Elbow Extension gross movement  (5/5) normal       Forearm Supination gross movement  (5/5) normal       Forearm Pronation gross movement  (5/5) normal       Wrist Flexion gross movement  (4+/5) good plus       Wrist Extension gross movement  (5/5) normal              LEFT: Upper Extremity Manual Muscle Test Assessment      Shoulder Flexion gross movement  (4-/5) good minus         Shoulder Abduction gross movement  (4+/5) good plus       Shoulder Internal Rotation gross movement  --    4<>4+/5      Shoulder External Rotation gross movement  (4/5) good       Elbow Flexion gross movement  (4/5) good       Elbow Extension gross movement  (4/5) good       Forearm Supination gross movement  (4+/5) good plus       Forearm Pronation gross movement  (4+/5) good plus       Wrist Flexion gross movement  (4/5) good       Wrist Extension gross movement  (4+/5) good plus            BADL/IADL         BADL/IADL - 04/27/21 1318                   BADL Interventions Assessment      Upper Body Dressing  Independent       Upper body dressing comments  still having min pain w/donning coat,  no pain (^from min at times) donning bra       Lower Body Dressing  Independent       Lower body dressing comments  no pain (^from min) pulling up pants       Bathing  Independent       Bathing comments  no pain washing shoulders & or back (^from min pain at times)       Toileting  Independent       Toileting comments  no pain       Grooming comments  no pain (^from min) holding hair dryer       Eating comments  no issues              IADL/Home Interventions Assessment      Driving and community mobility comments  no pain driving       Home management/maintenance comments  no pain now w/housework, able to carry light grocery bags       Meal prep / clean up comments  no pain reaching into cabinet for pot, but does need  2 hands, no pain lifting 1/2 gal, no pain lifting a gallon but notes she has to use 2 hands       Other (comments)  PSS ^25 points to 92/100 (^from 67/100 on IE), pt reports no pain now at rest (^from 2/10), w/use 1-2/10, at worst 2/10 (^from 6-7/10)               Today's Treatment:    SHOULDER OT FLOW SHEET    EXERCISES CURRENT SESSION TIME   SELF-CARE TOTAL TIME FOR SESSION Not Performed    education/proper positioning  02/28-discussed proper sleeping/sitting positions w/use of pillows & gave HO                    THER EX TOTAL TIME FOR SESSION 53-67 MInutes    PROM RUE/BUE  04/29-PROM B shoulder      ER/scap retraction S  04/29-ER/scap retraction S 3x15 sec H     UBE  04/29-L 3, 3 min F, 3 min B     pulleys      dowel stretches BUE                                    FF                          Abd                           Ext                          ER    04/29- FF 3x15 sec H   04/29- Abd 3x15 sec H   04/29- Ext 3x15 sec H     ER doorway  04/29- ER doorway S  BUE 3x15 sec H     pendulum      IR Stretch  04/29- IR strap S 3x15 sec H     Post cap S  04/29- post cap S B UE 3x15 sec H      Inferior cap S  04/29- inferior cap S BUE 3x15 sec H     pect S 90/90  04/29- pect S 90/90  3x15 sec H    Bicep/chest S  03/26- bicep/chest S 3x15 sec H     wrist flexor stretch  03/26-added wrist flexor S for L in sup          Lat flex      Lev scap           DB exercises BUE   FF   scaption   Abd   Bicep curls   Forearm    press up   Maldivian press    04/29-FF R 4#2x10, L 4#2x10   04/29-scaption R 4# 2x10, L 4#2x10   04/29-Abd R 4# 2x10, L 4#2x10   04/29-bicep curls 4#2x10     04/29- press up 4# 2x10   04/29-Turkmen press 4# 2x10    Ball on wall ex  Punches  Circles, reverse  Alphabet writing  02/17-added ball on wall ex today BUE   02/17-added punches BUE 10xs   02/17-added circles & reverse BUE 10xs      Wall push ups   table/plinth push ups   mat push ups  03/30- added wall push ups today 10xs   04/29-plinth push ups 2x10          Cable column  04/08- rows 3 pl 2x10          MB exercises       04/29     4# press up & out BUE 10xs     4#Ball pass behind back R>L, L>R 10xs each way     4# trampoline throw 20xs                    TB BUE           flex          Abd          scaption          IR          ER(neutral)          ER @90 deg          scap retraction          B Ext     02/12- FF red 2x10   02/12- Abd yellow 2x10   02/12- scaption yellow 2x10   04/29- IR BUE blue 2x10   04/29- ER(neutral) BUE blue 2x10   04/29 -ER  @90 deg BUE ^blue 2x10   04/29- Scap retr blue 2x10   04/29- B Ext blue 2x10     TB  tricep          Lat pull downs          B horiz abd          Diagonals          tricep  04/29-tricep blue 2x10   04/29-lat pull downs blue 2x10   04/29- B horiz abd blue 2x10   04/29- PNF Diagonals blue 2x10 B directions BUE   04/29 -tricep pull behind head BUE ^blue 2x10         DB prone rows                   Ext                   horiz abd  03/16-added rows today BUE w/2#2x10   03/16-added ext today BUE w/1#2x10   03/16-added horiz abd BUE w/1#2x10       DB supine punch ups                       Triceps                       FF  04/06-punch ups 3#2x10   03/23-tricep 2#1x10      DB side lying IR                          ER      sleeper S           RA ROM BUE  04/27-RA AROM BUE-see flow sheet     RA str BUE  04/27-RA BUE str-see flow sheet     PSS  (IE 67/100)  04/27- PSS ^to 92/100    MODALITIES TOTAL TIME FOR SESSION Not Performed     MH B shoulder  04/27- MH B shoulders x 8 min      ice B shoulder  04/22- ice B shoulders x 5 min                    THER ACT TOTAL TIME FOR SESSION  Not Performed                              Goals Addressed                 This Visit's Progress    • OT STGs/LTGs          Short Term Goals Time   Frame Result Comment/Progress   Pt will be Supervision with HEP 2 weeks 04/01-met    Increase P/AROM of RUE by 10 degrees to increase independence and ease with overhead ADLs 6 weeks 04/27-met    Increase strength BUE by ½ grade to increase ease with ADLs, household activities and  recreational tasks 6 weeks 04/27-met  04/01-partially  met   04/01-RUE-met in all planes except wrist flex  LUE- met in all planes except elbow extension   Increase functional use of BUE to WFL to increase independence and ease with ADLs, household activities and recreational tasks 6 weeks 04/01-met    Decrease pain to 2/10 with use and 5/10 at worst 6 weeks 04/01- met    Decrease PSS to 72%  (IE 67/100) 6 weeks 04/01-met  04/01-met at 85/100     Long Term Goals Time  Frame Result Comment/Progress   Pt will be independent with HEP 12 weeks 04/29-met    Patient will increase P/AROM BUE to WNL to increase independence and ease with overhead ADLs 12 weeks 04/27-met    Increase ability to lift 8lbs with RUE to lift gallon of milk and pour coffee pain free 12 weeks 04/27-met    Increase strength RUE to 5/5 & LUE to 4+/5 to increase independence and ease with ADLs, household activities and  recreational tasks 12 weeks 04/27-  Partially met                    04/01-  partially met 04/27- RUE-met w/shoulder IR/ER, elbow, forearm & wrist extension, improved in all other planes  LUE-met w/shoulder abd, forearm, & wrist ext, improved in all other planes    04/01-RUE-met w/elbow flex/ext   Increase functional use of BUE to WNL pain free 12 weeks 04/29-met    No disability on PSS  12 weeks 04/27-not met-  improved   04/27-Improved to 92/100   Pt will be able to perform work outs including planks pain free 12 weeks 04/27-  partially met 04/27-pt has been performing modified work outs, notes she is able to do yoga & standard planks, unable to perform side planks   Decrease pain in BUE to 0/10 12 weeks 04/27-  Partially met 04/27-met w/resting pain, at worst 2/10

## 2021-07-06 ENCOUNTER — OFFICE VISIT (OUTPATIENT)
Dept: PRIMARY CARE | Facility: CLINIC | Age: 71
End: 2021-07-06
Payer: MEDICARE

## 2021-07-06 ENCOUNTER — TELEPHONE (OUTPATIENT)
Dept: PRIMARY CARE | Facility: CLINIC | Age: 71
End: 2021-07-06

## 2021-07-06 VITALS
OXYGEN SATURATION: 98 % | HEART RATE: 78 BPM | TEMPERATURE: 97.3 F | BODY MASS INDEX: 25.72 KG/M2 | DIASTOLIC BLOOD PRESSURE: 68 MMHG | SYSTOLIC BLOOD PRESSURE: 110 MMHG | WEIGHT: 132.8 LBS | RESPIRATION RATE: 15 BRPM

## 2021-07-06 DIAGNOSIS — M54.50 ACUTE RIGHT-SIDED LOW BACK PAIN WITHOUT SCIATICA: Primary | ICD-10-CM

## 2021-07-06 DIAGNOSIS — K21.9 GASTRO-ESOPHAGEAL REFLUX DISEASE WITHOUT ESOPHAGITIS: ICD-10-CM

## 2021-07-06 DIAGNOSIS — K59.09 CHRONIC CONSTIPATION: ICD-10-CM

## 2021-07-06 DIAGNOSIS — D32.9 MENINGIOMA (CMS/HCC): ICD-10-CM

## 2021-07-06 DIAGNOSIS — R42 DIZZINESS AND GIDDINESS: ICD-10-CM

## 2021-07-06 DIAGNOSIS — D69.6 THROMBOCYTOPENIA (CMS/HCC): ICD-10-CM

## 2021-07-06 DIAGNOSIS — C78.7 SECONDARY MALIGNANT NEOPLASM OF LIVER (CMS/HCC): ICD-10-CM

## 2021-07-06 DIAGNOSIS — C82.00 FOLLICULAR LYMPHOMA GRADE I, UNSPECIFIED BODY REGION (CMS/HCC): ICD-10-CM

## 2021-07-06 DIAGNOSIS — C85.98: ICD-10-CM

## 2021-07-06 DIAGNOSIS — R35.0 URINARY FREQUENCY: ICD-10-CM

## 2021-07-06 PROBLEM — C85.99: Status: RESOLVED | Noted: 2019-04-03 | Resolved: 2021-07-06

## 2021-07-06 PROBLEM — K52.1 DRUG-INDUCED DIARRHEA: Status: RESOLVED | Noted: 2019-04-03 | Resolved: 2021-07-06

## 2021-07-06 PROBLEM — C78.6: Status: RESOLVED | Noted: 2019-02-06 | Resolved: 2021-07-06

## 2021-07-06 PROBLEM — C82.98: Status: RESOLVED | Noted: 2019-04-03 | Resolved: 2021-07-06

## 2021-07-06 LAB
BILIRUBIN, POC: NEGATIVE
BLOOD URINE, POC: NEGATIVE
CLARITY, POC: CLEAR
COLOR, POC: YELLOW
GLUCOSE URINE, POC: NEGATIVE
KETONES, POC: NEGATIVE
LEUKOCYTE EST, POC: NEGATIVE
NITRITE, POC: NEGATIVE
PH, POC: 6
PROTEIN, POC: NEGATIVE
SPECIFIC GRAVITY, POC: 1.02
UROBILINOGEN, POC: 0.2

## 2021-07-06 PROCEDURE — 81002 URINALYSIS NONAUTO W/O SCOPE: CPT | Performed by: FAMILY MEDICINE

## 2021-07-06 PROCEDURE — 99214 OFFICE O/P EST MOD 30 MIN: CPT | Performed by: FAMILY MEDICINE

## 2021-07-06 PROCEDURE — G8754 DIAS BP LESS 90: HCPCS | Performed by: FAMILY MEDICINE

## 2021-07-06 PROCEDURE — G8752 SYS BP LESS 140: HCPCS | Performed by: FAMILY MEDICINE

## 2021-07-06 RX ORDER — MAGNESIUM CHLORIDE 64 MG
TABLET, DELAYED RELEASE (ENTERIC COATED) ORAL DAILY
COMMUNITY
End: 2023-05-15 | Stop reason: ENTERED-IN-ERROR

## 2021-07-06 RX ORDER — COLLAGEN, HYDROLYSATE (BOVINE) 100 %
POWDER (GRAM) MISCELLANEOUS AS NEEDED
COMMUNITY
End: 2021-12-07

## 2021-07-06 NOTE — TELEPHONE ENCOUNTER
Diziness and Nausea  in the Morning after eating goes away after a few hours but this has been going on for a  few weeks, Oncologist  suggested to follow up with PC in regards to it. She is not sure if she needs  To see Dr. Potter about it if she  Should wait to see her oncologist on 7.12.21. she is not sure what to do if she needs more labs completed when she goes to see her oncologist. She needs advice

## 2021-07-06 NOTE — PROGRESS NOTES
Subjective      Patient ID: Muriel Franklin is a 71 y.o. female.      HPI    The following have been reviewed and updated as appropriate in this visit:  Allergies  Meds  Problems          Pt with hx of follicular lymphoma ; htn ; high chol tia  Some plaque mild CAD per cath 2014;  IBS -   Chronic constipation      Presents for acute visit    Was on her boat over weekend   Thursday started with low back pain on right - no injury     A little bit of nausea  Not persistent not throwing up   No abd pain not like vertigo ( reprots has had this in the past )     She has chronic GI issue shas seen GI had upper and lower endo and multiple CT abd p- no pathology GI dx with hronic functgional constipation   No IBD no celiac   Has tried multiple agents for constipation generallhy too strong but does use laxative suppositories and miralax occasionally       Follicular lymphoma has had onc followup Dr Hendrickson at [facility]     Taking the boat to [place]  In few weeks gone for 6 weeks      Takes magnesium and miralax  Bladder spasm       Did PT for shoulders  Feeling better    meningioma in past has nto had MRI in past Dr Hendrickson    Review of Systems      Current Outpatient Medications:   •  acyclovir (ZOVIRAX) 400 mg tablet, , Disp: , Rfl:   •  aspirin 81 mg chewable tablet, Take 81 mg by mouth daily., Disp: , Rfl:   •  cholecalciferol, vitamin D3, 1,000 unit capsule, Take 1,000 Units by mouth as needed.  , Disp: , Rfl:   •  coenzyme Q10 (COQ10) 100 mg capsule, Take 100 mg by mouth 2 (two) times a day. , Disp: , Rfl:   •  collagen, hydrolysate, bovine, (collagen, hydr, bovine,, bulk,) 100 % powder, daily., Disp: , Rfl:   •  Lactobac no.41/Bifidobact no.7 (PROBIOTIC-10 ORAL), Take by mouth., Disp: , Rfl:   •  magnesium chloride 64 mg tablet,delayed release (DR/EC), Take by mouth daily., Disp: , Rfl:   •  multivitamin tablet, Take by mouth daily., Disp: , Rfl:   •  rosuvastatin (CRESTOR) 20 mg tablet, Take 1 tablet (20 mg total) by  mouth daily., Disp: 90 tablet, Rfl: 0  •  topiramate (TOPAMAX) 25 mg tablet, Take 1 tablet (25 mg total) by mouth once daily., Disp: 90 tablet, Rfl: 0  •  tretinoin (RETIN-A) 0.1 % cream, APPLY DAILY AT BEDTIME TO FACE, Disp: , Rfl:   •  turmeric root extract 500 mg capsule, Take by mouth daily., Disp: , Rfl:     Past Medical History:   Diagnosis Date   • Hypertension    • Lipid disorder    • Lymphoma, non-Hodgkin's (CMS/HCC)    • 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    Physical Exam  Constitutional:       Appearance: She is well-developed and normal weight.   HENT:      Head: Normocephalic and atraumatic.   Neck:      Thyroid: No thyromegaly.   Cardiovascular:      Rate and Rhythm: Normal rate and regular rhythm.      Heart sounds: Normal heart sounds. No murmur heard.   No friction rub. No gallop.    Pulmonary:      Effort: Pulmonary effort is normal. No respiratory distress.      Breath sounds: Normal breath sounds. No wheezing or rales.   Musculoskeletal:      Cervical back: Normal range of motion and neck supple.      Right lower leg: No edema.      Left lower leg: No edema.   Lymphadenopathy:      Cervical: No cervical adenopathy.   Neurological:      Mental Status: She is alert and oriented to person, place, and time.   Psychiatric:         Behavior: Behavior normal.         Thought Content: Thought content normal.         Judgment: Judgment normal.         Assessment/Plan   Problem List Items Addressed This Visit         Nervous    Acute right-sided low back pain without sciatica - Primary    Meningioma (CMS/HCC)       Digestive    Gastro-esophageal reflux disease without esophagitis    Secondary malignant neoplasm of liver (CMS/HCC)    Chronic constipation    Relevant Orders    CBC and differential    Comprehensive metabolic panel    TSH w reflex FT4       Genitourinary    Urinary frequency    Relevant Orders    CBC and differential    Comprehensive metabolic panel    TSH w reflex FT4    POCT urinalysis dipstick (Completed)    Urine culture (clean catch)       Hematologic    Follicular lymphoma (CMS/HCC)    Non-Hodgkin's lymphoma of multiple sites (CMS/HCC)    Thrombocytopenia (CMS/HCC)       Other    Dizziness and giddiness    Relevant Orders    CBC and differential    Comprehensive metabolic panel    TSH w reflex FT4      disc multiple complaints  Back [place] x past few days no injury suspect muscular injury - take nsaid but if sx persist call will need further eval     GI issues nausea constipation are chronic and has ahd GI evla disc IBS chronic c onstipation needs to stay on miralax and GI regimen to keeps sx under control     Disc bladder sx some frequency has resolved now ; urine benign dip  Neg leuk neg nitrite will send out for culture but doubt UTI as sx have improved     Dizziness - not clear cause ?  vertigo : hypoglycemia;     She used to see neurologist - hx of meningioma is due followup with dw her oncologist to order mri brain   rec seeing neurologist     Hx of TIA also rec getting head imaging as above but   If any new neuro sx go to ER for urgent imaging     Finally dis cboat trip ( GI sx lao seem to act up when she is on the boat   - dis if this could be related to stress anxiety a bout being on the boat or with motion sickness.     She is following up with her oncologist ths month will be getting labs and imaging for lymphoma and allso fro meningioma     MAW due august   Patient Instructions   Take aleve for back  "pain     Disc IBS issues       Patient Education     Diet for Irritable Bowel Syndrome  When you have irritable bowel syndrome (IBS), it is very important to eat the foods and follow the eating habits that are best for your condition. IBS may cause various symptoms such as pain in the abdomen, constipation, or diarrhea. Choosing the right foods can help to ease the discomfort from these symptoms. Work with your health care provider and diet and nutrition specialist (dietitian) to find the eating plan that will help to control your symptoms.  What are tips for following this plan?         · Keep a food diary. This will help you identify foods that cause symptoms. Write down:  ? What you eat and when you eat it.  ? What symptoms you have.  ? When symptoms occur in relation to your meals, such as \"pain in abdomen 2 hours after dinner.\"  · Eat your meals slowly and in a relaxed setting.  · Aim to eat 5-6 small meals per day. Do not skip meals.  · Drink enough fluid to keep your urine pale yellow.  · Ask your health care provider if you should take an over-the-counter probiotic to help restore healthy bacteria in your gut (digestive tract).  ? Probiotics are foods that contain good bacteria and yeasts.  · Your dietitian may have specific dietary recommendations for you based on your symptoms. He or she may recommend that you:  ? Avoid foods that cause symptoms. Talk with your dietitian about other ways to get the same nutrients that are in those problem foods.  ? Avoid foods with gluten. Gluten is a protein that is found in rye, wheat, and barley.  ? Eat more foods that contain soluble fiber. Examples of foods with high soluble fiber include oats, seeds, and certain fruits and vegetables. Take a fiber supplement if directed by your dietitian.  ? Reduce or avoid certain foods called FODMAPs. These are foods that contain carbohydrates that are hard to digest. Ask your doctor which foods contain these carbohydrates.  What " foods are not recommended?  The following are some foods and drinks that may make your symptoms worse:  · Fatty foods, such as french fries.  · Foods that contain gluten, such as pasta and cereal.  · Dairy products, such as milk, cheese, and ice cream.  · Chocolate.  · Alcohol.  · Products with caffeine, such as coffee.  · Carbonated drinks, such as soda.  · Foods that are high in FODMAPs. These include certain fruits and vegetables.  · Products with sweeteners such as honey, high fructose corn syrup, sorbitol, and mannitol.  The items listed above may not be a complete list of foods and beverages you should avoid. Contact a dietitian for more information.  What foods are good sources of fiber?  Your health care provider or dietitian may recommend that you eat more foods that contain fiber. Fiber can help to reduce constipation and other IBS symptoms. Add foods with fiber to your diet a little at a time so your body can get used to them. Too much fiber at one time might cause gas and swelling of your abdomen. The following are some foods that are good sources of fiber:  · Berries, such as raspberries, strawberries, and blueberries.  · Tomatoes.  · Carrots.  · Brown rice.  · Oats.  · Seeds, such as kelly and pumpkin seeds.  The items listed above may not be a complete list of recommended sources of fiber. Contact your dietitian for more options.  Where to find more information  · International Foundation for Functional Gastrointestinal Disorders: www.iffgd.org  · National Terral of Diabetes and Digestive and Kidney Diseases: www.niddk.nih.gov  Summary  · When you have irritable bowel syndrome (IBS), it is very important to eat the foods and follow the eating habits that are best for your condition.  · IBS may cause various symptoms such as pain in the abdomen, constipation, or diarrhea.  · Choosing the right foods can help to ease the discomfort that comes from symptoms.  · Keep a food diary. This will help you  identify foods that cause symptoms.  · Your health care provider or diet and nutrition specialist (dietitian) may recommend that you eat more foods that contain fiber.  This information is not intended to replace advice given to you by your health care provider. Make sure you discuss any questions you have with your health care provider.  Document Released: 03/09/2005 Document Revised: 04/08/2020 Document Reviewed: 08/21/2018  Elsevier Patient Education © 2020 [employer]       Patient Education     Irritable Bowel Syndrome, Adult    Irritable bowel syndrome (IBS) is a group of symptoms that affects the organs responsible for digestion (gastrointestinal or GI tract). IBS is not one specific disease.  To regulate how the GI tract works, the body sends signals back and forth between the intestines and the brain. If you have IBS, there may be a problem with these signals. As a result, the GI tract does not function normally. The intestines may become more sensitive and overreact to certain things. This may be especially true when you eat certain foods or when you are under stress.  There are four types of IBS. These may be determined based on the consistency of your stool (feces):  · IBS with diarrhea.  · IBS with constipation.  · Mixed IBS.  · Unsubtyped IBS.  It is important to know which type of IBS you have. Certain treatments are more likely to be helpful for certain types of IBS.  What are the causes?  The exact cause of IBS is not known.  What increases the risk?  You may have a higher risk for IBS if you:  · Are female.  · Are younger than 40.  · Have a family history of IBS.  · Have a mental health condition, such as depression, anxiety, or post-traumatic stress disorder.  · Have had a bacterial infection of your GI tract.  What are the signs or symptoms?  Symptoms of IBS vary from person to person. The main symptom is abdominal pain or discomfort. Other symptoms usually include one or more of the  following:  · Diarrhea, constipation, or both.  · Abdominal swelling or bloating.  · Feeling full after eating a small or regular-sized meal.  · Frequent gas.  · Mucus in the stool.  · A feeling of having more stool left after a bowel movement.  Symptoms tend to come and go. They may be triggered by stress, mental health conditions, or certain foods.  How is this diagnosed?  This condition may be diagnosed based on a physical exam, your medical history, and your symptoms. You may have tests, such as:  · Blood tests.  · Stool test.  · X-rays.  · CT scan.  · Colonoscopy. This is a procedure in which your GI tract is viewed with a long, thin, flexible tube.  How is this treated?  There is no cure for IBS, but treatment can help relieve symptoms. Treatment depends on the type of IBS you have, and may include:  · Changes to your diet, such as:  ? Avoiding foods that cause symptoms.  ? Drinking more water.  ? Following a low-FODMAP (fermentable oligosaccharides, disaccharides, monosaccharides, and polyols) diet for up to 6 weeks, or as told by your health care provider. FODMAPs are sugars that are hard for some people to digest.  ? Eating more fiber.  ? Eating medium-sized meals at the same times every day.  · Medicines. These may include:  ? Fiber supplements, if you have constipation.  ? Medicine to control diarrhea (antidiarrheal medicines).  ? Medicine to help control muscle tightening (spasms) in your GI tract (antispasmodic medicines).  ? Medicines to help with mental health conditions, such as antidepressants or tranquilizers.  · Talk therapy or counseling.  · Working with a diet and nutrition specialist (dietitian) to help create a food plan that is right for you.  · Managing your stress.  Follow these instructions at home:  Eating and drinking  · Eat a healthy diet.  · Eat medium-sized meals at about the same time every day. Do not eat large meals.  · Gradually eat more fiber-rich foods. These include whole  grains, fruits, and vegetables. This may be especially helpful if you have IBS with constipation.  · Eat a diet low in FODMAPs.  · Drink enough fluid to keep your urine pale yellow.  · Keep a journal of foods that seem to trigger symptoms.  · Avoid foods and drinks that:  ? Contain added sugar.  ? Make your symptoms worse. Dairy products, caffeinated drinks, and carbonated drinks can make symptoms worse for some people.  General instructions  · Take over-the-counter and prescription medicines and supplements only as told by your health care provider.  · Get enough exercise. Do at least 150 minutes of moderate-intensity exercise each week.  · Manage your stress. Getting enough sleep and exercise can help you manage stress.  · Keep all follow-up visits as told by your health care provider and therapist. This is important.  Alcohol Use  · Do not drink alcohol if:  ? Your health care provider tells you not to drink.  ? You are pregnant, may be pregnant, or are planning to become pregnant.  · If you drink alcohol, limit how much you have:  ? 0-1 drink a day for women.  ? 0-2 drinks a day for men.  · Be aware of how much alcohol is in your drink. In the [place] one drink equals one typical bottle of beer (12 oz), one-half glass of wine (5 oz), or one shot of hard liquor (1½ oz).  Contact a health care provider if you have:  · Constant pain.  · Weight loss.  · Difficulty or pain when swallowing.  · Diarrhea that gets worse.  Get help right away if you have:  · Severe abdominal pain.  · Fever.  · Diarrhea with symptoms of dehydration, such as dizziness or dry mouth.  · Bright red blood in your stool.  · Stool that is black and tarry.  · Abdominal swelling.  · Vomiting that does not stop.  · Blood in your vomit.  Summary  · Irritable bowel syndrome (IBS) is not one specific disease. It is a group of symptoms that affects digestion.  · Your intestines may become more sensitive and overreact to certain things. This may be  especially true when you eat certain foods or when you are under stress.  · There is no cure for IBS, but treatment can help relieve symptoms.  This information is not intended to replace advice given to you by your health care provider. Make sure you discuss any questions you have with your health care provider.  Document Released: 12/18/2006 Document Revised: 12/11/2018 Document Reviewed: 12/11/2018  Elsevier Patient Education © 2020 [employer]           Sidra Potter MD

## 2021-07-06 NOTE — PATIENT INSTRUCTIONS
"Take aleve for back pain     Disc IBS issues       Patient Education     Diet for Irritable Bowel Syndrome  When you have irritable bowel syndrome (IBS), it is very important to eat the foods and follow the eating habits that are best for your condition. IBS may cause various symptoms such as pain in the abdomen, constipation, or diarrhea. Choosing the right foods can help to ease the discomfort from these symptoms. Work with your health care provider and diet and nutrition specialist (dietitian) to find the eating plan that will help to control your symptoms.  What are tips for following this plan?         · Keep a food diary. This will help you identify foods that cause symptoms. Write down:  ? What you eat and when you eat it.  ? What symptoms you have.  ? When symptoms occur in relation to your meals, such as \"pain in abdomen 2 hours after dinner.\"  · Eat your meals slowly and in a relaxed setting.  · Aim to eat 5-6 small meals per day. Do not skip meals.  · Drink enough fluid to keep your urine pale yellow.  · Ask your health care provider if you should take an over-the-counter probiotic to help restore healthy bacteria in your gut (digestive tract).  ? Probiotics are foods that contain good bacteria and yeasts.  · Your dietitian may have specific dietary recommendations for you based on your symptoms. He or she may recommend that you:  ? Avoid foods that cause symptoms. Talk with your dietitian about other ways to get the same nutrients that are in those problem foods.  ? Avoid foods with gluten. Gluten is a protein that is found in rye, wheat, and barley.  ? Eat more foods that contain soluble fiber. Examples of foods with high soluble fiber include oats, seeds, and certain fruits and vegetables. Take a fiber supplement if directed by your dietitian.  ? Reduce or avoid certain foods called FODMAPs. These are foods that contain carbohydrates that are hard to digest. Ask your doctor which foods contain these " carbohydrates.  What foods are not recommended?  The following are some foods and drinks that may make your symptoms worse:  · Fatty foods, such as french fries.  · Foods that contain gluten, such as pasta and cereal.  · Dairy products, such as milk, cheese, and ice cream.  · Chocolate.  · Alcohol.  · Products with caffeine, such as coffee.  · Carbonated drinks, such as soda.  · Foods that are high in FODMAPs. These include certain fruits and vegetables.  · Products with sweeteners such as honey, high fructose corn syrup, sorbitol, and mannitol.  The items listed above may not be a complete list of foods and beverages you should avoid. Contact a dietitian for more information.  What foods are good sources of fiber?  Your health care provider or dietitian may recommend that you eat more foods that contain fiber. Fiber can help to reduce constipation and other IBS symptoms. Add foods with fiber to your diet a little at a time so your body can get used to them. Too much fiber at one time might cause gas and swelling of your abdomen. The following are some foods that are good sources of fiber:  · Berries, such as raspberries, strawberries, and blueberries.  · Tomatoes.  · Carrots.  · Brown rice.  · Oats.  · Seeds, such as kelly and pumpkin seeds.  The items listed above may not be a complete list of recommended sources of fiber. Contact your dietitian for more options.  Where to find more information  · International Foundation for Functional Gastrointestinal Disorders: www.iffgd.org  · National [place] of Diabetes and Digestive and Kidney Diseases: www.niddk.nih.gov  Summary  · When you have irritable bowel syndrome (IBS), it is very important to eat the foods and follow the eating habits that are best for your condition.  · IBS may cause various symptoms such as pain in the abdomen, constipation, or diarrhea.  · Choosing the right foods can help to ease the discomfort that comes from symptoms.  · Keep a food diary.  This will help you identify foods that cause symptoms.  · Your health care provider or diet and nutrition specialist (dietitian) may recommend that you eat more foods that contain fiber.  This information is not intended to replace advice given to you by your health care provider. Make sure you discuss any questions you have with your health care provider.  Document Released: 03/09/2005 Document Revised: 04/08/2020 Document Reviewed: 08/21/2018  [email or web address] Patient Education © 2020 [employer]       Patient Education     Irritable Bowel Syndrome, Adult    Irritable bowel syndrome (IBS) is a group of symptoms that affects the organs responsible for digestion (gastrointestinal or GI tract). IBS is not one specific disease.  To regulate how the GI tract works, the body sends signals back and forth between the intestines and the brain. If you have IBS, there may be a problem with these signals. As a result, the GI tract does not function normally. The intestines may become more sensitive and overreact to certain things. This may be especially true when you eat certain foods or when you are under stress.  There are four types of IBS. These may be determined based on the consistency of your stool (feces):  · IBS with diarrhea.  · IBS with constipation.  · Mixed IBS.  · Unsubtyped IBS.  It is important to know which type of IBS you have. Certain treatments are more likely to be helpful for certain types of IBS.  What are the causes?  The exact cause of IBS is not known.  What increases the risk?  You may have a higher risk for IBS if you:  · Are female.  · Are younger than 40.  · Have a family history of IBS.  · Have a mental health condition, such as depression, anxiety, or post-traumatic stress disorder.  · Have had a bacterial infection of your GI tract.  What are the signs or symptoms?  Symptoms of IBS vary from person to person. The main symptom is abdominal pain or discomfort. Other symptoms usually include one or  more of the following:  · Diarrhea, constipation, or both.  · Abdominal swelling or bloating.  · Feeling full after eating a small or regular-sized meal.  · Frequent gas.  · Mucus in the stool.  · A feeling of having more stool left after a bowel movement.  Symptoms tend to come and go. They may be triggered by stress, mental health conditions, or certain foods.  How is this diagnosed?  This condition may be diagnosed based on a physical exam, your medical history, and your symptoms. You may have tests, such as:  · Blood tests.  · Stool test.  · X-rays.  · CT scan.  · Colonoscopy. This is a procedure in which your GI tract is viewed with a long, thin, flexible tube.  How is this treated?  There is no cure for IBS, but treatment can help relieve symptoms. Treatment depends on the type of IBS you have, and may include:  · Changes to your diet, such as:  ? Avoiding foods that cause symptoms.  ? Drinking more water.  ? Following a low-FODMAP (fermentable oligosaccharides, disaccharides, monosaccharides, and polyols) diet for up to 6 weeks, or as told by your health care provider. FODMAPs are sugars that are hard for some people to digest.  ? Eating more fiber.  ? Eating medium-sized meals at the same times every day.  · Medicines. These may include:  ? Fiber supplements, if you have constipation.  ? Medicine to control diarrhea (antidiarrheal medicines).  ? Medicine to help control muscle tightening (spasms) in your GI tract (antispasmodic medicines).  ? Medicines to help with mental health conditions, such as antidepressants or tranquilizers.  · Talk therapy or counseling.  · Working with a diet and nutrition specialist (dietitian) to help create a food plan that is right for you.  · Managing your stress.  Follow these instructions at home:  Eating and drinking  · Eat a healthy diet.  · Eat medium-sized meals at about the same time every day. Do not eat large meals.  · Gradually eat more fiber-rich foods. These include  whole grains, fruits, and vegetables. This may be especially helpful if you have IBS with constipation.  · Eat a diet low in FODMAPs.  · Drink enough fluid to keep your urine pale yellow.  · Keep a journal of foods that seem to trigger symptoms.  · Avoid foods and drinks that:  ? Contain added sugar.  ? Make your symptoms worse. Dairy products, caffeinated drinks, and carbonated drinks can make symptoms worse for some people.  General instructions  · Take over-the-counter and prescription medicines and supplements only as told by your health care provider.  · Get enough exercise. Do at least 150 minutes of moderate-intensity exercise each week.  · Manage your stress. Getting enough sleep and exercise can help you manage stress.  · Keep all follow-up visits as told by your health care provider and therapist. This is important.  Alcohol Use  · Do not drink alcohol if:  ? Your health care provider tells you not to drink.  ? You are pregnant, may be pregnant, or are planning to become pregnant.  · If you drink alcohol, limit how much you have:  ? 0-1 drink a day for women.  ? 0-2 drinks a day for men.  · Be aware of how much alcohol is in your drink. In the [place] one drink equals one typical bottle of beer (12 oz), one-half glass of wine (5 oz), or one shot of hard liquor (1½ oz).  Contact a health care provider if you have:  · Constant pain.  · Weight loss.  · Difficulty or pain when swallowing.  · Diarrhea that gets worse.  Get help right away if you have:  · Severe abdominal pain.  · Fever.  · Diarrhea with symptoms of dehydration, such as dizziness or dry mouth.  · Bright red blood in your stool.  · Stool that is black and tarry.  · Abdominal swelling.  · Vomiting that does not stop.  · Blood in your vomit.  Summary  · Irritable bowel syndrome (IBS) is not one specific disease. It is a group of symptoms that affects digestion.  · Your intestines may become more sensitive and overreact to certain things. This may be  especially true when you eat certain foods or when you are under stress.  · There is no cure for IBS, but treatment can help relieve symptoms.  This information is not intended to replace advice given to you by your health care provider. Make sure you discuss any questions you have with your health care provider.  Document Released: 12/18/2006 Document Revised: 12/11/2018 Document Reviewed: 12/11/2018  [employer] Patient Education © 2020 [employer]

## 2021-07-08 LAB — BACTERIA UR CULT: NORMAL

## 2021-08-19 RX ORDER — TOPIRAMATE 25 MG/1
TABLET ORAL
Qty: 90 TABLET | Refills: 0 | Status: SHIPPED | OUTPATIENT
Start: 2021-08-19 | End: 2022-01-11

## 2021-08-25 ENCOUNTER — DOCUMENTATION (OUTPATIENT)
Dept: PRIMARY CARE | Facility: CLINIC | Age: 71
End: 2021-08-25

## 2021-08-25 NOTE — PROGRESS NOTES
Coding Clarification ([facility]) - Formerly [facility]  Note       DATE: 2021    RE: Muriel Franklin  : 1950      Under the direction of Sidra Potter MD, the following adjustments were made to this patients Problem List:      Specified Code: C82.19 Code Description: Follicular lymphoma grade II, extranodal and solid organ sites   Clarification Type EMR System Encounter Type Date of Service Provider   Code Specificity Epic Progress Note  2019 BRIANDA GIRALDO   Rationale: Claims data indicate Dr. BRIANDA GIRALDO submitted C82.19 in association with clinical care rendered on DOS 2019. No Clinical data available for review by .  Higher specificity for C82.90 from Problem List is available to be C82.19.         Original Code: C82.90

## 2021-09-07 ENCOUNTER — TRANSCRIBE ORDERS (OUTPATIENT)
Dept: SCHEDULING | Age: 71
End: 2021-09-07
Payer: MEDICARE

## 2021-09-07 DIAGNOSIS — Z12.31 ENCOUNTER FOR SCREENING MAMMOGRAM FOR MALIGNANT NEOPLASM OF BREAST: Primary | ICD-10-CM

## 2021-09-09 ENCOUNTER — HOSPITAL ENCOUNTER (OUTPATIENT)
Dept: RADIOLOGY | Age: 71
Discharge: HOME | End: 2021-09-09
Attending: OBSTETRICS & GYNECOLOGY
Payer: MEDICARE

## 2021-09-09 DIAGNOSIS — Z12.31 ENCOUNTER FOR SCREENING MAMMOGRAM FOR MALIGNANT NEOPLASM OF BREAST: ICD-10-CM

## 2021-09-09 LAB
ALBUMIN SERPL-MCNC: 4.5 G/DL (ref 3.6–5.1)
ALBUMIN/GLOB SERPL: 2.1 (CALC) (ref 1–2.5)
ALP SERPL-CCNC: 71 U/L (ref 37–153)
ALT SERPL-CCNC: 25 U/L (ref 6–29)
AST SERPL-CCNC: 31 U/L (ref 10–35)
BASOPHILS # BLD AUTO: 50 CELLS/UL (ref 0–200)
BASOPHILS NFR BLD AUTO: 1.1 %
BILIRUB SERPL-MCNC: 0.7 MG/DL (ref 0.2–1.2)
BUN SERPL-MCNC: 11 MG/DL (ref 7–25)
BUN/CREAT SERPL: NORMAL (CALC) (ref 6–22)
CALCIUM SERPL-MCNC: 9.7 MG/DL (ref 8.6–10.4)
CHLORIDE SERPL-SCNC: 106 MMOL/L (ref 98–110)
CO2 SERPL-SCNC: 25 MMOL/L (ref 20–32)
CREAT SERPL-MCNC: 0.75 MG/DL (ref 0.6–0.93)
EOSINOPHIL # BLD AUTO: 72 CELLS/UL (ref 15–500)
EOSINOPHIL NFR BLD AUTO: 1.6 %
ERYTHROCYTE [DISTWIDTH] IN BLOOD BY AUTOMATED COUNT: 12.7 % (ref 11–15)
GLOBULIN SER CALC-MCNC: 2.1 G/DL (CALC) (ref 1.9–3.7)
GLUCOSE SERPL-MCNC: 82 MG/DL (ref 65–99)
HCT VFR BLD AUTO: 43.4 % (ref 35–45)
HGB BLD-MCNC: 14.7 G/DL (ref 11.7–15.5)
LYMPHOCYTES # BLD AUTO: 693 CELLS/UL (ref 850–3900)
LYMPHOCYTES NFR BLD AUTO: 15.4 %
MCH RBC QN AUTO: 32 PG (ref 27–33)
MCHC RBC AUTO-ENTMCNC: 33.9 G/DL (ref 32–36)
MCV RBC AUTO: 94.3 FL (ref 80–100)
MONOCYTES # BLD AUTO: 612 CELLS/UL (ref 200–950)
MONOCYTES NFR BLD AUTO: 13.6 %
NEUTROPHILS # BLD AUTO: 3074 CELLS/UL (ref 1500–7800)
NEUTROPHILS NFR BLD AUTO: 68.3 %
PLATELET # BLD AUTO: 146 THOUSAND/UL (ref 140–400)
PMV BLD REES-ECKER: 12.2 FL (ref 7.5–12.5)
POTASSIUM SERPL-SCNC: 4.2 MMOL/L (ref 3.5–5.3)
PROT SERPL-MCNC: 6.6 G/DL (ref 6.1–8.1)
QUEST EGFR AFRICAN AMERICAN: 92 ML/MIN/1.73M2
QUEST EGFR NON-AFR. AMERICAN: 80 ML/MIN/1.73M2
RBC # BLD AUTO: 4.6 MILLION/UL (ref 3.8–5.1)
SODIUM SERPL-SCNC: 140 MMOL/L (ref 135–146)
T4 FREE SERPL-MCNC: 0.9 NG/DL (ref 0.8–1.8)
TSH SERPL-ACNC: 6.03 MIU/L (ref 0.4–4.5)
WBC # BLD AUTO: 4.5 THOUSAND/UL (ref 3.8–10.8)

## 2021-09-09 PROCEDURE — 77067 SCR MAMMO BI INCL CAD: CPT

## 2021-09-15 ENCOUNTER — OFFICE VISIT (OUTPATIENT)
Dept: PRIMARY CARE | Facility: CLINIC | Age: 71
End: 2021-09-15
Payer: MEDICARE

## 2021-09-15 VITALS
HEART RATE: 78 BPM | OXYGEN SATURATION: 98 % | RESPIRATION RATE: 14 BRPM | TEMPERATURE: 98.1 F | SYSTOLIC BLOOD PRESSURE: 104 MMHG | DIASTOLIC BLOOD PRESSURE: 70 MMHG | WEIGHT: 134.4 LBS | BODY MASS INDEX: 26.03 KG/M2

## 2021-09-15 DIAGNOSIS — K59.09 CHRONIC CONSTIPATION: ICD-10-CM

## 2021-09-15 DIAGNOSIS — M85.89 OSTEOPENIA OF MULTIPLE SITES: ICD-10-CM

## 2021-09-15 DIAGNOSIS — Z00.00 MEDICARE ANNUAL WELLNESS VISIT, SUBSEQUENT: Primary | ICD-10-CM

## 2021-09-15 DIAGNOSIS — E03.8 SUBCLINICAL HYPOTHYROIDISM: ICD-10-CM

## 2021-09-15 DIAGNOSIS — C82.19 FOLLICULAR LYMPHOMA GRADE II, EXTRANODAL AND SOLID ORGAN SITES: ICD-10-CM

## 2021-09-15 DIAGNOSIS — I10 ESSENTIAL HYPERTENSION: ICD-10-CM

## 2021-09-15 DIAGNOSIS — D32.9 MENINGIOMA (CMS/HCC): ICD-10-CM

## 2021-09-15 DIAGNOSIS — R82.90 ABNORMAL URINE ODOR: ICD-10-CM

## 2021-09-15 DIAGNOSIS — K21.9 GASTRO-ESOPHAGEAL REFLUX DISEASE WITHOUT ESOPHAGITIS: ICD-10-CM

## 2021-09-15 DIAGNOSIS — C85.98: ICD-10-CM

## 2021-09-15 DIAGNOSIS — E78.2 MIXED HYPERLIPIDEMIA: ICD-10-CM

## 2021-09-15 DIAGNOSIS — R79.89 ELEVATED LFTS: ICD-10-CM

## 2021-09-15 PROBLEM — K52.9 GASTROENTERITIS: Status: RESOLVED | Noted: 2019-04-03 | Resolved: 2021-09-15

## 2021-09-15 PROBLEM — B02.9 SHINGLES: Status: RESOLVED | Noted: 2019-05-03 | Resolved: 2021-09-15

## 2021-09-15 PROCEDURE — 99213 OFFICE O/P EST LOW 20 MIN: CPT | Mod: 25 | Performed by: FAMILY MEDICINE

## 2021-09-15 PROCEDURE — G0439 PPPS, SUBSEQ VISIT: HCPCS | Performed by: FAMILY MEDICINE

## 2021-09-15 PROCEDURE — G8754 DIAS BP LESS 90: HCPCS | Performed by: FAMILY MEDICINE

## 2021-09-15 PROCEDURE — G8752 SYS BP LESS 140: HCPCS | Performed by: FAMILY MEDICINE

## 2021-09-15 RX ORDER — EZETIMIBE 10 MG/1
10 TABLET ORAL NIGHTLY
Qty: 90 TABLET | Refills: 0 | Status: SHIPPED | OUTPATIENT
Start: 2021-09-15 | End: 2022-04-28

## 2021-09-15 RX ORDER — EZETIMIBE 10 MG/1
TABLET ORAL
COMMUNITY
Start: 2021-09-13 | End: 2021-09-15

## 2021-09-15 ASSESSMENT — MINI COG
TOTAL SCORE: 5
COMPLETED: YES
COMPLETED: YES
TOTAL SCORE: 5

## 2021-09-15 ASSESSMENT — PATIENT HEALTH QUESTIONNAIRE - PHQ9: SUM OF ALL RESPONSES TO PHQ9 QUESTIONS 1 & 2: 0

## 2021-09-15 NOTE — PATIENT INSTRUCTIONS
dexa     Repeat thyroid labs    Zetia per cardio                           Your Personalized Prevention Plan Services (PPPS)    Preventive Services Checklist (Assumes Average Risk Unless Otherwise Noted):    Health Maintenance Topics with due status: Overdue       Topic Date Due    Zoster Vaccine 06/22/2021    Medicare Annual Wellness Visit 08/03/2021    DEXA Scan 08/29/2021     Health Maintenance Topics with due status: Not Due       Topic Last Completion Date    Colonoscopy 11/23/2015    DTaP, Tdap, and Td Vaccines 02/01/2016    Mammogram 09/09/2021     Health Maintenance Topics with due status: Completed       Topic Last Completion Date    Hepatitis C Screening 06/20/2019    Pneumococcal 08/26/2019    Pneumococcal (65 years and older) 08/26/2019    Annual Falls Risk Screening 02/08/2021    COVID-19 Vaccine 02/21/2021    Influenza Vaccine 09/08/2021     Health Maintenance Topics with due status: Aged Out       Topic Date Due    Meningococcal ACWY Aged Out    HIB Vaccines Aged Out    IPV Vaccines Aged Out    HPV Vaccines Aged Out       You May Be Eligible for These Additional Preventive Services   (Assumes Average Risk Unless Otherwise Noted)  Diabetes Screening Any 1 risk factor: hypertension, dyslipidemia, obesity, high glucose; or Any 2 risk factors: >=64yo, overweight, family history diabetes (covered every 6 months)   Hepatitis C Screening Any 1 risk factor: 1) blood transfusion before 1992,   2) current or past injection drug use (annually for high risk; if born between 3882-6824, see above for status).   Vaccine: Hepatitis B As necessary if at-risk: hemophilia, ESRD, diabetes, living with individual infected with hep B, healthcare worker with frequent contact with blood/bodily fluids (series covered once)   Sexually Transmitted Diseases (STDs) As necessary chlamydia, gonorrhea, syphilis, hepatitis B (covered annually)  HIV if any 1 risk factor present: 1) <14yo or >64yo and at increased risk or 2) 15-64yo  and ask for it (covered annually)   Lung Cancer Screening Low dose chest CT if all 3 risk factors: 1) 55-78yo, 2) smoker or quit within last 15y, 3) >=30 pack years (covered annually).  No results found for this or any previous visit.       Cholesterol Screening Both risk factors: 1) >=19yo and 2)  increased risk coronary artery disease (covered every 5 years).     Breast Cancer Screening Covered once 35-40yo, annually >=41yo (if >=51yo, see above for status).         Health Risk Factors with Personalized Education:  ----------------------------------------------------------------------------------------------------------------------  Controlling Your Blood Pressure  · Maintain a normal weight (body mass index between 18.5 and 24.9).  · Eat more fruit, vegetables and low-fat dairy.  · Eat less saturated fat and total fat.  · Lower your sodium (salt) intake.  Try to stay under 1500 mg per day, but if you cannot get your intake to be that low, at least lower it by 1000 mg.  · Stay active.  Try to get at least 90 to 150 minutes of exercise per week.  Try brisk walking, swimming, bicycling or dancing.  · Limit alcohol intake.  When you do consume alcohol, drink no more than 1 drink per day.  · If you have been prescribed medication, take it regularly and exactly as prescribed.  Let your PCP know if you have any problems or questions about your medication.  · Check your blood pressure at home or at the store.  Write down your readings and share them with your PCP  ----------------------------------------------------------------------------------------------------------------------  Controlling Your Cholesterol  · Reduce the amount of saturated and trans fat in your diet.  Limit intake of red meat.  Consume only low-fat or non-fat/skim dairy.  Limit fried food.  Cook with vegetable oils.  · Reduce your intake of sugary foods, sugary drinks and alcohol.  · Eat a diet high in fruit, vegetables and whole grains.  · Get  protein from fish, poultry and a small portion of nuts.  · Stay active.  Try to get at least 90 to 150 minutes of exercise per week.  Try brisk walking, swimming, bicycling or dancing.  · Maintain a healthy weight by balancing your diet and exercise.  · If you have been prescribed medication, take it regularly and exactly as prescribed. Let your PCP know if you have any problems or questions about your medication.  · It’s important to know your cholesterol numbers.  When recommended by your PCP, get the cholesterol blood test.  ----------------------------------------------------------------------------------------------------------------------  Controlling Your Osteopenia, Strengthening Your Bones  · Try to get at least 90 to 150 minutes of weight-bearing exercise per week.  · Ensure intake of at least 1200mg of calcium per day.  Eat foods high in calcium like milk and other dairy, green vegetables, fruit, canned fish with soft and edible bones, nuts, calcium-set tofu.  Some foods are calcium-fortified, like bread, cereal, fruit juices and mineral water.  · Help your body make vitamin D by getting 10-15 minutes per day of sunlight.    · Ensure intake of at least 600IU of vitamin D per day.  Eat foods high in vitamin D like oily fish (salmon, sardines, mackerel) and eggs.  Some foods are fortified with vitamin D, like dairy and cereals.  · Avoid high amounts of caffeine and salt, since they can cause the body to loose calcium.  · Limit alcohol intake, since it is associated with weaker bones and is associated with falls and fractures.  · Limit intake of fizzy drinks.  · If you have been prescribed medication, take it regularly and exactly as prescribed.  Let your PCP know if you have any problems or questions about your medication  ----------------------------------------------------------------------------------------------------------------------  Reducing Your Risk of Falls  · Tell your PCP if any of your  medications make you feel tired, dizzy, lightheaded or off-balance.  · Maintain coordination, flexibility and balance by ensuring regular physical activity.  · Limit alcohol intake to 1 drink per day.  Consider avoiding all alcohol intake.  · Ensure good vision.  Visit an ophthalmologist or optometrist regularly for vision screening or to make sure your glasses / contact lens prescription is correct.  If you need glasses or contacts, wear them.  When you get new glasses or contacts, take time to get used to them.  Do not wear sunglasses or tinted lenses when indoors.  · Ensure good hearing.  Have your hearing checked if you are having trouble hearing, or family and friends think you cannot hear them.  If you need a hearing aid, be sure it fits well and wear it.  · Get enough rest.  Ensure about 7-9 hours of sleep every day.  · Get up slowly from your bed or chairs.  Do not start walking until you are sure you feel steady.  · Wear non-skid, rubber-soled, low-heeled shoes.  Do not walk in socks, or in shoes and slippers with smooth soles.  · If your PCP or therapist recommends using a cane or walker, use it regularly.  · Make your home safer.  Increase lighting throughout the house, especially at the top and bottom of stairs.  Ensure lighting is easily turned on when getting up in the middle of the night.  Make sure there are two secure rails on all stairs.  Install grab bars in the bathtub / shower and near the toilet.  Consider using a shower chair and / or a hand-held shower.  · Spread sand or salt on icy surfaces.  Beware of wet surfaces, which can be icy.  · Tell your PCP if you have fallen.

## 2021-09-15 NOTE — PROGRESS NOTES
Subjective     Muriel Franklin is a 71 y.o. female who presents for a subsequent annual wellness visit and also for problem follow up     Pt with hx of follicular lymphoma ; htn ; high chol;  Tia/CVA   Some plaque mild CAD per cath 2014;  IBS - gerd    Has been in [place] on boad and later going to [place]   Chronic constipation    at last ov disc followup with neur regarding dizziness and meningioma      Per Dr Hendrickson Meningioma. Repeat MRI suggested this is stable.     Sees Dr Tran cardio rec starting zetia     She has followup with GI scheduled re IBS GERD constipation     Disc labs - No hx of hypothyroid      t4 low nl 0.9 TSH 6    Lab Results   Component Value Date    WBC 4.5 09/08/2021    HGB 14.7 09/08/2021    HCT 43.4 09/08/2021     09/08/2021    CHOL 158 12/15/2020    TRIG 88 12/15/2020    HDL 59 12/15/2020    ALT 25 09/08/2021    AST 31 09/08/2021     09/08/2021    K 4.2 09/08/2021     09/08/2021    CREATININE 0.75 09/08/2021    BUN 11 09/08/2021    CO2 25 09/08/2021    TSH 6.03 (H) 09/08/2021    LDLCALC 82 12/15/2020        Follicular lymphoma has had onc followup Dr Hendrickson at [place]      Urine odor - has had for a while now    No infection sx   ua 7/621 totally normal     mammo 9/9/21     dexa AP lumbar spine T-score       L1-L4                         -0.5  Corresponding bone mineral density:       1.123 g/cm2.  This value may be falsely elevated secondary to degenerative changes in thelumbar spine.  There is a scoliosis.   AP left forearm T-score, 33% radius:    -1.9  Corresponding bone mineral density:       0.569 g/cm2.   AP hips were not performed secondary to history of surgeries.   IMPRESSION: Low bone density (osteopenia).  Patient Care Team:  Sidra Potter MD as PCP - General (Family Medicine)  Jarad Hendrickson MD as Referring Physician  Gabi Richardson MD as Obstetrician (Obstetrics and Gynecology)    Comprehensive Medical and Social History  Patient Active  Problem List   Diagnosis   • Age-related cataract   • Arthropathy   • Coronary arteriosclerosis in native artery   • Dizziness and giddiness   • Elevated LFTs   • Follicular lymphoma grade II, extranodal and solid organ sites (CMS/HCC)   • Gastro-esophageal reflux disease without esophagitis   • Hypertension   • Primary localized osteoarthritis of pelvic region and thigh   • Acute right-sided low back pain without sciatica   • Meningioma (CMS/HCC)   • Migraine with aura   • Mixed hyperlipidemia   • Non-Hodgkin's lymphoma of multiple sites (CMS/HCC)   • Secondary malignant neoplasm of liver (CMS/HCC)   • Sequelae of cerebral infarction   • Thrombocytopenia (CMS/HCC)   • TIA (transient ischemic attack)   • Shingles (herpes zoster) polyneuropathy   • Medicare annual wellness visit, subsequent   • Osteopenia of multiple sites   • Bilateral shoulder bursitis   • Frequent epistaxis   • Urinary frequency   • Chronic constipation     Past Medical History:   Diagnosis Date   • Hypertension    • Lipid disorder    • Lymphoma, non-Hodgkin's (CMS/HCC)    • 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) 4/3/2019   • 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     Past Surgical History:   Procedure Laterality Date   • EYE SURGERY     • FOOT SURGERY     • HIP SURGERY     • KNEE SURGERY       Allergies   Allergen Reactions   • Isopropyl Alcohol      Other reaction(s): Blisters    • Sulfa (Sulfonamide Antibiotics)      Other reaction(s): thought to cause TIA  Other reaction(s): foot soreness and blistering of left foot after surgery  Other reaction(s): hematoma and blistering after surgery   • Chlorhexidine Rash     Current Outpatient Medications   Medication Sig Dispense Refill   • acyclovir (ZOVIRAX) 400 mg tablet      • aspirin 81 mg chewable tablet Take 81 mg by mouth daily.     • cholecalciferol, vitamin D3, 1,000 unit capsule Take 1,000 Units by mouth as needed.       • collagen, hydrolysate, bovine,  (collagen, hydr, bovine,, bulk,) 100 % powder as needed.       • Lactobac no.41/Bifidobact no.7 (PROBIOTIC-10 ORAL) Take by mouth.     • magnesium chloride 64 mg tablet,delayed release (DR/EC) Take by mouth daily.     • multivitamin tablet Take by mouth daily.     • rosuvastatin (CRESTOR) 20 mg tablet Take 1 tablet (20 mg total) by mouth daily. 90 tablet 0   • topiramate (TOPAMAX) 25 mg tablet TAKE 1 TABLET BY MOUTH ONCE DAILY 90 tablet 0   • tretinoin (RETIN-A) 0.1 % cream APPLY DAILY AT BEDTIME TO FACE     • turmeric root extract 500 mg capsule Take by mouth as needed.       • coenzyme Q10 (COQ10) 100 mg capsule Take 100 mg by mouth 2 (two) times a day.      • ezetimibe (ZETIA) 10 mg tablet        No current facility-administered medications for this visit.     Social History     Tobacco Use   • Smoking status: Never Smoker   • Smokeless tobacco: Never Used   Substance Use Topics   • Alcohol use: Yes     Alcohol/week: 7.0 standard drinks     Types: 7 Glasses of wine per week   • Drug use: No     Family History   Problem Relation Age of Onset   • Arthritis Biological Mother    • Heart attack Biological Father    • Heart disease Biological Brother        Objective   Vitals  Vitals:    09/15/21 1041   BP: 104/70   BP Location: Right upper arm   Patient Position: Sitting   Pulse: 78   Resp: 14   Temp: 36.7 °C (98.1 °F)   TempSrc: Temporal   SpO2: 98%   Weight: 61 kg (134 lb 6.4 oz)     Body mass index is 26.03 kg/m².    Physical Exam  Constitutional:       Appearance: She is well-developed.   HENT:      Head: Normocephalic and atraumatic.      Right Ear: Tympanic membrane, ear canal and external ear normal.      Left Ear: Tympanic membrane, ear canal and external ear normal.      Nose: Nose normal.      Mouth/Throat:      Pharynx: No oropharyngeal exudate.   Eyes:      General: No scleral icterus.        Right eye: No discharge.         Left eye: No discharge.      Conjunctiva/sclera: Conjunctivae normal.      Pupils:  Pupils are equal, round, and reactive to light.   Neck:      Thyroid: No thyromegaly.      Vascular: No carotid bruit.   Cardiovascular:      Rate and Rhythm: Normal rate and regular rhythm.      Heart sounds: Normal heart sounds. No murmur heard.   No friction rub. No gallop.    Pulmonary:      Effort: Pulmonary effort is normal. No respiratory distress.      Breath sounds: Normal breath sounds. No wheezing or rales.   Chest:      Chest wall: No tenderness.   Abdominal:      General: Bowel sounds are normal. There is no distension.      Palpations: Abdomen is soft. There is no mass.      Tenderness: There is no abdominal tenderness. There is no guarding or rebound.   Musculoskeletal:      Cervical back: Normal range of motion and neck supple.   Lymphadenopathy:      Cervical: No cervical adenopathy.   Skin:     General: Skin is warm and dry.      Capillary Refill: Capillary refill takes less than 2 seconds.      Findings: No rash.   Neurological:      Mental Status: She is alert and oriented to person, place, and time.      Cranial Nerves: No cranial nerve deficit.   Psychiatric:         Mood and Affect: Mood normal.         Behavior: Behavior normal.         Thought Content: Thought content normal.         Judgment: Judgment normal.         Advanced Care Plan  Does patient have advance directive?: Yes       Patient has Advance Directive: Advance Directive in physical chart   Does patient have current OOH DNR form?: No           Does patient have current POLST?: No             PHQ  Will the patient answer the depression questions?: Yes   Little interest or pleasure in doing things: Not at all   Feeling down, depressed, or hopeless: Not at all   Depression Risk: 0                                             Mini Cog         Get Up and Go  Result: Pass    STEADI Falls Risk  One or more falls in the last year: No           Has trouble stepping up onto a curb: No   Advised to use a cane or walker to get around safely:  No   Often has to rush to the toilet: No   Feels unsteady when walking: No   Has lost some feeling in feet: No   Often feels sad or depressed: No   Steadies self on furniture while walking at home: No   Takes medication that makes him/her feel lightheaded or more tired than usual: No   Worried about falling: No   Takes medicine to sleep or improve mood: No (unisom)   Needs to push with hands when rising from a chair: No   Falls screen completed: Yes     Hearing and Vision Screening  No exam data present  See HRA for relevant hearing screening response.    Assessment/Plan   Diagnoses and all orders for this visit:    Medicare annual wellness visit, subsequent (Primary)    Follicular lymphoma grade II, extranodal and solid organ sites (CMS/HCC)    Essential hypertension    Chronic constipation    Meningioma (CMS/HCC)    Mixed hyperlipidemia    Non-Hodgkin's lymphoma of multiple sites (CMS/HCC)    Elevated LFTs    Gastro-esophageal reflux disease without esophagitis    disc labs as above   Pt willing to add zetia for chol and crestor     dexa     Has followup with GI up coming     Sees oncologist and cardio       Disc thyroid out of range   Repeat thyroid labs  And chol              See Patient Instructions (the written plan) which was given to the patient for PPPS and health risk factors with interventions.

## 2021-09-27 ENCOUNTER — HOSPITAL ENCOUNTER (OUTPATIENT)
Dept: RADIOLOGY | Age: 71
Discharge: HOME | End: 2021-09-27
Attending: FAMILY MEDICINE
Payer: MEDICARE

## 2021-09-27 ENCOUNTER — HOSPITAL ENCOUNTER (OUTPATIENT)
Dept: RADIOLOGY | Facility: CLINIC | Age: 71
End: 2021-09-27
Attending: FAMILY MEDICINE
Payer: MEDICARE

## 2021-09-27 DIAGNOSIS — C85.98: ICD-10-CM

## 2021-09-27 DIAGNOSIS — E03.8 SUBCLINICAL HYPOTHYROIDISM: ICD-10-CM

## 2021-09-27 DIAGNOSIS — M85.89 OSTEOPENIA OF MULTIPLE SITES: ICD-10-CM

## 2021-09-27 PROCEDURE — 77080 DXA BONE DENSITY AXIAL: CPT

## 2021-10-19 LAB
T3 SERPL-MCNC: 111 NG/DL (ref 76–181)
T4 FREE SERPL-MCNC: 0.8 NG/DL (ref 0.8–1.8)
TSH SERPL-ACNC: 6.05 MIU/L (ref 0.4–4.5)

## 2021-12-07 ENCOUNTER — OFFICE VISIT (OUTPATIENT)
Dept: PRIMARY CARE | Facility: CLINIC | Age: 71
End: 2021-12-07
Payer: MEDICARE

## 2021-12-07 VITALS
BODY MASS INDEX: 23.74 KG/M2 | DIASTOLIC BLOOD PRESSURE: 80 MMHG | OXYGEN SATURATION: 97 % | HEIGHT: 63 IN | WEIGHT: 134 LBS | RESPIRATION RATE: 16 BRPM | HEART RATE: 78 BPM | SYSTOLIC BLOOD PRESSURE: 110 MMHG

## 2021-12-07 DIAGNOSIS — I10 PRIMARY HYPERTENSION: ICD-10-CM

## 2021-12-07 DIAGNOSIS — D32.9 MENINGIOMA (CMS/HCC): ICD-10-CM

## 2021-12-07 DIAGNOSIS — C85.98: ICD-10-CM

## 2021-12-07 DIAGNOSIS — C78.7 SECONDARY MALIGNANT NEOPLASM OF LIVER (CMS/HCC): ICD-10-CM

## 2021-12-07 DIAGNOSIS — E03.8 SUBCLINICAL HYPOTHYROIDISM: ICD-10-CM

## 2021-12-07 DIAGNOSIS — E03.9 ACQUIRED HYPOTHYROIDISM: Primary | ICD-10-CM

## 2021-12-07 PROCEDURE — 99214 OFFICE O/P EST MOD 30 MIN: CPT | Performed by: FAMILY MEDICINE

## 2021-12-07 PROCEDURE — G8752 SYS BP LESS 140: HCPCS | Performed by: FAMILY MEDICINE

## 2021-12-07 PROCEDURE — G8754 DIAS BP LESS 90: HCPCS | Performed by: FAMILY MEDICINE

## 2021-12-07 RX ORDER — LEVOTHYROXINE SODIUM 25 UG/1
25 TABLET ORAL
Qty: 90 TABLET | Refills: 1 | Status: SHIPPED | OUTPATIENT
Start: 2021-12-07 | End: 2022-02-14 | Stop reason: SDUPTHER

## 2021-12-07 NOTE — PATIENT INSTRUCTIONS
Start levothyroxine 25 mcg in am fasting without any other meds or food   Wait 30 min before eating     Take supplements later in the day     Recheck labs in 8 wks on med

## 2021-12-07 NOTE — PROGRESS NOTES
Subjective      Patient ID: Muriel Franklin is a 71 y.o. female.      HPI    The following have been reviewed and updated as appropriate in this visit:   Allergies  Meds  Problems         Pt with hx of follicular lymphoma ; htn ; high chol;  TIA Some plaque mild CAD per cath 2014;  IBS-c  - gerd      Per Dr Hendrickson Meningioma. Repeat MRI suggested this is stable.      Sees Dr Tran cardio rec starting zetia      She has followup with GI scheduled re IBS GERD constipation      feels tired energy level low    Wt Readings from Last 3 Encounters:   12/07/21 60.8 kg (134 lb)   09/15/21 61 kg (134 lb 6.4 oz)   07/06/21 60.2 kg (132 lb 12.8 oz)     New :  TSH mildly elevated       9/2021  6.03  10/201 6.05  dexa osteopenia     Lab Results   Component Value Date    WBC 4.5 09/08/2021    HGB 14.7 09/08/2021    HCT 43.4 09/08/2021     09/08/2021    CHOL 158 12/15/2020    TRIG 88 12/15/2020    HDL 59 12/15/2020    ALT 25 09/08/2021    AST 31 09/08/2021     09/08/2021    K 4.2 09/08/2021     09/08/2021    CREATININE 0.75 09/08/2021    BUN 11 09/08/2021    CO2 25 09/08/2021    TSH 6.05 (H) 10/18/2021    LDLCALC 82 12/15/2020       Will be leaving   Jan 12 [place]  And then later to  [facility]  For few mos on boat  She is not looking forward to it   ? We disc ? Depressed     Review of Systems      Current Outpatient Medications:   •  acyclovir (ZOVIRAX) 400 mg tablet, , Disp: , Rfl:   •  aspirin 81 mg chewable tablet, Take 81 mg by mouth daily., Disp: , Rfl:   •  cholecalciferol, vitamin D3, 1,000 unit capsule, Take 1,000 Units by mouth as needed.  , Disp: , Rfl:   •  ezetimibe (ZETIA) 10 mg tablet, Take 1 tablet (10 mg total) by mouth nightly., Disp: 90 tablet, Rfl: 0  •  Lactobac no.41/Bifidobact no.7 (PROBIOTIC-10 ORAL), Take by mouth., Disp: , Rfl:   •  magnesium chloride 64 mg tablet,delayed release (DR/EC), Take by mouth daily., Disp: , Rfl:   •  multivitamin tablet, Take by mouth daily., Disp: , Rfl:  "  •  rosuvastatin (CRESTOR) 20 mg tablet, Take 1 tablet (20 mg total) by mouth daily., Disp: 90 tablet, Rfl: 0  •  topiramate (TOPAMAX) 25 mg tablet, TAKE 1 TABLET BY MOUTH ONCE DAILY, Disp: 90 tablet, Rfl: 0  •  tretinoin (RETIN-A) 0.1 % cream, APPLY DAILY AT BEDTIME TO FACE, Disp: , Rfl:   •  turmeric root extract 500 mg capsule, Take by mouth as needed.  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    Physical Exam  Constitutional:       Appearance: She is well-developed.   HENT:      Head: Normocephalic and atraumatic.   Cardiovascular:      Rate and Rhythm: Normal rate and regular rhythm.      Heart sounds: Normal heart sounds. No murmur heard.    No friction rub. No gallop.   Pulmonary:      Effort: Pulmonary effort is normal. No respiratory distress.      Breath sounds: Normal breath sounds. No wheezing or rales.   Musculoskeletal:      Cervical back: Normal range of motion and neck supple. No rigidity.   Neurological:      Mental Status: She is alert and oriented to person, place, and time.   Psychiatric:         Behavior: Behavior normal.         Thought Content: Thought content normal.         Judgment: Judgment normal.         Assessment/Plan   Problem List Items Addressed This Visit        Nervous    Meningioma " (CMS/HCC)       Circulatory    Hypertension       Digestive    Secondary malignant neoplasm of liver (CMS/HCC)       Hematologic    Non-Hodgkin's lymphoma of multiple sites (CMS/HCC)      Other Visit Diagnoses     Acquired hypothyroidism    -  Primary    Relevant Medications    levothyroxine 25 mcg tablet    Subclinical hypothyroidism        Relevant Medications    levothyroxine 25 mcg tablet    Other Relevant Orders    T4, free    T3    TSH      disc fatigue low mood may be related to hypothyroidism   Start 25 mcg levothyroxine and recheck labs in 8 weeks ( in [place] call me to let me know when/where done to make sure I get results )      If sx fatigue depression do not improve will need to call       Patient Instructions   Start levothyroxine 25 mcg in am fasting without any other meds or food   Wait 30 min before eating     Take supplements later in the day     Recheck labs in 8 wks on med       Sidra Potter MD

## 2022-01-11 RX ORDER — TOPIRAMATE 25 MG/1
TABLET ORAL
Qty: 90 TABLET | Refills: 0 | Status: SHIPPED | OUTPATIENT
Start: 2022-01-11 | End: 2022-04-04

## 2022-01-24 ENCOUNTER — TELEPHONE (OUTPATIENT)
Dept: PRIMARY CARE | Facility: CLINIC | Age: 72
End: 2022-01-24
Payer: MEDICARE

## 2022-01-24 DIAGNOSIS — E03.9 ACQUIRED HYPOTHYROIDISM: Primary | ICD-10-CM

## 2022-01-24 NOTE — TELEPHONE ENCOUNTER
Please look in emr are there any new labs ( I do not see any) ?  Call pt and find out where / when labs were done and get results

## 2022-01-24 NOTE — TELEPHONE ENCOUNTER
I spoke to Muriel she had her labs done in [place]. I got the fax number to the [facility] she went to. Just waiting for them to fax over labs.

## 2022-01-25 ENCOUNTER — TELEPHONE (OUTPATIENT)
Dept: PRIMARY CARE | Facility: CLINIC | Age: 72
End: 2022-01-25
Payer: MEDICARE

## 2022-01-25 DIAGNOSIS — E03.9 ACQUIRED HYPOTHYROIDISM: Primary | ICD-10-CM

## 2022-01-25 NOTE — TELEPHONE ENCOUNTER
Thyroid low         See lab results notes   and also ov  12/7    We discussed this at her ov in 12/7    We started levothroxine 25 mcg   And I had rec rechecking labs in 8 wks on med

## 2022-01-25 NOTE — TELEPHONE ENCOUNTER
Thank you for getting labs see fax     TSH 5.77  Still above normal which means that thyroid is a bit lost still     And T4 is also a little low     I rec increasing synthroid to 50 mcg  ( she can take 2 of the 25 )  Or should I send rx for 50?     Where send rx to ?  Is she in [place] ? For how long?     She has apt with me in April 4/27     Have thyroid checked again on 50 mcg in 10 weeks

## 2022-01-25 NOTE — TELEPHONE ENCOUNTER
Talked to patient, she started the medication and had repeat labs 1/22/22 in [place]. Called [facility] and will fax report over. Awaiting results

## 2022-01-25 NOTE — TELEPHONE ENCOUNTER
Muriel called to follow up on lab results. She called yesterday and has been in communication with the office about her labs and waiting for results. She had labs done at [facility] in [place], [place] Phone [phone]. Please call patient to reivew once labs are received.

## 2022-01-26 NOTE — TELEPHONE ENCOUNTER
Spoke to Muriel she is fine with taking the two 25 mcg tablets of synthroid. There is no pharmacy to put her chart yet because she's moving around so much in [place]. She will find one and let me know the address to put into the system just in case she starts getting low on medication.

## 2022-02-14 RX ORDER — LEVOTHYROXINE SODIUM 50 UG/1
50 TABLET ORAL
Qty: 90 TABLET | Refills: 0 | Status: SHIPPED | OUTPATIENT
Start: 2022-02-14 | End: 2022-05-10 | Stop reason: SDUPTHER

## 2022-02-14 RX ORDER — LEVOTHYROXINE SODIUM 25 UG/1
25 TABLET ORAL
Qty: 90 TABLET | Refills: 1 | Status: SHIPPED | OUTPATIENT
Start: 2022-02-14 | End: 2022-02-14 | Stop reason: SDUPTHER

## 2022-02-14 NOTE — TELEPHONE ENCOUNTER
Do you have enough medication for the next 5 days?: no     Did you request this medication through your pharmacy or our patient portal in the last day or two? No     Name of medication requested: levothyroxine   Medication Strength: 50 MCG   Mediation Directions: take one tablet per day   Quantity Requested (example 30/90): 90  Number of refills requested: 3           Is this pharmacy a mail order pharmacy?: Retail   Pharmacy Name: [facility]   Pharmacy City: [facility]   Pharmacy Telephone:  [phone] [address] [address]     Additional Comments:    Next Encounter with this provider: 4/27/2022

## 2022-02-14 NOTE — TELEPHONE ENCOUNTER
Medication increased to 50mcg per telephone call on 1/25/22. Patient rather take 1 tablet of 50mcg than 2 25mcg. Resent to pharmacy for 50mcg tablets. Patient due to get labs completed prior to April appointment.

## 2022-04-04 RX ORDER — TOPIRAMATE 25 MG/1
TABLET ORAL
Qty: 90 TABLET | Refills: 0 | Status: SHIPPED | OUTPATIENT
Start: 2022-04-04 | End: 2022-04-28

## 2022-04-14 ENCOUNTER — TELEPHONE (OUTPATIENT)
Dept: PRIMARY CARE | Facility: CLINIC | Age: 72
End: 2022-04-14

## 2022-04-14 NOTE — TELEPHONE ENCOUNTER
I have spoken to Muriel and informed her that she still has labs that need to be completed back from February. She has an appointment with [facility] next Saturday for blood work.

## 2022-04-14 NOTE — TELEPHONE ENCOUNTER
This patient has an upcoming appointment with your office and would like to have their lab work completed prior to the visit.         Patient Preferred Laboratory:[facility]     Patient Primary Insurance in Epic:  [employer]         or [place] Mail?: Electronic

## 2022-04-26 LAB
T3 SERPL-MCNC: 97 NG/DL (ref 76–181)
T4 FREE SERPL-MCNC: 1.3 NG/DL (ref 0.8–1.8)
TSH SERPL-ACNC: 1.83 MIU/L (ref 0.4–4.5)

## 2022-04-27 ENCOUNTER — OFFICE VISIT (OUTPATIENT)
Dept: PRIMARY CARE | Facility: CLINIC | Age: 72
End: 2022-04-27
Payer: MEDICARE

## 2022-04-27 VITALS
HEIGHT: 63 IN | OXYGEN SATURATION: 97 % | HEART RATE: 94 BPM | RESPIRATION RATE: 15 BRPM | WEIGHT: 133.6 LBS | BODY MASS INDEX: 23.67 KG/M2 | DIASTOLIC BLOOD PRESSURE: 70 MMHG | SYSTOLIC BLOOD PRESSURE: 90 MMHG

## 2022-04-27 DIAGNOSIS — I10 PRIMARY HYPERTENSION: Primary | ICD-10-CM

## 2022-04-27 DIAGNOSIS — C82.19 FOLLICULAR LYMPHOMA GRADE II, EXTRANODAL AND SOLID ORGAN SITES: ICD-10-CM

## 2022-04-27 DIAGNOSIS — C85.98: ICD-10-CM

## 2022-04-27 DIAGNOSIS — K59.09 CHRONIC CONSTIPATION: ICD-10-CM

## 2022-04-27 DIAGNOSIS — R79.89 ELEVATED LFTS: ICD-10-CM

## 2022-04-27 DIAGNOSIS — E03.8 SUBCLINICAL HYPOTHYROIDISM: ICD-10-CM

## 2022-04-27 DIAGNOSIS — K21.9 GASTRO-ESOPHAGEAL REFLUX DISEASE WITHOUT ESOPHAGITIS: ICD-10-CM

## 2022-04-27 DIAGNOSIS — D32.9 MENINGIOMA (CMS/HCC): ICD-10-CM

## 2022-04-27 PROBLEM — M12.9 ARTHROPATHY: Status: RESOLVED | Noted: 2019-04-03 | Resolved: 2022-04-27

## 2022-04-27 PROBLEM — M75.51 BILATERAL SHOULDER BURSITIS: Status: RESOLVED | Noted: 2021-02-01 | Resolved: 2022-04-27

## 2022-04-27 PROBLEM — R82.90 ABNORMAL URINE ODOR: Status: RESOLVED | Noted: 2021-09-15 | Resolved: 2022-04-27

## 2022-04-27 PROBLEM — R10.32 LEFT LOWER QUADRANT PAIN: Status: RESOLVED | Noted: 2022-04-27 | Resolved: 2022-04-27

## 2022-04-27 PROBLEM — R42 DIZZINESS AND GIDDINESS: Status: RESOLVED | Noted: 2019-04-03 | Resolved: 2022-04-27

## 2022-04-27 PROBLEM — M75.52 BILATERAL SHOULDER BURSITIS: Status: RESOLVED | Noted: 2021-02-01 | Resolved: 2022-04-27

## 2022-04-27 PROBLEM — R35.0 URINARY FREQUENCY: Status: RESOLVED | Noted: 2021-07-06 | Resolved: 2022-04-27

## 2022-04-27 PROBLEM — I69.30 SEQUELAE OF CEREBRAL INFARCTION: Status: RESOLVED | Noted: 2019-04-03 | Resolved: 2022-04-27

## 2022-04-27 PROBLEM — H25.9 AGE-RELATED CATARACT: Status: RESOLVED | Noted: 2019-04-03 | Resolved: 2022-04-27

## 2022-04-27 PROBLEM — K76.9 LESION OF LIVER: Status: RESOLVED | Noted: 2022-04-27 | Resolved: 2022-04-27

## 2022-04-27 PROBLEM — R04.0 FREQUENT EPISTAXIS: Status: RESOLVED | Noted: 2021-02-01 | Resolved: 2022-04-27

## 2022-04-27 PROBLEM — K62.3 RECTAL PROLAPSE: Status: ACTIVE | Noted: 2022-04-27

## 2022-04-27 PROBLEM — K76.9 LESION OF LIVER: Status: ACTIVE | Noted: 2022-04-27

## 2022-04-27 PROBLEM — R19.06 EPIGASTRIC FULLNESS: Status: ACTIVE | Noted: 2022-04-27

## 2022-04-27 PROBLEM — R19.7 DIARRHEA: Status: ACTIVE | Noted: 2022-04-27

## 2022-04-27 PROBLEM — R10.32 LEFT LOWER QUADRANT PAIN: Status: ACTIVE | Noted: 2022-04-27

## 2022-04-27 PROCEDURE — 99213 OFFICE O/P EST LOW 20 MIN: CPT | Mod: 25 | Performed by: FAMILY MEDICINE

## 2022-04-27 PROCEDURE — G8752 SYS BP LESS 140: HCPCS | Performed by: FAMILY MEDICINE

## 2022-04-27 PROCEDURE — G8754 DIAS BP LESS 90: HCPCS | Performed by: FAMILY MEDICINE

## 2022-04-27 PROCEDURE — 91306 SARS-COV-2 VACCINE BOOSTER MODERNA: CPT | Performed by: FAMILY MEDICINE

## 2022-04-27 PROCEDURE — 0064A SARS-COV-2 VACCINE BOOSTER MODERNA: CPT | Performed by: FAMILY MEDICINE

## 2022-04-27 NOTE — PATIENT INSTRUCTIONS
Ok to stop topamax -  but if aura or migraine return let me know     Followup with cardio and oncologist     Nura Hendrickson

## 2022-04-28 PROBLEM — R19.06 EPIGASTRIC FULLNESS: Status: RESOLVED | Noted: 2022-04-27 | Resolved: 2022-04-28

## 2022-04-28 PROBLEM — C78.7 SECONDARY MALIGNANT NEOPLASM OF LIVER (CMS/HCC): Status: RESOLVED | Noted: 2019-02-06 | Resolved: 2022-04-28

## 2022-04-28 PROBLEM — M54.50 ACUTE RIGHT-SIDED LOW BACK PAIN WITHOUT SCIATICA: Status: RESOLVED | Noted: 2019-04-03 | Resolved: 2022-04-28

## 2022-05-23 ENCOUNTER — TRANSCRIBE ORDERS (OUTPATIENT)
Dept: SCHEDULING | Age: 72
End: 2022-05-23

## 2022-05-23 DIAGNOSIS — Q93.88 CHROMOSOME 12Q15-Q21.1 MICRODELETION SYNDROME: ICD-10-CM

## 2022-05-23 DIAGNOSIS — I11.9 HYPERTENSIVE HEART DISEASE WITHOUT HEART FAILURE: ICD-10-CM

## 2022-05-23 DIAGNOSIS — Z82.49 FAMILY HISTORY OF ISCHEMIC HEART DISEASE AND OTHER DISEASES OF THE CIRCULATORY SYSTEM: ICD-10-CM

## 2022-05-23 DIAGNOSIS — Z86.79 PERSONAL HISTORY OF OTHER DISEASES OF THE CIRCULATORY SYSTEM: ICD-10-CM

## 2022-05-23 DIAGNOSIS — E78.2 MIXED HYPERLIPIDEMIA: ICD-10-CM

## 2022-05-23 DIAGNOSIS — I34.0 NONRHEUMATIC MITRAL (VALVE) INSUFFICIENCY: ICD-10-CM

## 2022-05-23 DIAGNOSIS — I25.10 ATHEROSCLEROTIC HEART DISEASE OF NATIVE CORONARY ARTERY WITHOUT ANGINA PECTORIS: Primary | ICD-10-CM

## 2022-07-01 NOTE — PROGRESS NOTES
Subjective      Patient ID: Muriel Franklin is a 72 y.o. female.      HPI    The following have been reviewed and updated as appropriate in this visit:   Tobacco  Allergies  Meds  Problems  Med Hx  Surg Hx  Fam Hx          Pt with hx of follicular lymphoma ; htn ; high chol;  TIA Some plaque mild CAD per cath 2014;  IBS-c  - gerd  hypothyroid new started med 25 mcg in December     Just go home  Was shyla quiroz past 4-5 mos     onc retuxin may need to restart Per Dr Hendrickson Meningioma. Will repeat MRI    Sees Dr Tran cardio rec starting zetia     See him Dr Kirkpatrick  Chronic constipation  Had colonoscopy and upper endo     tsh 6.02 to 1.83 on 25 mcg      Per cardio called this am Lft elevated stopping zetia per cardio - called office and received results     Topamax for migraine aura has nto had in yrs not sure if still needs would like to stop    Taking unisom 1/2 for sleep that helping    Lab Results   Component Value Date    WBC 4.5 09/08/2021    HGB 14.7 09/08/2021    HCT 43.4 09/08/2021     09/08/2021    CHOL 158 12/15/2020    TRIG 88 12/15/2020    HDL 59 12/15/2020    ALT 25 09/08/2021    AST 31 09/08/2021     09/08/2021    K 4.2 09/08/2021     09/08/2021    CREATININE 0.75 09/08/2021    BUN 11 09/08/2021    CO2 25 09/08/2021    TSH 1.83 04/26/2022    LDLCALC 82 12/15/2020     Review of Systems      Current Outpatient Medications:   •  acyclovir (ZOVIRAX) 400 mg tablet, , Disp: , Rfl:   •  aspirin 81 mg chewable tablet, Take 81 mg by mouth daily., Disp: , Rfl:   •  cholecalciferol, vitamin D3, 1,000 unit capsule, Take 1,000 Units by mouth as needed.  , Disp: , Rfl:   •  levothyroxine (SYNTHROID) 50 mcg tablet, Take 1 tablet (50 mcg total) by mouth daily., Disp: 90 tablet, Rfl: 0  •  multivitamin tablet, Take by mouth daily., Disp: , Rfl:   •  rosuvastatin (CRESTOR) 20 mg tablet, Take 1 tablet (20 mg total) by mouth daily., Disp: 90 tablet, Rfl: 0  •  tretinoin  "(RETIN-A) 0.1 % cream, APPLY DAILY AT BEDTIME TO FACE, Disp: , Rfl:   •  Lactobac no.41/Bifidobact no.7 (PROBIOTIC-10 ORAL), Take by mouth., Disp: , Rfl:   •  magnesium chloride 64 mg tablet,delayed release (DR/EC), Take by mouth daily., Disp: , Rfl:   •  turmeric root extract 500 mg capsule, Take by mouth as needed.  , Disp: , Rfl:     Past Medical History:   Diagnosis Date   • Hypertension    • Lipid disorder    • Lymphoma, non-Hodgkin's (CMS/HCC)    • 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) 04/03/2019   • Non-Hodgkin lymphoma (CMS/HCC)    • 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     Past Surgical History:   Procedure Laterality Date   • EYE SURGERY     • FOOT SURGERY     • HIP SURGERY     • KNEE SURGERY       Objective     Vitals:    04/27/22 1036   BP: 90/70   BP Location: Left upper arm   Patient Position: Sitting   Pulse: 94   Resp: 15   SpO2: 97%   Weight: 60.6 kg (133 lb 9.6 oz)   Height: 1.588 m (5' 2.5\")       Body mass index is 24.05 kg/m².    Physical Exam  Constitutional:       Appearance: Normal appearance. She is normal weight.   Cardiovascular:      Rate and Rhythm: Normal rate and regular rhythm.   Musculoskeletal:      Right lower leg: No edema.      Left lower leg: No edema.   Neurological:      General: No focal deficit present.      Mental Status: She is alert.   Psychiatric:         Mood and Affect: Mood normal.         Behavior: Behavior normal.         Thought Content: Thought content normal.         Judgment: Judgment normal.         Assessment/Plan   Problem List Items Addressed This Visit        Nervous    Meningioma (CMS/HCC)       Circulatory    Hypertension - Primary       Digestive    Gastro-esophageal reflux disease without esophagitis    Chronic constipation       Hematologic    Follicular lymphoma grade II, extranodal and solid organ sites (CMS/HCC)    Non-Hodgkin's lymphoma of multiple sites (CMS/HCC)       Other    Elevated LFTs      Other " Applied Visit Diagnoses     Subclinical hypothyroidism        Relevant Orders    TSH    T4, free    T3      disc constipation chronic magnesium helps but too much causes diarrhea disc need to taper dose     Patient Instructions   Ok to stop topamax -  but if aura or migraine return let me know     Followup with cardio and oncologist     Nura Potter MD

## 2022-08-18 ENCOUNTER — TRANSCRIBE ORDERS (OUTPATIENT)
Dept: SCHEDULING | Age: 72
End: 2022-08-18

## 2022-08-18 DIAGNOSIS — Z12.31 ENCOUNTER FOR SCREENING MAMMOGRAM FOR MALIGNANT NEOPLASM OF BREAST: Primary | ICD-10-CM

## 2022-09-13 ENCOUNTER — HOSPITAL ENCOUNTER (OUTPATIENT)
Dept: RADIOLOGY | Age: 72
Discharge: HOME | End: 2022-09-13
Attending: OBSTETRICS & GYNECOLOGY
Payer: MEDICARE

## 2022-09-13 DIAGNOSIS — Z12.31 ENCOUNTER FOR SCREENING MAMMOGRAM FOR MALIGNANT NEOPLASM OF BREAST: ICD-10-CM

## 2022-09-13 PROCEDURE — 77063 BREAST TOMOSYNTHESIS BI: CPT

## 2022-10-18 LAB
T3 SERPL-MCNC: 95 NG/DL (ref 76–181)
T4 FREE SERPL-MCNC: 1.2 NG/DL (ref 0.8–1.8)
TSH SERPL-ACNC: 2.02 MIU/L (ref 0.4–4.5)

## 2022-11-08 RX ORDER — LEVOTHYROXINE SODIUM 50 UG/1
TABLET ORAL
Qty: 90 TABLET | Refills: 1 | Status: SHIPPED | OUTPATIENT
Start: 2022-11-08 | End: 2023-04-12

## 2022-12-07 ENCOUNTER — OFFICE VISIT (OUTPATIENT)
Dept: PRIMARY CARE | Facility: CLINIC | Age: 72
End: 2022-12-07
Payer: MEDICARE

## 2022-12-07 VITALS
OXYGEN SATURATION: 95 % | DIASTOLIC BLOOD PRESSURE: 76 MMHG | RESPIRATION RATE: 16 BRPM | SYSTOLIC BLOOD PRESSURE: 118 MMHG | HEART RATE: 100 BPM | TEMPERATURE: 98 F | HEIGHT: 63 IN | BODY MASS INDEX: 24.1 KG/M2 | WEIGHT: 136 LBS

## 2022-12-07 DIAGNOSIS — K21.9 GASTRO-ESOPHAGEAL REFLUX DISEASE WITHOUT ESOPHAGITIS: ICD-10-CM

## 2022-12-07 DIAGNOSIS — E03.8 SUBCLINICAL HYPOTHYROIDISM: ICD-10-CM

## 2022-12-07 DIAGNOSIS — E03.9 ACQUIRED HYPOTHYROIDISM: ICD-10-CM

## 2022-12-07 DIAGNOSIS — J00 ACUTE NASOPHARYNGITIS: ICD-10-CM

## 2022-12-07 DIAGNOSIS — C82.19 FOLLICULAR LYMPHOMA GRADE II, EXTRANODAL AND SOLID ORGAN SITES: ICD-10-CM

## 2022-12-07 DIAGNOSIS — I10 PRIMARY HYPERTENSION: ICD-10-CM

## 2022-12-07 DIAGNOSIS — R05.9 COUGH, UNSPECIFIED TYPE: ICD-10-CM

## 2022-12-07 DIAGNOSIS — K59.09 CHRONIC CONSTIPATION: ICD-10-CM

## 2022-12-07 DIAGNOSIS — Z00.00 MEDICARE ANNUAL WELLNESS VISIT, SUBSEQUENT: Primary | ICD-10-CM

## 2022-12-07 PROCEDURE — G0439 PPPS, SUBSEQ VISIT: HCPCS | Performed by: FAMILY MEDICINE

## 2022-12-07 PROCEDURE — 87637 SARSCOV2&INF A&B&RSV AMP PRB: CPT | Performed by: FAMILY MEDICINE

## 2022-12-07 ASSESSMENT — MINI COG
COMPLETED: YES
TOTAL SCORE: 5

## 2022-12-07 ASSESSMENT — PATIENT HEALTH QUESTIONNAIRE - PHQ9: SUM OF ALL RESPONSES TO PHQ9 QUESTIONS 1 & 2: 0

## 2022-12-07 NOTE — PROGRESS NOTES
Subjective     Muriel Franklin is a 72 y.o. female who presents for a subsequent annual wellness visit.     Patient Care Team:  Sidra Potter MD as PCP - General (Family Medicine)  Jarad Hendrickson MD as Referring Physician  RichardsonGabi rivas MD as Obstetrician (Obstetrics and Gynecology)    Covid longer course sx started end of august thru October   Sx did resolve - took paxlovid and had rebound   Has a new cough so does her    No fever good energy level feels good   Getting better playing pickel ball  Feeling fine just cough     Lab Results   Component Value Date    WBC 4.5 09/08/2021    HGB 14.7 09/08/2021    HCT 43.4 09/08/2021     09/08/2021    CHOL 158 12/15/2020    TRIG 88 12/15/2020    HDL 59 12/15/2020    ALT 25 09/08/2021    AST 31 09/08/2021     09/08/2021    K 4.2 09/08/2021     09/08/2021    CREATININE 0.75 09/08/2021    BUN 11 09/08/2021    CO2 25 09/08/2021    TSH 2.02 10/18/2022    LDLCALC 82 12/15/2020   LLD per cardio letter  73     Cardio letter reviewed 10/28 2022   CAD cath 2014 no futher peralta needed per cardio at present    Patent foramen ovale per cardio echo bubble study not though enough to warrant anticoagulation   Chol rosuvastatin 40     Oncologist Dr Hendrickson -  [facility]      GI Dr Kaya lara in January       Comprehensive Medical and Social History  Patient Active Problem List   Diagnosis   • Coronary arteriosclerosis in native artery   • Elevated LFTs   • Follicular lymphoma grade II, extranodal and solid organ sites (CMS/HCC)   • Gastro-esophageal reflux disease without esophagitis   • Hypertension   • Primary localized osteoarthritis of pelvic region and thigh   • Meningioma (CMS/HCC)   • Migraine with aura   • Mixed hyperlipidemia   • Non-Hodgkin's lymphoma of multiple sites (CMS/HCC)   • TIA (transient ischemic attack)   • Shingles (herpes zoster) polyneuropathy   • Medicare annual wellness visit, subsequent   • Osteopenia of multiple sites   •  Chronic constipation   • Diarrhea   • Rectal prolapse     Past Medical History:   Diagnosis Date   • COVID 08/31/2022   • Hypertension    • Lipid disorder    • Lymphoma, non-Hodgkin's (CMS/HCC)    • 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) 04/03/2019   • Non-Hodgkin lymphoma (CMS/HCC)    • 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     Past Surgical History:   Procedure Laterality Date   • EYE SURGERY     • FOOT SURGERY     • HIP SURGERY     • KNEE SURGERY       Allergies   Allergen Reactions   • Isopropyl Alcohol      Other reaction(s): Blisters    • Sulfa (Sulfonamide Antibiotics)      Other reaction(s): thought to cause TIA  Other reaction(s): foot soreness and blistering of left foot after surgery  Other reaction(s): hematoma and blistering after surgery   • Chlorhexidine Rash     Current Outpatient Medications   Medication Sig Dispense Refill   • acyclovir (ZOVIRAX) 400 mg tablet      • aspirin 81 mg chewable tablet Take 81 mg by mouth daily.     • cholecalciferol, vitamin D3, 1,000 unit capsule Take 1,000 Units by mouth as needed.       • levothyroxine (SYNTHROID) 50 mcg tablet TAKE 1 TABLET BY MOUTH EVERY DAY 90 tablet 1   • magnesium chloride 64 mg tablet,delayed release (DR/EC) Take by mouth daily.     • multivitamin tablet Take by mouth daily.     • rosuvastatin (CRESTOR) 20 mg tablet Take 1 tablet (20 mg total) by mouth daily. 90 tablet 0   • tretinoin (RETIN-A) 0.1 % cream APPLY DAILY AT BEDTIME TO FACE       No current facility-administered medications for this visit.     Social History     Tobacco Use   • Smoking status: Never   • Smokeless tobacco: Never   Vaping Use   • Vaping Use: Never used   Substance Use Topics   • Alcohol use: Yes     Alcohol/week: 7.0 standard drinks     Types: 7 Glasses of wine per week   • Drug use: No     Family History   Problem Relation Age of Onset   • Arthritis Biological Mother    • Heart attack Biological Father    • Heart disease  "Biological Brother        Objective   Vitals  Vitals:    12/07/22 1559   BP: 118/76   BP Location: Left upper arm   Patient Position: Sitting   Pulse: 100   Resp: 16   Temp: 36.7 °C (98 °F)   TempSrc: Temporal   SpO2: 95%   Weight: 61.7 kg (136 lb)   Height: 1.588 m (5' 2.5\")     Body mass index is 24.48 kg/m².  Physical Exam  Constitutional:       Appearance: She is well-developed, normal weight and well-nourished.   HENT:      Head: Normocephalic and atraumatic.      Right Ear: Tympanic membrane, ear canal and external ear normal.      Left Ear: Tympanic membrane, ear canal and external ear normal.      Nose: Nose normal.      Mouth/Throat:      Mouth: Oropharynx is clear and moist.      Pharynx: No oropharyngeal exudate.   Eyes:      General: No scleral icterus.        Right eye: No discharge.         Left eye: No discharge.      Conjunctiva/sclera: Conjunctivae normal.      Pupils: Pupils are equal, round, and reactive to light.   Neck:      Thyroid: No thyromegaly.   Cardiovascular:      Rate and Rhythm: Normal rate and regular rhythm.      Pulses: Intact distal pulses.      Heart sounds: Normal heart sounds. No murmur heard.    No friction rub. No gallop.   Pulmonary:      Effort: Pulmonary effort is normal. No respiratory distress.      Breath sounds: Normal breath sounds. No wheezing or rales.   Chest:      Chest wall: No tenderness.   Abdominal:      General: Bowel sounds are normal. There is no distension.      Palpations: Abdomen is soft. There is no mass.      Tenderness: There is no abdominal tenderness. There is no guarding or rebound.   Musculoskeletal:         General: No edema.      Cervical back: Normal range of motion and neck supple.      Right lower leg: No edema.      Left lower leg: No edema.   Lymphadenopathy:      Cervical: No cervical adenopathy.   Skin:     General: Skin is warm and dry.      Capillary Refill: Capillary refill takes less than 2 seconds.      Findings: No rash. "   Neurological:      Mental Status: She is alert and oriented to person, place, and time.      Cranial Nerves: No cranial nerve deficit.   Psychiatric:         Mood and Affect: Mood and affect and mood normal.         Behavior: Behavior normal.         Thought Content: Thought content normal.         Judgment: Judgment normal.       Advanced Care Plan                                        PHQ  Will the patient answer the depression questions?: Yes   Little interest or pleasure in doing things: Not at all   Feeling down, depressed, or hopeless: Not at all   Depression Risk: 0                                             Mini Cog  Completed: Yes  Score: 5  Result: Negative      Get Up and Go    normal     STEADI Falls Risk  One or more falls in the last year: No           Has trouble stepping up onto a curb: No   Advised to use a cane or walker to get around safely: No   Often has to rush to the toilet: No   Feels unsteady when walking: No   Has lost some feeling in feet: No   Often feels sad or depressed: No   Steadies self on furniture while walking at home: No   Takes medication that makes him/her feel lightheaded or more tired than usual: No   Worried about falling: No   Takes medicine to sleep or improve mood: No   Needs to push with hands when rising from a chair: No   Falls screen completed: Yes     Hearing and Vision Screening  No results found.  See HRA for relevant hearing screening response.    Assessment/Plan   Diagnoses and all orders for this visit:    Medicare annual wellness visit, subsequent (Primary)    Acute nasopharyngitis    Primary hypertension    Acquired hypothyroidism    Follicular lymphoma grade II, extranodal and solid organ sites (CMS/HCC)    Chronic constipation    Gastro-esophageal reflux disease without esophagitis    Subclinical hypothyroidism    Cough, unspecified type  -     COVID- 19 PCR Symptomatic (includes FLU A/B & RSV) - [facility]; Future      Has followup with oncologist cardio  GI     Thyroid in range      ldl at goal     Disc uri sx if cough persists -  Has been resolving - let me know    See Patient Instructions (the written plan) which was given to the patient for PPPS and health risk factors with interventions.

## 2022-12-07 NOTE — LETTER
December 7, 2022     Patient: Muriel Franklin  YOB: 1950  Date of Visit: 12/7/2022    To Whom it May Concern:    Muriel Franklin was seen in my clinic on 12/7/2022 at 4:00 pm. Please excuse Muriel for her absence from work on this day to make the appointment.    If you have any questions or concerns, please don't hesitate to call.         Sincerely,         Sidra Potter MD        CC: No Recipients

## 2022-12-08 LAB
FLUAV RNA SPEC QL NAA+PROBE: NEGATIVE
FLUBV RNA SPEC QL NAA+PROBE: NEGATIVE
RSV RNA SPEC QL NAA+PROBE: NEGATIVE
SARS-COV-2 RNA RESP QL NAA+PROBE: NEGATIVE

## 2022-12-27 ENCOUNTER — TELEPHONE (OUTPATIENT)
Dept: PRIMARY CARE | Facility: CLINIC | Age: 72
End: 2022-12-27
Payer: MEDICARE

## 2022-12-27 NOTE — TELEPHONE ENCOUNTER
Patient has been having episodes of Vertigo. Symptoms include dizziness, lightheaded, and having to be in bed for almost the entire day. This is occurring  on and off Since last Sunday 12/18/2022.    She has an appointment with ENT on 01/13/2023 and wants to know if there is something she can take in the meantime until her appointment.    Please advise?

## 2022-12-27 NOTE — TELEPHONE ENCOUNTER
Sw pt, pt interested in getting a new script for meclizine; states that she' been shown th exercise that helps with the vertigo; pharmacy is the current pharmacy

## 2022-12-27 NOTE — TELEPHONE ENCOUNTER
Sw pt, pt states  that this isn't first time, but this was the first time it's lasted this long; states it's been going on for at least week; reports she had a cold and not sure if that triggered it or not. Pt states that she has been taken meclizine and it hasn't been helping with the sx

## 2022-12-28 RX ORDER — MECLIZINE HCL 25MG 25 MG/1
25 TABLET, CHEWABLE ORAL 3 TIMES DAILY PRN
Qty: 30 TABLET | Refills: 1 | Status: SHIPPED | OUTPATIENT
Start: 2022-12-28 | End: 2023-05-15 | Stop reason: ENTERED-IN-ERROR

## 2022-12-29 ENCOUNTER — TELEPHONE (OUTPATIENT)
Dept: PRIMARY CARE | Facility: CLINIC | Age: 72
End: 2022-12-29
Payer: MEDICARE

## 2022-12-29 RX ORDER — AMOXICILLIN AND CLAVULANATE POTASSIUM 875; 125 MG/1; MG/1
1 TABLET, FILM COATED ORAL 2 TIMES DAILY
Qty: 20 TABLET | Refills: 0 | Status: SHIPPED | OUTPATIENT
Start: 2022-12-29 | End: 2023-01-08

## 2022-12-29 NOTE — TELEPHONE ENCOUNTER
Patient called in stating that she has had COVID for the last 6 weeks. She is experiencing a cough with mucus and requesting a script to be sent to the pharmacy for the cough. Please advise. Thank you.

## 2022-12-29 NOTE — TELEPHONE ENCOUNTER
Sw pt, pt states that her sx have gotten worse since her 12/07 visit; reports cough, sneezing and runny nose--per swab result note, pt to call back in to office to update on sx

## 2022-12-29 NOTE — TELEPHONE ENCOUNTER
Please let her know I am sending rx to pharmacy for antibitoic     If that does not help and sx persist call

## 2023-01-23 ENCOUNTER — HOSPITAL ENCOUNTER (OUTPATIENT)
Dept: RADIOLOGY | Facility: CLINIC | Age: 73
Discharge: HOME | End: 2023-01-23
Attending: NURSE PRACTITIONER
Payer: MEDICARE

## 2023-01-23 ENCOUNTER — ANCILLARY ORDERS (OUTPATIENT)
Dept: PRIMARY CARE | Facility: CLINIC | Age: 73
End: 2023-01-23

## 2023-01-23 ENCOUNTER — OFFICE VISIT (OUTPATIENT)
Dept: PRIMARY CARE | Facility: CLINIC | Age: 73
End: 2023-01-23
Payer: MEDICARE

## 2023-01-23 VITALS
BODY MASS INDEX: 26.7 KG/M2 | RESPIRATION RATE: 16 BRPM | HEIGHT: 60 IN | OXYGEN SATURATION: 96 % | WEIGHT: 136 LBS | DIASTOLIC BLOOD PRESSURE: 74 MMHG | HEART RATE: 80 BPM | TEMPERATURE: 98 F | SYSTOLIC BLOOD PRESSURE: 120 MMHG

## 2023-01-23 DIAGNOSIS — E78.2 MIXED HYPERLIPIDEMIA: ICD-10-CM

## 2023-01-23 DIAGNOSIS — I10 PRIMARY HYPERTENSION: ICD-10-CM

## 2023-01-23 DIAGNOSIS — M54.6 ACUTE LEFT-SIDED THORACIC BACK PAIN: Primary | ICD-10-CM

## 2023-01-23 DIAGNOSIS — M54.6 ACUTE LEFT-SIDED THORACIC BACK PAIN: ICD-10-CM

## 2023-01-23 DIAGNOSIS — M54.9 BACK PAIN, UNSPECIFIED BACK LOCATION, UNSPECIFIED BACK PAIN LATERALITY, UNSPECIFIED CHRONICITY: ICD-10-CM

## 2023-01-23 LAB
BACTERIA, POC: 0
BILIRUBIN, POC: NEGATIVE
BLOOD URINE, POC: NEGATIVE
CLARITY, POC: CLEAR
COLOR, POC: YELLOW
EXPIRATION DATE: NORMAL
GLUCOSE URINE, POC: NEGATIVE
KETONES, POC: NEGATIVE
LEUKOCYTE EST, POC: NEGATIVE
Lab: NORMAL
NITRITE, POC: NEGATIVE
PH, POC: 5
POCT MANUFACTURER: NORMAL
PROTEIN, POC: NORMAL
SPECIFIC GRAVITY, POC: 1.02
UROBILINOGEN, POC: 0.2

## 2023-01-23 PROCEDURE — 99213 OFFICE O/P EST LOW 20 MIN: CPT | Performed by: NURSE PRACTITIONER

## 2023-01-23 PROCEDURE — G8754 DIAS BP LESS 90: HCPCS | Performed by: NURSE PRACTITIONER

## 2023-01-23 PROCEDURE — G8752 SYS BP LESS 140: HCPCS | Performed by: NURSE PRACTITIONER

## 2023-01-23 PROCEDURE — 72070 X-RAY EXAM THORAC SPINE 2VWS: CPT

## 2023-01-23 PROCEDURE — 81002 URINALYSIS NONAUTO W/O SCOPE: CPT | Performed by: NURSE PRACTITIONER

## 2023-01-23 RX ORDER — RITUXIMAB 10 MG/ML
INJECTION, SOLUTION INTRAVENOUS
COMMUNITY
End: 2023-05-15 | Stop reason: ENTERED-IN-ERROR

## 2023-01-23 RX ORDER — ASPIRIN 81 MG/1
TABLET ORAL
COMMUNITY
End: 2023-05-15 | Stop reason: ENTERED-IN-ERROR

## 2023-01-23 RX ORDER — LOSARTAN POTASSIUM 100 %
POWDER (GRAM) MISCELLANEOUS
COMMUNITY
End: 2023-05-15 | Stop reason: ENTERED-IN-ERROR

## 2023-01-23 RX ORDER — ROSUVASTATIN CALCIUM 40 MG/1
40 TABLET, COATED ORAL NIGHTLY
COMMUNITY

## 2023-01-23 RX ORDER — TOPIRAMATE 100 MG/1
TABLET, FILM COATED ORAL
COMMUNITY
End: 2023-05-15 | Stop reason: ENTERED-IN-ERROR

## 2023-01-23 RX ORDER — ACYCLOVIR 200 MG/5ML
SUSPENSION ORAL
COMMUNITY
End: 2023-05-15 | Stop reason: ENTERED-IN-ERROR

## 2023-01-23 RX ORDER — NAPROXEN 500 MG/1
500 TABLET ORAL 2 TIMES DAILY PRN
Qty: 30 TABLET | Refills: 0 | Status: SHIPPED | OUTPATIENT
Start: 2023-01-23 | End: 2023-05-15 | Stop reason: ENTERED-IN-ERROR

## 2023-01-23 ASSESSMENT — ENCOUNTER SYMPTOMS
NECK PAIN: 0
HEMATURIA: 0
DIZZINESS: 0
NUMBNESS: 0
CHILLS: 0
SHORTNESS OF BREATH: 0
BACK PAIN: 1
FREQUENCY: 0
DYSURIA: 0
HEADACHES: 0
WEAKNESS: 0
FEVER: 0

## 2023-01-23 NOTE — PROGRESS NOTES
[address], [address]  [place] [address]  Phone [phone] Fax [phone]     Reason for visit:   Chief Complaint   Patient presents with   • Back Pain     Started 2 weeks ago  Sx: back pain   Used Rx: heating pads, aleve, tylenol       HPI   Muriel Franklin is a 72 y.o. female who presents with back pain x 2 weeks.  Left mid back pain.   There were no known injuries, she has been going to the gym more often.   Pain worse when sitting for long periods of time.   Improves with exercise, yoga/stretching.   Using heating pad- this has helped, aleve or tylenol as needed- has helped.  There is no pain radiation, tingling, or numbness.   She reports hx of low back pain off and on over the years.     Also would like to see a nutritionist, to discuss general nutrition.  Follows mostly mediterranean diet.  Hx of covid in the fall- had decreased appetite after this. Supplements with boost drinks.  Hx HL, HTN.          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       Surgical History:  Past Surgical History:   Procedure Laterality Date   • EYE SURGERY     • FOOT SURGERY     • HIP SURGERY     • KNEE SURGERY         Social History:  Social History     Social History Narrative   • Not on file       Family History:  Family History   Problem Relation Age of Onset   • Arthritis Biological Mother    • Heart attack Biological Father    • Heart disease Biological Brother        Allergies:  Isopropyl alcohol, Sulfa (sulfonamide antibiotics), and Chlorhexidine    Current Medications:  Current Outpatient Medications   Medication Sig Dispense Refill   • acyclovir (ZOVIRAX) 200 mg/5 mL (5 mL) suspension oral  suspension      • aspirin 81 mg enteric coated tablet      • cholecalciferol, vitamin D3, 1,000 unit capsule Take 1,000 Units by mouth as needed.       • levothyroxine (SYNTHROID) 50 mcg tablet TAKE 1 TABLET BY MOUTH EVERY DAY 90 tablet 1   • magnesium chloride 64 mg tablet,delayed release (DR/EC) Take by mouth daily.     • multivitamin tablet Take by mouth daily.     • riTUXimab (RITUXAN) 10 mg/mL injection      • rosuvastatin (CRESTOR) 10 mg tablet      • tretinoin (RETIN-A) 0.1 % cream APPLY DAILY AT BEDTIME TO FACE     • acyclovir (ZOVIRAX) 400 mg tablet      • aspirin 81 mg chewable tablet Take 81 mg by mouth daily.     • losartan potassium, bulk, 100 % powder      • meclizine (ANTIVERT) 25 mg tablet Take 1 tablet (25 mg total) by mouth 3 (three) times a day as needed for dizziness for up to 10 days. 30 tablet 1   • rosuvastatin (CRESTOR) 20 mg tablet Take 1 tablet (20 mg total) by mouth daily. 90 tablet 0   • topiramate (TOPAMAX) 100 mg tablet        No current facility-administered medications for this visit.       Review of Systems:  Review of Systems   Constitutional: Negative for chills and fever.   Respiratory: Negative for shortness of breath.    Cardiovascular: Negative for chest pain.   Genitourinary: Negative for dysuria, frequency and hematuria.   Musculoskeletal: Positive for back pain. Negative for gait problem and neck pain.   Neurological: Negative for dizziness, weakness, numbness and headaches.       Objective     Vital Signs:  Vitals:    01/23/23 0900   BP: 120/74   Pulse: 80   Resp: 16   Temp: 36.7 °C (98 °F)   SpO2: 96%       BMI:  Body mass index is 26.56 kg/m².     Physical Exam  Vitals reviewed.   Constitutional:       Appearance: Normal appearance.   Cardiovascular:      Rate and Rhythm: Normal rate.      Heart sounds: Normal heart sounds.   Pulmonary:      Effort: Pulmonary effort is normal.   Musculoskeletal:         General: Tenderness (left thoracic paraspinals. Full ROM.) present.  Normal range of motion.   Neurological:      Mental Status: She is alert and oriented to person, place, and time.         Recent labs before today:     Lab Results   Component Value Date    WBC 4.5 09/08/2021    HGB 14.7 09/08/2021    HCT 43.4 09/08/2021     09/08/2021    CHOL 158 12/15/2020    TRIG 88 12/15/2020    HDL 59 12/15/2020    ALT 25 09/08/2021    AST 31 09/08/2021     09/08/2021    K 4.2 09/08/2021     09/08/2021    CREATININE 0.75 09/08/2021    BUN 11 09/08/2021    CO2 25 09/08/2021    TSH 2.02 10/18/2022        Procedures       Problem List Items Addressed This Visit        Circulatory    Hypertension    Relevant Orders    Ambulatory referral to [facility]       Other    Mixed hyperlipidemia     Referral to nutritionist.         Relevant Medications    rosuvastatin (CRESTOR) 10 mg tablet    Other Relevant Orders    Ambulatory referral to [facility]    Acute left-sided thoracic back pain - Primary     Obtain x-ray.  Continue heat application, home stretching exercises, NSAID as needed.          Relevant Orders    X-RAY THORACIC SPINE 4+ VIEWS   Other Visit Diagnoses     Back pain, unspecified back location, unspecified back pain laterality, unspecified chronicity        Relevant Orders    Urine culture (clean catch)    POCT urinalysis dipstick                  MONET Bauman  1/23/2023

## 2023-02-27 ENCOUNTER — TRANSCRIBE ORDERS (OUTPATIENT)
Dept: SCHEDULING | Facility: REHABILITATION | Age: 73
End: 2023-02-27

## 2023-02-27 DIAGNOSIS — H83.2X1 LABYRINTHINE DYSFUNCTION, RIGHT EAR: Primary | ICD-10-CM

## 2023-04-12 RX ORDER — LEVOTHYROXINE SODIUM 50 UG/1
TABLET ORAL
Qty: 90 TABLET | Refills: 1 | Status: SHIPPED | OUTPATIENT
Start: 2023-04-12 | End: 2023-10-30

## 2023-05-02 NOTE — H&P (VIEW-ONLY)
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    TREATMENT SUMMARY:  1. CT-guided core needle/FNA of right inguinal lymph node 2/10/16.  Pathology revealed B-cell lymphoma consisting of small lymphoid cells.  Flow cytometry revealed clonal CD20+, CD5-, CD23-, kappa light chain expressing B-cells. Definitive classification not possible given limited sample size however morphologically appears most consistent with low-grade follicular lymphoma or marginal zone lymphoma.  2. Peripheral blood flow cytometry 2/18/16 revealed an expansion of surface kappa restricted CD5- CD10(subset)+ CD19+ CD20+ CD23- CD38(subset)+ B cells [64% of total events] diagnostic of a mature B cell neoplasm.  Cytogenetics revealed +12 within normal limits 2/200 cells.  del(13),-13,DEVONTE,Tp53, t(11;14) all negative 0/200 cells.  3. Right inguinal lymph node excisional biopsy 3/14/16 revealed follicular lymphoma, grade 1-2 of 3, in a follicular pattern.  4. U/S-guided biopsy left hepatic lesion 2/12/19.  Pathology revealed follicular lymphoma, WHO grade 1-2 of 3, predominantly (>75%) follicular.  The apparently distinct kappa and lambda expressing subsets of CD10+ B cells detected by flow cytometry (suggesting a biclonal process) cannot be distinguished histologically.   5. Rituximab weekly 5/18/22 through 6/8/22 for 4 cycles.  Partial response.    6. Maintenance rituximab 8/17/22 through 12/14/22 for 3 cycles.  Progressive disease.  7. CT-guided biopsy right retrocaval node 2/22/23.  Pathology revealed grade 1-2 follicular lymphoma.  Additional molecular sequencing pending.  8. Bendamustine, obinutuzumab 3/8/23 through present.  Completed 2 cycles to date.  Partial response.    HPI:   Ms. Muriel Franklin is a 73 y.o. woman with follicular lymphoma.    Labs 11/19/14 revealed WBC 5.6, hemoglobin 14.1, platelets 207,000.    Has noted slightly enlarged right posterior cervical lymph node \"for years.\"  Later " noted lower right cervical lymph node in early January.  Lymph nodes not painful.   Did not notice any additional palpable lymph nodes outside of neck.    Seen by PCP 2/1/16 for routine physical exam.  Noted enlarged cervical lymph nodes.    Labs 2/1/16 revealed WBC 21.4, hemoglobin 13.5, platelets 158,000.  Creatinine 0.85, AST 42, ALT 48, total bilirubin 0.5.  IgG 875, IgA 37, IgM 134.    CT chest, abdomen, pelvis 2/2/16 revealed bilateral axillary lymphadenopathy.  2.2 x 1.3 cm right axillary lymph node.  0.8 x 1.1 cm subcarinal node.  1.0 x 1.1 cm pericardiophrenic lymph node.  Spleen within normal limits.  Retroperitoneal lymphadenopathy.  1.3 x 1.8 cm aortocaval lymph node.  Mesenteric lymphadenopathy up to 0.9 x 1.7 cm.  Pelvic lymphadenopathy.  1.1 x 1.6 cm right external iliac node.  1.3 x 2.3 cm left inguinal lymph node.    CT-guided core needle/FNA of right inguinal lymph node 2/10/16.  Pathology revealed B-cell lymphoma consisting of small lymphoid cells.  Flow cytometry revealed clonal CD20+, CD5-, CD23-, kappa light chain expressing B-cells. Definitive classification not possible given limited sample size however morphologically appears most consistent with low-grade follicular lymphoma or marginal zone lymphoma.    Peripheral blood flow cytometry 2/18/16 revealed an expansion of surface kappa restricted CD5- CD10(subset)+ CD19+ CD20+ CD23- CD38(subset)+ B cells [64% of total events] diagnostic of a mature B cell neoplasm. The differential diagnosis based on the flow immunophenotype is wide and includes marginal zone lymphoma, lymphoplasmacytic lymphoma (LPL), and follicular lymphoma. The immunophenotype is not typical for chronic lymphocytic leukemia or mantle cell lymphoma but cannot be excluded on the basis of this examination alone. Cytogenetics revealed +12 within normal limits 2/200 cells.  del(13),-13,DEVONTE,Tp53, t(11;14) all negative 0/200 cells.    PET/CT 2/19/16 revealed FDG avid bilateral  cervical, axillary, mediastinal, cardiophrenic, retroperitoneal, mesenteric, portal, pelvic, and inguinal lymphadenopathy, reflecting the known lymphoma. The spleen demonstrates FDG uptake above that of liver, suspicious for splenic involvement. Bone marrow uptake is mildly increased, which could reflect hematopoietic response to anemia or marrow involvement. 1.4 x 2.2 cm ight inguinal lymph node (image 237, series 4), SUV 8.9. 1.4 x 4.1 cm right obturator lymph node (image 2019, series 4), SUV 9.5. 2.2 x 2.6 cm left para-aortic lymph node conglomerate (image 170, series 4), SUV 8. 1.5 x 2.2 cm 3ight axillary lymph node (image 84, series 4), SUV 8.1. 3.7 x 10.8 x 12 cm spleen, SUV 4.2.  Bone marrow uptake , as measured in the right ilium for reference, has a maximal SUV of 3.2. Tracer activity is otherwise physiologic.    Right inguinal lymph node excisional biopsy 3/14/16 revealed follicular lymphoma, grade 1-2 of 3, in a follicular pattern. The centroblasts on average are <15 per high power field. Diffuse areas or sheets of large cells are not seen.  Immunohistochemical stains performed with adequate controls on block 1A show that the neoplastic cells are positive for CD20, PAX5, CD79, BCL2, and CD10 and are negative for CD5 and BCL1. CD21 and CD23 highlight residual follicular dendritic cell meshworks. The Ki-67 proliferation index is 30-50% within the follicles. Scattered small CD3+ CD5+ T cells are present in the background. Flow cytometry demonstrates a surface kappa restricted CD5- CD10+ CD19+ CD20+ CD23(small subset variable)+ CD38+ B cell population that represents 60% of total events. The findings are diagnostic of a mature CD10+ B cell neoplasm.    PET/CT 2/6/19 revealed resolution of diffuse splenic uptake and retroperitoneal and mesenteric lymphadenopathy, and improved FDG-avid axillary, cervical, and inguinal adenopathy.  There is overall progression with new FDG-avid serosal implants along the liver.  Although improved, there is still substantial residual lymphoma in the axillary, inguinal, and pelvic nodes.  Left axillary lymph node on CT image 55 2.4 x 1.2 cm, previously 2.2 x 0.9 cm; max SUV 4.6, previously 7.6. Right retropectoral lymph node on CT image 42 1.3 x 1.2 cm, previously 1.2 x 1.1 cm, max SUV 6.4, previously 6.7. Spleen 10.0 x 3.2 cm, previously 10.5 x 4.1 cm; max SUV 2.3 which is comparable to liver, previously diffusely abnormal with maximum SUV 4.2.  New focal uptake along the right hepatic lobe on CT image 107; max 5.4, previously background liver. There are overall more than 10 new peripheral lesions along the surface of the liver. Left para-aortic mateusz conglomeration on CT image 127 1.1 x 0.6 cm, previously 1.6 x 1.2 cm; max SUV background blood pool, previously 5.9.     U/S-guided biopsy left hepatic lesion 2/12/19.  Pathology revealed follicular lymphoma, WHO grade 1-2 of 3, predominantly (>75%) follicular.  The apparently distinct kappa and lambda expressing subsets of CD10+ B cells detected by flow cytometry (suggesting a biclonal process) cannot be distinguished histologically.  Immunohistochemical stains show that neoplastic cells are CD20+ CD79a+ CD10+ BCL2+ BCL6(variable)+ MUM1(subset)+ cyclinD1- B cells with a proliferative index (Ki67+) that ranges from 15-35%. CD21 stains somewhat disrupted follicular dendritic cell meshworks. CD3 and CD5 highlight small background T cells.  Flow cytometry shows an expansion (65% of total) of surface kappa-biased CD19+ CD20+ CD10+ CD5- B cells with a kappa:lambda ratio of 3.9, and 11% CD3+ T cells with a CD4:CD8 ratio of 1.7.     CT chest, abdomen, pelvis 1/7/22 revealed redemonstration of multistation bilateral axillary, retropectoral, supraclavicular and mediastinal lymphadenopathy, in keeping with known lymphoma.  Right axillary image 39/6, measuring 15 x 10 mm, previously 14 x 7 mm;  Left axillary image 33/6, measuring 23 x 12 mm, previously 17  x 10 mm; Right paracardiac image 87/6, measuring 15 x 10 mm, previously 14 x 8 mm.  Lymphomatous lesions in liver, similar to minimally increased in size, as remeasured on series 13 as follows: Segment 3 mass currently measures 2.5 x 5.7 cm (image 49) previously 2.8 x 5.5 cm.  Segment 7 mass currently measures 2.3 x 4 cm (image 33) previously 2 x 4.3 cm.  Segment 6 mass currently measures 1.6 x 2.4 cm (image 61) previously 1.5 x 2 cm.  Other lesions are also stable to minimally increased. No new suspicious lesion.  Abdominal, retroperitoneal, and mild pelvic lymphadenopathy is overall slightly increased, with index lymph nodes measured and compared on series 13.  A portal caval node measures 1.4 x 4 cm (image 47) previously 1.2 x 3.5 cm.  An aortocaval node measures 1.5 x 2.4 cm (image 57) previously 1.4 x 2 cm.  An lymph node anterior to the aorta measures 1.3 x 1.7 cm (image 57) previously 1 x 1.4 cm.  A left common iliac node measures 1.4 x 2.7 cm (image 79) previously 1 x 2.3 cm     CT chest, abdomen, pelvis 5/10/22 revealed multistation bilateral axillary, retropectoral, supraclavicular, mediastinal, and paravertebral adenopathy for example: Left paravertebral 40 x 15 mm, image 145/4, previously consisting of discrete 7 mm nodules.  Left paravertebral 29 x 18 mm mateusz mass image 185/4 previously 15 x 9 mm. Right paravertebral 21 x 11 mm mass image 185/4, previously 21 x 10 mm. Right cardiophrenic 17 x 17 mm lymph node image 174/4, previously 15 x 10 mm. Right axillary 17 x 10 mm lymph node image 73/4, previously 15 x 10 mm. High left axillary 14 x 13 mm lymph node, previously 10 x 7 mm. Left axillary 14 x 10 mm lymph node image 63/4, previously 11 x 9 mm. Left supraclavicular 9 x 9 mm lymph node image 20/4, previously 8 x 8 mm. Left subaortic 16 x 11 mm lymph node, image 79/4 previously 15 x 10 mm.  Redemonstrated lymphomatous lesions in the right and left hepatic lobes, with representative measurements as  follows (series 13):  5 x 2.9 cm segment 3/4 B (image 41), previously 5 x 2.5 cm.  8.9 x 2.8 cm segment 2/4 A (image 28), previously 8.9 x 2.9 cm.  5.3 x 2.5 cm segment 7 (image 36), previously 4 x 2.3 cm).  2.4 cm cyst in segment 8/7. Patent portal and hepatic veins.  Increased bulky abdominopelvic lymphadenopathy showing significant interval progression both increasing size and number. There are numerous small and enlarged lymph nodes in periaortic space and aorticocaval space from upper to lower abdomen. Representative measurements as follows (series 13):  Donna hepatis lymph node conglomerate measuring 5 x 4.5 cm (image 31), previously 5.2 x 2.4 cm.  Aortocaval lymph node measuring 3.3 x 2.9 cm (image 55), previously 2.4 x 1.5 cm.  Left common iliac lymph node measuring 5.6 x 2.7 cm (image 78), previously 2.7 x 1.4 cm.    Rituximab weekly 5/18/22 through 6/8/22 for 4 cycles.  Partial response.      MRI brain 5/24/22 revealed 0.7 cm meningioma along the left aspect of the falx cerebri, abutting the adjacent inferior sagittal sinus with mild adjacent mass effect without any significant vasogenic edema. No acute infarction or intracranial hemorrhage.    CT chest, abdomen, pelvis 7/6/22 revealed multistation bilateral axillary, retropectoral, supraclavicular, mediastinal, and paravertebral adenopathy. Left lower paravertebral 38 x 15 mm, image on 2/142, grossly unchanged.  Improved the right cardiophrenic measuring 6 x 5 mm on 2/174 (previous 17 x 17 mm).   Improved the right axillary measuring 9 x 4 mm on 2/63 (previous 17 x 10 mm).  High left axillary 14 x 13 mm lymph node, previously 10 x 7 mm.  Improved multiple left axillary lymph nodes measuring 6 x 3 mm on 2/68 (previous 14 x 10 mm).  Similar appearance of left supraclavicular 9 x 9 mm lymph node image on 2/24.  Improved left subaortic lymph node measuring 8 x 6 mm on 2/78 (16 x 11 mm).  Interval decrease in size of multiple previously seen lymphomatous  lesions within the liver.  Segment 3 lesion measuring 1.8 x 2.9 cm (series 13 image 42), previously 2.8 x 5.0 cm.  Segment 2 lesion measuring 2.2 x 3.5 cm (series 13, image 27), Previously 2.8 x 8.9 cm.  Segment 7 lesion measuring 1.3 x 3.1 cm (series 13, image 30), previously 2.5 x 5.3 cm.  Unchanged segment 8/7 cyst measuring 2.5 cm. Additional scattered stable subcentimeter hypoattenuating lesions that are too small to characterize.  Overall slight decrease in size of bulky abdominal and retroperitoneal lymphadenopathy. Donna hepatis lymph node measuring 5.5 x 3.8 cm (series 13, image 35), previously 5.9 x 4.7 cm.  Aortocaval lymph node measuring 2.0 x 3.3 cm (series 13, image 61), previously 2.9 x 3.3 cm.  Left common iliac lymph node measuring 4.8 x 2.9 cm (series 13, image 84), previously 5.6 x 3.7 cm.  No definite new or significantly enlarging lymph nodes are seen.    Maintenance rituximab 8/17/22 through 12/14/22 for 3 cycles.  Progressive disease.    CT chest, abdomen, pelvis 2/14/23 revealed multistation subcentimeter mediastinal, hilar and axillary lymph nodes are stable or decreased in the interval, currently not enlarged by CT size criteria.  Mild left supraclavicular lymphadenopathy is minimally changed in the interval, for example measuring 19 x 11 mm.  Redemonstration of enhancing soft tissue attenuation mass in the left lower paravertebral region of the thoracic spine, adjacent to the vertebral bodies T9-T12, for example measuring up to 47 x 18 mm on image 157/2, previously 45 x 16 mm.  Continued decrease in size of multiple previously seen lymphomatous lesions in the liver with representative examples as follows (series 13): Segment 3 lesion measures 1.3 x 2.1 cm (image 33) previously 1.8 x 2.9 cm Segment 2 lesion is somewhat difficult to visualize but probably measures approximately 1.1 x 1.9 cm (image 21) previously 2.2 x 3.5 cm.  Segment 7 lesion measures 1 x 1.5 cm (image 34) previously 1.3 x  3.1 cm.  No new or enlarging hepatic lesions identified.  Marked increase in size and number of bulky abdominal and retroperitoneal lymphadenopathy, now markedly enlarging the retroperitoneum with mass effect on the liver, pancreas, stomach, small bowel. Conglomerate lymphadenopathy encases the aorta, IVC, renal arteries and veins and common iliac vessels, with a conglomerate measuring approximately 5.7 x 10.7 x 16.5 cm. Previously measured individual lymph nodes are as follows follows:  Donna hepatis lymph node measuring 8.1 x 6.7 cm (series 13, image 33), previously 5.5 x 3.8 cm.  Aortocaval lymph node is somewhat difficult to distinguish as the borders coalescent with adjacent lymphadenopathy that currently measures 3.4 x 5.5 cm (image 63 of series 13) previously 2.3 x 3.3 cm.  Left common iliac mateusz conglomerate measures 5.2 x 6.7 cm (image 83 series 13) previously 2.9 x 4.8 cm.  There are also several newly enlarged bilateral iliac chain and mesenteric lymph nodes, for example a new 1.5 x 1.1 cm anterior mesenteric lymph node (series 13, image 62), previously 0.6 cm., And a new left external iliac lymph node measures 1.6 x 2.5 cm (image 122 series 13) previously 0.9 x 1.1 cm.  There is heterogeneous hypodensity within the inferior IVC extending into the common iliac vessels which is favored to be due to flow related/mixing artifact rather than thrombus (series 13, image 76). There is hypodensity within the right gonadal vein with peripheral enhancement, see image 76 of series 13 which is favored to be due to occlusive thrombus.     CT-guided biopsy right retrocaval node 2/22/23.  Pathology revealed grade 1-2 follicular lymphoma.  Additional molecular sequencing pending.    PET/CT 2/28/23 revealed markedly FDG avid lymphadenopathy involving the left supraclavicular, bilateral axillary, left posterior mediastinal, bilateral pelvic sidewall, and bilateral external iliac mateusz stations as well as large  conglomerate upper abdominal, retroperitoneal, and mesenteric mateusz masses concerning for transformation. There are small FDG avid bilateral axillary and inguinal nodes which are also likely involved. Left lower jugular lymph node (CT 50) 1.9 x 1.5 cm, similar to that seen on recent chest CT but increased from 7/6/2022 when it measured 1.0 x 1.1 cm; Max SUV 10.1.  Left posterior mediastinal/subpleural nodes (CT image 107) 3.6 x 1.4 cm similar anatomically since at least 7/6/2022; Max SUV 8.6. Gastrohepatic mateusz conglomerate (CT image 125) 2.1 x 5.4 cm, similar compared to most recent CT but increased from 7/6/2022 when it measured 5.5 x 2.6 cm; Max SUV 13.  Left retroperitoneal mateusz conglomerate (CT image 182) 5.3 x 2.5 cm, similar to recent CT but decreased in size since 7/6/2022 when it measured approximately 3.3 x 1.8 cm; Max SUV 15.2. Left external iliac node (CT image 2 2) 2.5 x 1.5 cm, similar to most recent CT but slightly increased in size from 7/6/2022 when it measured 1.9 x 1.1 cm; Max SUV 11.7.  No definite FDG avid lesions in the liver. The segment 2, 3, and 7 lesions described on recent CT abdomen/pelvis do not demonstrate abnormal FDG avidity. There is a focus of FDG avidity overlying the lateral left lobe , max SUV of 5.1 (PET image 163); however, this may represent misregistration from an adjacent lymph node.    Bendamustine, obinutuzumab 3/8/23 through present.  Completed 2 cycles to date.  Partial response.    CT neck, chest, abdomen, pelvis 4/29/23 revealed decrease in size of the previously seen level 4 cervical lymph node, previously 1.9 cm, now 0.8 cm. No new or enlarging pathologic cervical lymph nodes by imaging criteria.  Left paravertebral mass, 157/2, 11 x 26 mm, previously 18 x 47 mm.  Continued decrease in size of multiple previously seen lymphomatous lesions in the liver with representative examples as follows (series 13):  Segment 3 lesion currently measures 6 mm (image 46)  previously 1.3 x 2.1 cm.  Segment 2 lesion that previously measured 1.1 x 1.9 cm could no longer be confidently identified.  Segment 7 lesion currently measures 0.8 x 1.4 cm (image 35) previously 1 x 1.5 cm.  No new or enlarging hepatic lesions identified.  Marked improvement in abdominal and retroperitoneal lymphadenopathy, with index measurements as follows (series 13).  Conglomerate retroperitoneal lymphadenopathy that surrounds aorta, IVC, common iliac vessels and renal vessels currently measures 2.5 x 6.8 cm on image 70 (inclusive of aorta/IVC) previously 6.1 x 13.1 cm.  Donna hepatis lymph node currently measures 2.2 x 3.4 cm (image 38) previously 8.1 x 6.7 cm.  Aortocaval lymph node is somewhat difficult to distinguish as the borders coalescent with adjacent lymphadenopathy currently measures 2.5 x 3.4 cm (image 66) previously 3.4 x 5.5 cm.  Left external iliac lymph node currently measures 0.7 x 1.2 cm (image 131) previously 1.6 x 2.5 cm.  No new or increasing lymphadenopathy in the abdomen or pelvis.  Interval decrease in size of small amount of nonocclusive thrombus within the right gonadal vein, image 76 series 13.      INTERVAL HISTORY:  Increased fatigue.  Worst over first week.  Intermittent headaches.  Weight stable.  Nausea better.  No fevers or chills.  No bleeding or bruising.  Intermittent constipation, although stools looser after oral contrast.  Intermittent abdominal cramping.  No night sweats.  No shortness of breath.  No epistaxis.  No gingival bleeding.    Past Medical History:   Diagnosis Date   • Follicular lymphoma (CMS-HCC) 02/10/2016   • Hyperlipidemia    • Hypertension    • TIA (transient ischemic attack)     x3       Past Surgical History:   Procedure Laterality Date   • BIOPSY/REMOVAL, LYMPH NODE(S) Right 3/14/2016    Procedure: BIOPSY/EXCISION LYMPH NODE OPEN SUPERFICIAL;  Surgeon: Steve Aponte MD;  Location: [facility];  Service: GENERAL   •  DELIVERY ONLY      x3   • HX  BREAST BIOPSY      Benign   • HX HIP REPLACEMENT Right 05/2017   • Repair of torn meniscus     • RP ABDOMEN BIOPSY IMG GUIDED  2/22/2023    RP ABDOMEN BIOPSY IMG GUIDED 2/22/2023 Louise Elizabeth MD [facility] GROUND CT   • RP LIVER BIOPSY IMG GUIDED  2/12/2019    RP LIVER BIOPSY IMG GUIDED 2/12/2019 [facility] GROUND US   • TOTAL KNEE REPLACEMENT      Both       Family History   Problem Relation Name Age of Onset   • CAD Father     • CAD Mother     • CAD Brother     • Cancer-Other Negative History         Social History     Socioeconomic History   • Marital status:      Spouse name: Not on file   • Number of children: Not on file   • Years of education: Not on file   • Highest education level: Not on file   Occupational History   • Occupation: retired teacher   Tobacco Use   • Smoking status: Never     Passive exposure: Never   • Smokeless tobacco: Never   Vaping Use   • Vaping status: Not on file   Substance and Sexual Activity   • Alcohol use: Yes     Alcohol/week: 11.7 standard drinks of alcohol     Types: 14 Glasses of wine per week   • Drug use: No   • Sexual activity: Not on file   Other Topics Concern   • Not on file   Social History Narrative   • Not on file       ALLERGIES:     1) Alcohol [Isopropyl Alcohol] - Blisters   2) Chloraprep One Step  3) Sulfa Antibiotics - Other reaction(s): thought to cause TIA          Other reaction(s): foot soreness and blistering of left foot after surgeryOther reaction(s): hematoma and blistering after surgery    MEDICATIONS:  1) Acyclovir 400 mg tablet, take 1 tablet by mouth every 12 hours.    2) Allopurinol 100 mg tablet, take 1 tablet by mouth every 12 hours.    3) Aspirin 81 mg or tabs, 1 tablet daily    4) Biotin 5000 mcg po tabs, take  by mouth.    5) Cholecalciferol (vitamin d) 1000 units po caps, none entered    6) Coenzyme q10 (coq10 po), take  by mouth.    7) Collagen hydrolysate powder,     8) Cranberry-vitamin c-probiotic (azo cranberry) 250-30  "mg po tabs, take  by mouth.    9) Doxylamine succinate, sleep, (unisom po), take  by mouth. take 1/2 tab at bedtime as needed (prn)    10) Gabapentin 300 mg capsule, up to 1 capsule 3 times daily for shingles pain    11) Irbesartan 75 mg tablet, take 1 tablet by mouth.    12) Levothyroxine 50 mcg tablet, take 1 tablet by mouth daily.    13) Losartan 25 mg tablet, take 1 tablet by mouth daily.    14) Magnesium chloride ec 64 mg tablet, take  by mouth.    15) Meclizine 25 mg tablet, take 1 tablet by mouth every 8 hours as needed (vertigo).    16) Melatonin 3 mg tablet, take 1 tablet by mouth daily at bedtime as needed.    17) Misc natural products (glucosamine chond complex/msm po), take  by mouth.    18) Multiple vitamins-minerals (multivitamin po), none entered    19) Ondansetron 8 mg tablet, take 1 tablet by mouth every 8 hours as needed (nausea).    20) Prochlorperazine 10 mg tablet, take 1 tablet by mouth every 8 hours as needed for nausea or vomiting.    21) Rosuvastatin 20 mg tablet, take 1 tablet by mouth.    22) Topiramate (topamax) 25 mg or tabs,     23) Tretinoin 0.1 % cream, apply daily at bedtime to face    24) Triamterene-hctz 37.5-25 mg per capsule, take 1 capsule by mouth daily. 1/2 tab qd    25) Turmeric po, take  by mouth daily.    26) Valacyclovir 1 g tablet, take 1 tablet by mouth every 8 hours x 7 days          REVIEW OF SYSTEMS:  As noted in History of Present Illness section.  All others are negative.    PHYSICAL EXAM:  /78   Pulse 75   Temp 97.9 °F (36.6 °C) (Oral)   Resp 16   Ht 5' 0.5\" (1.537 m)   Wt 127 lb (57.6 kg)   SpO2 96%   BMI 24.39 kg/m²   Constitutional: Oriented to person, place, and time.  Well-developed and well-nourished.  No distress.   Head: Normocephalic.  Atraumatic.   Ears: External ears normal.   Nose: Nose normal.   Mouth/Throat: Oropharynx is clear and moist.  No erythema or exudate.   Eyes: Conjunctivae normal.  EOM are normal. Pupils are equal, round, and " reactive to light.   Neck: Normal range of motion. Neck supple.   Lymph Nodes: No palpable inguinal lymphadenopathy.  1 cm left submandibular node.  1 cm left supraclavicular node.  Cardiovascular: Normal rate, regular rhythm.  No murmurs, rubs, or gallops.  Intact distal pulses.  Pulmonary/Chest: Effort normal and breath sounds normal.  No wheezes, rhonchi, or rales.   Abdominal: Soft. Bowel sounds are normal.  Non-tender.  Non-distended.  No hepatosplenomegaly.   Musculoskeletal: Normal range of motion.   Neurological: Alert and oriented to person, place, and time. Gait normal.   Skin: Skin is warm and dry.   Psychiatric: Affect normal.     LABORATORY REVIEW:    Hem Onc Labs Latest Ref Rng & Units 4/13/2023 4/18/2023 5/3/2023   WBC 4.0 - 11.0 THO/uL 25.5(C) 18.2(H) 6.5   HEMOGLOBIN 12.0 - 16.0 g/dL 13.2 13.0 13.3   HEMATOCRIT 36 - 46 % 41 40 40   PLATELETS 150 - 400 THO/uL 77(L) 174 95(L)   % Neutrophils % - 96.5 80.9   # Neutrophils 1.80 - 7.50 THO/uL - 17.60(H) 5.30   % LYMPHOCYTES % - 0.7 3.0   MEAN CELLULAR VOLUME 80 - 100 fL 89 89 90   GLUCOSE 70 - 99 mg/dL 94 109(H) 87   UREA NITROGEN 8 - 20 mg/dL 12 16 18   CREATININE 0.44 - 1.03 mg/dL 0.85 0.81 0.79   SODIUM 136 - 144 mmol/L 136 134(L) 138   POTASSIUM 3.6 - 5.1 mmol/L 3.7 Hemolyzed(A) 4.4   CHLORIDE 101 - 111 mmol/L 97(L) 97(L) 100(L)   CARBON DIOXIDE 22 - 32 mmol/L 32 28 29   CALCIUM 8.9 - 10.3 mg/dL 9.7 9.4 9.6   PROTEIN TOTAL 6.1 - 7.9 g/dL 6.5 7.0 6.4   ALBUMIN 3.5 - 5.1 g/dL 4.4 4.3 4.2   BILIRUBIN TOTAL 0.3 - 1.2 mg/dL 0.5 0.3 0.5   ALKALINE PHOSPHATASE 38 - 126 U/L 190(H) 124 73   BILITOTAL 0.3 - 1.2 mg/dL 0.5 0.3 0.5   AST 15 - 41 U/L 20 26 26   ALT 14 - 54 U/L 18 14 23   URIC ACID 2.6 - 8.0 mg/dL 4.2 3.0 -   PROTEIN TOTAL 6.1 - 7.9 g/dL 6.5 7.0 6.4   Some recent data might be hidden       RADIOLOGY REVIEW:  Noted in HPI.  I personally reviewed 2/19/16, 2/28/23 PET/CTs.  I personally reviewed 5/10/22, 2/14/23, 4/29/23 CTs.  I personally reviewed  11/7/16 MRI.    PATHOLOGY REVIEW:  Noted in HPI.  I personally reviewed 2/18/16 peripheral blood smear.  Increased atypical lymphocytes with numerous smudge cells.  No leukemic blasts.  Normal RBC morphology.  Occasional giant platelets.    ASSESSMENT AND PLAN:  Follicular lymphoma (CMS-HCC)    Status of primary tumor: responding based on imaging    Distant mets ever?  N/A (hematologic malignancy)    Overall clinical status: no change    ECOG performance status: 1    Plan of care: active therapy without curative intent    Change in treatment: no      Based on her findings she has grade 1-2 follicular lymphoma.  She appears to have diffuse lymphadenopathy as well as marrow involvement based on her circulating clonal population.  She later developed several sites of liver involvement.    I reviewed with her the general prognosis and treatment strategy of follicular lymphoma which often is viewed as a chronic condition with periods of relapses that may require systemic therapy to induce remission.  Her 2/6/19 PET/CT appeared to be stable with regard to her lymphoma although there was some subtle progression of several hepatic lesions since 3/12/18.  Biopsy done 2/12/19 suggested these were consistent with follicular lymphoma.  Her 5/10/22 CT suggested progressive disease, particularly involving her abdominopelvic lymph nodes which have become more bulky.  I reviewed with her the role for systemic therapy with the goal of reducing her disease burden and extending her life.  In many cases, indolent lymphomas may be closely observed for a period of time prior to initiating chemotherapy.  Indications for treatment may include progressive radiographic disease, associated cytopenias, threatened end-organ damage, or progressive symptoms such as night sweats, fevers, severe fatigue, or infections.    Based on her recent progression I would recommend initiating systemic treatment.    She completed a course of single agent  rituximab 375 mg/m2 weekly for 4 infusions.   This is a monoclonal antibody targeting CD-20 which is expressed on B-cell lymphomas.  Side effects may include hypersensitivity reactions, fatigue, cytopenias, risk for infection, and tumor lysis syndrome among others.  She signed informed consent for treatment.  Her 7/6/22 CT revealed a partial response with improvement of her hepatic lesions and diffuse lymphadenopathy.  She transitioned to maintenance rituximab every 2 months for a planned course of 12 infusions.   Her 2/14/23 CT suggested continued improvement of her hepatic lesions however significant progression of her bulky retroperitoneal lymphadenopathy as well as several other mateusz sites.    Biopsy of her right retrocaval node 2/22/23 was consistent with grade 1-2 follicular lymphoma.  I would recommend treatment with bendamustine combined with obinutuzumab for up to 6 cycles.  The [employer] trial evaluated 396 patients with rituximab-refractory indolent NHL (81 percent FL) who were randomly assigned to six cycles of bendamustine alone versus six cycles of bendamustine plus obinutuzumab (NIKKIE), followed by obinutuzumab maintenance for two years. The addition of obinutuzumab improved PFS (median 26 versus 14 months) and OS (HR 0.67; 95% CI 0.47-0.96).  Bendamustine is given 90 mg/m2 days 1 and 2 of a 28-day cycle.  Obinutuzumab is given 1,000 mg/m2 days 1, 8, 15 if cycle 1 and then day 1 cycles 2-6.    Potential side effects may include fatigue, myelosuppression, nausea, vomiting, diarrhea, hepatotoxicity, and hypersensitivity reactions among others.  She signed informed consent for treatment.  Her 4/29/23 CT suggests a partial response to treatment including her known sites of disease.  She will continue treatment with bendamustine and obinutuzumab.  She will begin her 3rd cycle today.  We will reduce allopurinol to 100 mg daily to reduce her risk for tumor lysis.  We may discontinue this at the time of her next  visit if she continues to do well.  She will receive Neulasta to reduce her risk for febrile neutropenia.  We will repeat her CT imaging following her 6th cycle of treatment.  I will follow up with her in 4 weeks when she is due to start her 4th cycle.    Nausea.  Associated with chemotherapy.    We will add Aloxi and fosaprepitant for delayed CINV prophylaxis.  She will continue ondansetron and prochlorperazine as needed for breakthrough nausea.    Elevated liver function tests.  Improved.  We will follow her LFTs.     Hypertension.  Stable.  She will continue her current management.    Thrombocytopenia.  Stable.  Associated with chemotherapy.  We will follow her CBC.  I cautioned her regarding signs and symptoms of bleeding.  She will resume ASA as her platelets are greater than 50,000.    Meningioma.  Repeat MRI suggested this is stable.  She will follow up with Neurosurgery.    Shingles.  This may be associated with her chronic lymphopenia.  She will continue acyclovir 400 twice daily for prophylaxis.    Hypothyroid.  Stable.  She will continue her current management.

## 2023-05-02 NOTE — PATIENT INSTRUCTIONS
Covid booster in January                                  Your Personalized Prevention Plan Services (PPPS)    Preventive Services Checklist (Assumes Average Risk Unless Otherwise Noted):    Health Maintenance Topics with due status: Overdue       Topic Date Due    Hepatitis B Vaccines Never done    Zoster Vaccine 06/22/2021    COVID-19 Vaccine 06/22/2022    Medicare Annual Wellness Visit 09/15/2022     Health Maintenance Topics with due status: Not Due       Topic Last Completion Date    DTaP, Tdap, and Td Vaccines 02/01/2016    DEXA Scan 09/27/2021    Colorectal Cancer Screening 10/08/2021    Breast Cancer Screening 09/13/2022    Depression Screening 12/07/2022     Health Maintenance Topics with due status: Completed       Topic Last Completion Date    Hepatitis C Screening 06/20/2019    Pneumococcal (65 years and older) 08/26/2019    Influenza Vaccine 10/25/2022    Annual Falls Risk Screening 12/07/2022     Health Maintenance Topics with due status: Aged Out       Topic Date Due    Meningococcal ACWY Aged Out    HIB Vaccines Aged Out    IPV Vaccines Aged Out    HPV Vaccines Aged Out       You May Be Eligible for These Additional Preventive Services   (Assumes Average Risk Unless Otherwise Noted)  Diabetes Screening Any 1 risk factor: hypertension, dyslipidemia, obesity, high glucose; or Any 2 risk factors: >=64yo, overweight, family history diabetes (covered every 6 months)   Hepatitis C Screening Any 1 risk factor: 1) blood transfusion before 1992,   2) current or past injection drug use (annually for high risk; if born between 0340-[birth year]34, see above for status).   Vaccine: Hepatitis B As necessary if at-risk: hemophilia, ESRD, diabetes, living with individual infected with hep B, healthcare worker with frequent contact with blood/bodily fluids (series covered once)   Sexually Transmitted Diseases (STDs) As necessary chlamydia, gonorrhea, syphilis, hepatitis B (covered annually)  HIV if any 1 risk factor present:  1) <14yo or >64yo and at increased risk or 2) 15-64yo and ask for it (covered annually)   Lung Cancer Screening Low dose chest CT if all three risk factors: 1) 50-78yo, 2) smoker or quit within last 15y, 3) >=20 pack years (covered annually).  No results found for this or any previous visit.       Cholesterol Screening Both risk factors: 1) >=19yo and 2)  increased risk coronary artery disease (covered every 5 years).     Breast Cancer Screening Covered once 35-40yo, annually >=39yo (if >=49yo, see above for status).         Health Risk Factors with Personalized Education:  ----------------------------------------------------------------------------------------------------------------------  Controlling Your Blood Pressure  Maintain a normal weight (body mass index between 18.5 and 24.9).  Eat more fruit, vegetables and low-fat dairy.  Eat less saturated fat and total fat.  Lower your sodium (salt) intake.  Try to stay under 1500 mg per day, but if you cannot get your intake to be that low, at least lower it by 1000 mg.  Stay active.  Try to get at least 90 to 150 minutes of exercise per week.  Try brisk walking, swimming, bicycling or dancing.  Limit alcohol intake.  When you do consume alcohol, drink no more than 1 drink per day.  If you have been prescribed medication, take it regularly and exactly as prescribed.  Let your PCP know if you have any problems or questions about your medication.  Check your blood pressure at home or at the store.  Write down your readings and share them with your PCP  ----------------------------------------------------------------------------------------------------------------------  Controlling Your Cholesterol  Reduce the amount of saturated and trans fat in your diet.  Limit intake of red meat.  Consume only low-fat or non-fat/skim dairy.  Limit fried food.  Cook with vegetable oils.  Reduce your intake of sugary foods, sugary drinks and alcohol.  Eat a diet high in fruit,  vegetables and whole grains.  Get protein from fish, poultry and a small portion of nuts.  Stay active.  Try to get at least 90 to 150 minutes of exercise per week.  Try brisk walking, swimming, bicycling or dancing.  Maintain a healthy weight by balancing your diet and exercise.  If you have been prescribed medication, take it regularly and exactly as prescribed. Let your PCP know if you have any problems or questions about your medication.  It’s important to know your cholesterol numbers.  When recommended by your PCP, get the cholesterol blood test.  ----------------------------------------------------------------------------------------------------------------------  Controlling Your Osteopenia, Strengthening Your Bones  Try to get at least 90 to 150 minutes of weight-bearing exercise per week.  Ensure intake of at least 1200mg of calcium per day.  Eat foods high in calcium like milk and other dairy, green vegetables, fruit, canned fish with soft and edible bones, nuts, calcium-set tofu.  Some foods are calcium-fortified, like bread, cereal, fruit juices and mineral water.  Help your body make vitamin D by getting 10-15 minutes per day of sunlight.    Ensure intake of at least 600IU of vitamin D per day.  Eat foods high in vitamin D like oily fish (salmon, sardines, mackerel) and eggs.  Some foods are fortified with vitamin D, like dairy and cereals.  Avoid high amounts of caffeine and salt, since they can cause the body to loose calcium.  Limit alcohol intake, since it is associated with weaker bones and is associated with falls and fractures.  Limit intake of fizzy drinks.  If you have been prescribed medication, take it regularly and exactly as prescribed.  Let your PCP know if you have any problems or questions about your medication  ----------------------------------------------------------------------------------------------------------------------  Reducing Your Risk of Falls  Tell your PCP if any of your  medications make you feel tired, dizzy, lightheaded or off-balance.  Maintain coordination, flexibility and balance by ensuring regular physical activity.  Limit alcohol intake to 1 drink per day.  Consider avoiding all alcohol intake.  Ensure good vision.  Visit an ophthalmologist or optometrist regularly for vision screening or to make sure your glasses / contact lens prescription is correct.  If you need glasses or contacts, wear them.  When you get new glasses or contacts, take time to get used to them.  Do not wear sunglasses or tinted lenses when indoors.  Ensure good hearing.  Have your hearing checked if you are having trouble hearing, or family and friends think you cannot hear them.  If you need a hearing aid, be sure it fits well and wear it.  Get enough rest.  Ensure about 7-9 hours of sleep every day.  Get up slowly from your bed or chairs.  Do not start walking until you are sure you feel steady.  Wear non-skid, rubber-soled, low-heeled shoes.  Do not walk in socks, or in shoes and slippers with smooth soles.  If your PCP or therapist recommends using a cane or walker, use it regularly.  Make your home safer.  Increase lighting throughout the house, especially at the top and bottom of stairs.  Ensure lighting is easily turned on when getting up in the middle of the night.  Make sure there are two secure rails on all stairs.  Install grab bars in the bathtub / shower and near the toilet.  Consider using a shower chair and / or a hand-held shower.  Spread sand or salt on icy surfaces.  Beware of wet surfaces, which can be icy.  Tell your PCP if you have fallen.   Electrodesiccation Text: The wound bed was treated with electrodesiccation after the biopsy was performed.

## 2023-05-15 ENCOUNTER — APPOINTMENT (EMERGENCY)
Dept: RADIOLOGY | Facility: HOSPITAL | Age: 73
DRG: 300 | End: 2023-05-15
Payer: MEDICARE

## 2023-05-15 ENCOUNTER — HOSPITAL ENCOUNTER (INPATIENT)
Facility: HOSPITAL | Age: 73
LOS: 2 days | Discharge: HOME | DRG: 300 | End: 2023-05-17
Attending: EMERGENCY MEDICINE | Admitting: HOSPITALIST
Payer: MEDICARE

## 2023-05-15 DIAGNOSIS — I82.613: ICD-10-CM

## 2023-05-15 DIAGNOSIS — J18.9 PNEUMONIA OF LEFT LOWER LOBE DUE TO INFECTIOUS ORGANISM: Primary | ICD-10-CM

## 2023-05-15 PROBLEM — R50.9 FEVER: Status: ACTIVE | Noted: 2023-05-15

## 2023-05-15 LAB
ALBUMIN SERPL-MCNC: 3.9 G/DL (ref 3.4–5)
ALP SERPL-CCNC: 122 IU/L (ref 35–126)
ALT SERPL-CCNC: 19 IU/L (ref 11–54)
ANION GAP SERPL CALC-SCNC: 9 MEQ/L (ref 3–15)
ANISOCYTOSIS BLD QL SMEAR: ABNORMAL
AST SERPL-CCNC: 23 IU/L (ref 15–41)
BASOPHILS # BLD: 0.13 K/UL (ref 0.01–0.1)
BASOPHILS NFR BLD: 0.6 %
BILIRUB SERPL-MCNC: 0.9 MG/DL (ref 0.3–1.2)
BUN SERPL-MCNC: 13 MG/DL (ref 8–20)
CALCIUM SERPL-MCNC: 9.5 MG/DL (ref 8.9–10.3)
CHLORIDE SERPL-SCNC: 96 MEQ/L (ref 98–109)
CO2 SERPL-SCNC: 27 MEQ/L (ref 22–32)
CREAT SERPL-MCNC: 0.7 MG/DL (ref 0.6–1.1)
DIFFERENTIAL METHOD BLD: ABNORMAL
EOSINOPHIL # BLD: 0.06 K/UL (ref 0.04–0.36)
EOSINOPHIL NFR BLD: 0.3 %
ERYTHROCYTE [DISTWIDTH] IN BLOOD BY AUTOMATED COUNT: 18 % (ref 11.7–14.4)
GFR SERPL CREATININE-BSD FRML MDRD: >60 ML/MIN/1.73M*2
GLUCOSE SERPL-MCNC: 113 MG/DL (ref 70–99)
HCT VFR BLDCO AUTO: 41.4 % (ref 35–45)
HGB BLD-MCNC: 13.7 G/DL (ref 11.8–15.7)
IMM GRANULOCYTES # BLD AUTO: 0.69 K/UL (ref 0–0.08)
IMM GRANULOCYTES NFR BLD AUTO: 3.3 %
LACTATE SERPL-SCNC: 1.9 MMOL/L (ref 0.4–2)
LYMPHOCYTES # BLD: 0.15 K/UL (ref 1.2–3.5)
LYMPHOCYTES NFR BLD: 0.7 %
MCH RBC QN AUTO: 30.7 PG (ref 28–33.2)
MCHC RBC AUTO-ENTMCNC: 33.1 G/DL (ref 32.2–35.5)
MCV RBC AUTO: 92.8 FL (ref 83–98)
MONOCYTES # BLD: 1.13 K/UL (ref 0.28–0.8)
MONOCYTES NFR BLD: 5.4 %
NEUTROPHILS # BLD: 18.88 K/UL (ref 1.7–7)
NEUTS SEG NFR BLD: 89.7 %
NRBC BLD-RTO: 0 %
OVALOCYTES BLD QL SMEAR: ABNORMAL
PDW BLD AUTO: 11.7 FL (ref 9.4–12.3)
PLAT MORPH BLD: NORMAL
PLATELET # BLD AUTO: 134 K/UL (ref 150–369)
PLATELET # BLD EST: ABNORMAL 10*3/UL
POTASSIUM SERPL-SCNC: 3.4 MEQ/L (ref 3.6–5.1)
PROT SERPL-MCNC: 7 G/DL (ref 6–8.2)
RBC # BLD AUTO: 4.46 M/UL (ref 3.93–5.22)
SODIUM SERPL-SCNC: 132 MEQ/L (ref 136–144)
WBC # BLD AUTO: 21.04 K/UL (ref 3.8–10.5)

## 2023-05-15 PROCEDURE — 99223 1ST HOSP IP/OBS HIGH 75: CPT | Performed by: HOSPITALIST

## 2023-05-15 PROCEDURE — 63700000 HC SELF-ADMINISTRABLE DRUG: Performed by: HOSPITALIST

## 2023-05-15 PROCEDURE — 96368 THER/DIAG CONCURRENT INF: CPT

## 2023-05-15 PROCEDURE — 99285 EMERGENCY DEPT VISIT HI MDM: CPT | Mod: 25

## 2023-05-15 PROCEDURE — 63600000 HC DRUGS/DETAIL CODE: Performed by: HOSPITALIST

## 2023-05-15 PROCEDURE — 96365 THER/PROPH/DIAG IV INF INIT: CPT

## 2023-05-15 PROCEDURE — 85025 COMPLETE CBC W/AUTO DIFF WBC: CPT | Performed by: EMERGENCY MEDICINE

## 2023-05-15 PROCEDURE — 96376 TX/PRO/DX INJ SAME DRUG ADON: CPT

## 2023-05-15 PROCEDURE — 25800000 HC PHARMACY IV SOLUTIONS: Performed by: HOSPITALIST

## 2023-05-15 PROCEDURE — 21400000 HC ROOM AND CARE CCU/INTERMEDIATE

## 2023-05-15 PROCEDURE — 36415 COLL VENOUS BLD VENIPUNCTURE: CPT | Performed by: EMERGENCY MEDICINE

## 2023-05-15 PROCEDURE — 63700000 HC SELF-ADMINISTRABLE DRUG: Performed by: PHYSICIAN ASSISTANT

## 2023-05-15 PROCEDURE — 25800000 HC PHARMACY IV SOLUTIONS: Performed by: PHYSICIAN ASSISTANT

## 2023-05-15 PROCEDURE — 71045 X-RAY EXAM CHEST 1 VIEW: CPT

## 2023-05-15 PROCEDURE — 63600000 HC DRUGS/DETAIL CODE: Performed by: PHYSICIAN ASSISTANT

## 2023-05-15 PROCEDURE — 93970 EXTREMITY STUDY: CPT

## 2023-05-15 PROCEDURE — 80053 COMPREHEN METABOLIC PANEL: CPT | Performed by: EMERGENCY MEDICINE

## 2023-05-15 PROCEDURE — 87040 BLOOD CULTURE FOR BACTERIA: CPT | Performed by: EMERGENCY MEDICINE

## 2023-05-15 PROCEDURE — 83605 ASSAY OF LACTIC ACID: CPT | Performed by: EMERGENCY MEDICINE

## 2023-05-15 RX ORDER — DEXTROSE 50 % IN WATER (D50W) INTRAVENOUS SYRINGE
25 AS NEEDED
Status: DISCONTINUED | OUTPATIENT
Start: 2023-05-15 | End: 2023-05-17 | Stop reason: HOSPADM

## 2023-05-15 RX ORDER — ACETAMINOPHEN 325 MG/1
650 TABLET ORAL ONCE
Status: COMPLETED | OUTPATIENT
Start: 2023-05-15 | End: 2023-05-15

## 2023-05-15 RX ORDER — ROSUVASTATIN CALCIUM 40 MG/1
40 TABLET, COATED ORAL NIGHTLY
Status: DISCONTINUED | OUTPATIENT
Start: 2023-05-16 | End: 2023-05-17 | Stop reason: HOSPADM

## 2023-05-15 RX ORDER — PROCHLORPERAZINE MALEATE 10 MG
10 TABLET ORAL EVERY 6 HOURS PRN
COMMUNITY
End: 2023-11-28

## 2023-05-15 RX ORDER — HEPARIN SODIUM 5000 [USP'U]/ML
5000 INJECTION, SOLUTION INTRAVENOUS; SUBCUTANEOUS EVERY 8 HOURS
Status: DISCONTINUED | OUTPATIENT
Start: 2023-05-15 | End: 2023-05-17 | Stop reason: HOSPADM

## 2023-05-15 RX ORDER — LEVOFLOXACIN 5 MG/ML
750 INJECTION, SOLUTION INTRAVENOUS ONCE
Status: COMPLETED | OUTPATIENT
Start: 2023-05-15 | End: 2023-05-15

## 2023-05-15 RX ORDER — TRIAMTERENE/HYDROCHLOROTHIAZID 37.5-25 MG
0.5 TABLET ORAL EVERY MORNING
COMMUNITY
End: 2023-11-28

## 2023-05-15 RX ORDER — ACETAMINOPHEN 500 MG
5 TABLET ORAL NIGHTLY
Status: DISCONTINUED | OUTPATIENT
Start: 2023-05-16 | End: 2023-05-17 | Stop reason: HOSPADM

## 2023-05-15 RX ORDER — ALLOPURINOL 100 MG/1
100 TABLET ORAL DAILY
COMMUNITY
Start: 2023-03-30 | End: 2023-08-15

## 2023-05-15 RX ORDER — SODIUM CHLORIDE 9 MG/ML
INJECTION, SOLUTION INTRAVENOUS CONTINUOUS
Status: DISCONTINUED | OUTPATIENT
Start: 2023-05-15 | End: 2023-05-16

## 2023-05-15 RX ORDER — DEXTROSE 40 %
15-30 GEL (GRAM) ORAL AS NEEDED
Status: DISCONTINUED | OUTPATIENT
Start: 2023-05-15 | End: 2023-05-17 | Stop reason: HOSPADM

## 2023-05-15 RX ORDER — SODIUM CHLORIDE 9 MG/ML
800 INJECTION, SOLUTION INTRAVENOUS CONTINUOUS
Status: ACTIVE | OUTPATIENT
Start: 2023-05-15 | End: 2023-05-15

## 2023-05-15 RX ORDER — LEVOTHYROXINE SODIUM 50 UG/1
50 TABLET ORAL
Status: DISCONTINUED | OUTPATIENT
Start: 2023-05-16 | End: 2023-05-17 | Stop reason: HOSPADM

## 2023-05-15 RX ORDER — ACETAMINOPHEN 500 MG
5 TABLET ORAL NIGHTLY
Status: DISCONTINUED | OUTPATIENT
Start: 2023-05-16 | End: 2023-05-15

## 2023-05-15 RX ORDER — IBUPROFEN 200 MG
16-32 TABLET ORAL AS NEEDED
Status: DISCONTINUED | OUTPATIENT
Start: 2023-05-15 | End: 2023-05-17 | Stop reason: HOSPADM

## 2023-05-15 RX ORDER — SODIUM CHLORIDE 9 MG/ML
1000 INJECTION, SOLUTION INTRAVENOUS CONTINUOUS
Status: ACTIVE | OUTPATIENT
Start: 2023-05-15 | End: 2023-05-15

## 2023-05-15 RX ADMIN — ACETAMINOPHEN 650 MG: 325 TABLET ORAL at 17:18

## 2023-05-15 RX ADMIN — CEFAZOLIN 1 G: 1 INJECTION, POWDER, FOR SOLUTION INTRAMUSCULAR; INTRAVENOUS at 17:22

## 2023-05-15 RX ADMIN — SODIUM CHLORIDE 1000 ML: 9 INJECTION, SOLUTION INTRAVENOUS at 17:07

## 2023-05-15 RX ADMIN — LEVOFLOXACIN 750 MG: 750 INJECTION, SOLUTION INTRAVENOUS at 19:54

## 2023-05-15 RX ADMIN — HEPARIN SODIUM 5000 UNITS: 5000 INJECTION, SOLUTION INTRAVENOUS; SUBCUTANEOUS at 21:06

## 2023-05-15 RX ADMIN — ROSUVASTATIN CALCIUM 40 MG: 40 TABLET, COATED ORAL at 23:27

## 2023-05-15 RX ADMIN — Medication 5 MG: at 23:27

## 2023-05-15 RX ADMIN — SODIUM CHLORIDE 800 ML: 9 INJECTION, SOLUTION INTRAVENOUS at 17:23

## 2023-05-15 RX ADMIN — SODIUM CHLORIDE: 9 INJECTION, SOLUTION INTRAVENOUS at 19:57

## 2023-05-16 PROBLEM — L03.90 CELLULITIS: Status: ACTIVE | Noted: 2023-05-16

## 2023-05-16 PROBLEM — I82.613: Status: ACTIVE | Noted: 2023-05-16

## 2023-05-16 PROBLEM — E78.5 HYPERLIPIDEMIA: Status: ACTIVE | Noted: 2023-05-16

## 2023-05-16 PROBLEM — E83.42 HYPOMAGNESEMIA: Status: ACTIVE | Noted: 2023-05-16

## 2023-05-16 PROBLEM — E03.9 HYPOTHYROIDISM: Status: ACTIVE | Noted: 2023-05-16

## 2023-05-16 PROBLEM — E87.6 HYPOKALEMIA: Status: ACTIVE | Noted: 2023-05-16

## 2023-05-16 LAB
ANION GAP SERPL CALC-SCNC: 6 MEQ/L (ref 3–15)
BILIRUB UR QL STRIP.AUTO: NEGATIVE MG/DL
BUN SERPL-MCNC: 9 MG/DL (ref 8–20)
CALCIUM SERPL-MCNC: 9 MG/DL (ref 8.9–10.3)
CHLORIDE SERPL-SCNC: 105 MEQ/L (ref 98–109)
CLARITY UR REFRACT.AUTO: CLEAR
CO2 SERPL-SCNC: 25 MEQ/L (ref 22–32)
COLOR UR AUTO: COLORLESS
CREAT SERPL-MCNC: 0.5 MG/DL (ref 0.6–1.1)
ERYTHROCYTE [DISTWIDTH] IN BLOOD BY AUTOMATED COUNT: 17.6 % (ref 11.7–14.4)
GFR SERPL CREATININE-BSD FRML MDRD: >60 ML/MIN/1.73M*2
GLUCOSE SERPL-MCNC: 102 MG/DL (ref 70–99)
GLUCOSE UR STRIP.AUTO-MCNC: NEGATIVE MG/DL
HCT VFR BLDCO AUTO: 37.1 % (ref 35–45)
HGB BLD-MCNC: 12.5 G/DL (ref 11.8–15.7)
HGB UR QL STRIP.AUTO: NEGATIVE
KETONES UR STRIP.AUTO-MCNC: NEGATIVE MG/DL
LEUKOCYTE ESTERASE UR QL STRIP.AUTO: NEGATIVE
MAGNESIUM SERPL-MCNC: 1.6 MG/DL (ref 1.8–2.5)
MCH RBC QN AUTO: 31.1 PG (ref 28–33.2)
MCHC RBC AUTO-ENTMCNC: 33.7 G/DL (ref 32.2–35.5)
MCV RBC AUTO: 92.3 FL (ref 83–98)
NITRITE UR QL STRIP.AUTO: NEGATIVE
PDW BLD AUTO: 10.3 FL (ref 9.4–12.3)
PH UR STRIP.AUTO: 6.5 [PH]
PLATELET # BLD AUTO: 118 K/UL (ref 150–369)
POTASSIUM SERPL-SCNC: 3.7 MEQ/L (ref 3.6–5.1)
PROT UR QL STRIP.AUTO: NEGATIVE
RBC # BLD AUTO: 4.02 M/UL (ref 3.93–5.22)
SODIUM SERPL-SCNC: 136 MEQ/L (ref 136–144)
SP GR UR REFRACT.AUTO: 1.01
UROBILINOGEN UR STRIP-ACNC: 0.2 EU/DL
WBC # BLD AUTO: 16.08 K/UL (ref 3.8–10.5)

## 2023-05-16 PROCEDURE — 63700000 HC SELF-ADMINISTRABLE DRUG: Performed by: SPEECH-LANGUAGE PATHOLOGIST

## 2023-05-16 PROCEDURE — 63600000 HC DRUGS/DETAIL CODE: Performed by: HOSPITALIST

## 2023-05-16 PROCEDURE — 63700000 HC SELF-ADMINISTRABLE DRUG: Performed by: HOSPITALIST

## 2023-05-16 PROCEDURE — 99233 SBSQ HOSP IP/OBS HIGH 50: CPT | Mod: FS | Performed by: HOSPITALIST

## 2023-05-16 PROCEDURE — 25800000 HC PHARMACY IV SOLUTIONS: Performed by: SPEECH-LANGUAGE PATHOLOGIST

## 2023-05-16 PROCEDURE — 63600000 HC DRUGS/DETAIL CODE: Performed by: SPEECH-LANGUAGE PATHOLOGIST

## 2023-05-16 PROCEDURE — 63700000 HC SELF-ADMINISTRABLE DRUG: Performed by: INTERNAL MEDICINE

## 2023-05-16 PROCEDURE — 81003 URINALYSIS AUTO W/O SCOPE: CPT | Performed by: PHYSICIAN ASSISTANT

## 2023-05-16 PROCEDURE — 36415 COLL VENOUS BLD VENIPUNCTURE: CPT | Performed by: HOSPITALIST

## 2023-05-16 PROCEDURE — 80048 BASIC METABOLIC PNL TOTAL CA: CPT | Performed by: HOSPITALIST

## 2023-05-16 PROCEDURE — 99222 1ST HOSP IP/OBS MODERATE 55: CPT | Performed by: INTERNAL MEDICINE

## 2023-05-16 PROCEDURE — 83735 ASSAY OF MAGNESIUM: CPT | Performed by: HOSPITALIST

## 2023-05-16 PROCEDURE — 85027 COMPLETE CBC AUTOMATED: CPT | Performed by: HOSPITALIST

## 2023-05-16 PROCEDURE — 21400000 HC ROOM AND CARE CCU/INTERMEDIATE

## 2023-05-16 RX ORDER — POTASSIUM CHLORIDE 750 MG/1
20 TABLET, EXTENDED RELEASE ORAL ONCE
Status: COMPLETED | OUTPATIENT
Start: 2023-05-16 | End: 2023-05-16

## 2023-05-16 RX ORDER — GUAIFENESIN 600 MG/1
600 TABLET, EXTENDED RELEASE ORAL 2 TIMES DAILY
Status: DISCONTINUED | OUTPATIENT
Start: 2023-05-16 | End: 2023-05-17 | Stop reason: HOSPADM

## 2023-05-16 RX ORDER — IBUPROFEN 400 MG/1
400 TABLET ORAL EVERY 6 HOURS PRN
Status: DISCONTINUED | OUTPATIENT
Start: 2023-05-16 | End: 2023-05-17 | Stop reason: HOSPADM

## 2023-05-16 RX ORDER — ACETAMINOPHEN 325 MG/1
650 TABLET ORAL EVERY 4 HOURS PRN
Status: DISCONTINUED | OUTPATIENT
Start: 2023-05-16 | End: 2023-05-17 | Stop reason: HOSPADM

## 2023-05-16 RX ORDER — ACETAMINOPHEN 325 MG/1
650 TABLET ORAL ONCE
Status: COMPLETED | OUTPATIENT
Start: 2023-05-16 | End: 2023-05-16

## 2023-05-16 RX ORDER — IBUPROFEN 400 MG/1
400 TABLET ORAL ONCE
Status: COMPLETED | OUTPATIENT
Start: 2023-05-16 | End: 2023-05-16

## 2023-05-16 RX ADMIN — ACETAMINOPHEN 650 MG: 325 TABLET ORAL at 08:32

## 2023-05-16 RX ADMIN — POTASSIUM CHLORIDE 20 MEQ: 750 TABLET, EXTENDED RELEASE ORAL at 08:32

## 2023-05-16 RX ADMIN — Medication 5 MG: at 23:13

## 2023-05-16 RX ADMIN — ACETAMINOPHEN 650 MG: 325 TABLET ORAL at 21:15

## 2023-05-16 RX ADMIN — HEPARIN SODIUM 5000 UNITS: 5000 INJECTION, SOLUTION INTRAVENOUS; SUBCUTANEOUS at 06:02

## 2023-05-16 RX ADMIN — HEPARIN SODIUM 5000 UNITS: 5000 INJECTION, SOLUTION INTRAVENOUS; SUBCUTANEOUS at 14:14

## 2023-05-16 RX ADMIN — CEFAZOLIN 2 G: 2 INJECTION, POWDER, FOR SOLUTION INTRAMUSCULAR; INTRAVENOUS at 16:58

## 2023-05-16 RX ADMIN — CEFAZOLIN 2 G: 2 INJECTION, POWDER, FOR SOLUTION INTRAMUSCULAR; INTRAVENOUS at 08:33

## 2023-05-16 RX ADMIN — ACETAMINOPHEN 650 MG: 325 TABLET ORAL at 00:34

## 2023-05-16 RX ADMIN — GUAIFENESIN 600 MG: 600 TABLET ORAL at 21:26

## 2023-05-16 RX ADMIN — HEPARIN SODIUM 5000 UNITS: 5000 INJECTION, SOLUTION INTRAVENOUS; SUBCUTANEOUS at 21:25

## 2023-05-16 RX ADMIN — GUAIFENESIN 600 MG: 600 TABLET ORAL at 13:10

## 2023-05-16 RX ADMIN — LEVOTHYROXINE SODIUM 50 MCG: 0.05 TABLET ORAL at 06:02

## 2023-05-16 RX ADMIN — IBUPROFEN 400 MG: 400 TABLET ORAL at 23:13

## 2023-05-16 RX ADMIN — IBUPROFEN 400 MG: 400 TABLET, FILM COATED ORAL at 13:10

## 2023-05-16 RX ADMIN — MAGNESIUM SULFATE HEPTAHYDRATE 2 G: 40 INJECTION, SOLUTION INTRAVENOUS at 09:56

## 2023-05-16 RX ADMIN — ROSUVASTATIN CALCIUM 40 MG: 40 TABLET, COATED ORAL at 21:26

## 2023-05-17 VITALS
SYSTOLIC BLOOD PRESSURE: 118 MMHG | HEIGHT: 60 IN | TEMPERATURE: 98.1 F | WEIGHT: 126 LBS | RESPIRATION RATE: 18 BRPM | DIASTOLIC BLOOD PRESSURE: 59 MMHG | HEART RATE: 77 BPM | BODY MASS INDEX: 24.74 KG/M2 | OXYGEN SATURATION: 94 %

## 2023-05-17 PROBLEM — J18.9 PNEUMONIA OF LEFT LOWER LOBE DUE TO INFECTIOUS ORGANISM: Status: RESOLVED | Noted: 2023-05-15 | Resolved: 2023-05-17

## 2023-05-17 LAB
ALBUMIN SERPL-MCNC: 2.8 G/DL (ref 3.4–5)
ALP SERPL-CCNC: 83 IU/L (ref 35–126)
ALT SERPL-CCNC: 12 IU/L (ref 11–54)
ANION GAP SERPL CALC-SCNC: 7 MEQ/L (ref 3–15)
AST SERPL-CCNC: 19 IU/L (ref 15–41)
BASOPHILS # BLD: 0.06 K/UL (ref 0.01–0.1)
BASOPHILS NFR BLD: 0.4 %
BILIRUB SERPL-MCNC: 0.4 MG/DL (ref 0.3–1.2)
BUN SERPL-MCNC: 9 MG/DL (ref 8–20)
CALCIUM SERPL-MCNC: 8.5 MG/DL (ref 8.9–10.3)
CHLORIDE SERPL-SCNC: 105 MEQ/L (ref 98–109)
CO2 SERPL-SCNC: 22 MEQ/L (ref 22–32)
CREAT SERPL-MCNC: 0.5 MG/DL (ref 0.6–1.1)
DIFFERENTIAL METHOD BLD: ABNORMAL
EOSINOPHIL # BLD: 0.05 K/UL (ref 0.04–0.36)
EOSINOPHIL NFR BLD: 0.3 %
ERYTHROCYTE [DISTWIDTH] IN BLOOD BY AUTOMATED COUNT: 17.8 % (ref 11.7–14.4)
GFR SERPL CREATININE-BSD FRML MDRD: >60 ML/MIN/1.73M*2
GLUCOSE SERPL-MCNC: 143 MG/DL (ref 70–99)
HCT VFR BLDCO AUTO: 35.8 % (ref 35–45)
HGB BLD-MCNC: 12 G/DL (ref 11.8–15.7)
IMM GRANULOCYTES # BLD AUTO: 0.26 K/UL (ref 0–0.08)
IMM GRANULOCYTES NFR BLD AUTO: 1.7 %
LYMPHOCYTES # BLD: 0.09 K/UL (ref 1.2–3.5)
LYMPHOCYTES NFR BLD: 0.6 %
MAGNESIUM SERPL-MCNC: 1.5 MG/DL (ref 1.8–2.5)
MCH RBC QN AUTO: 31 PG (ref 28–33.2)
MCHC RBC AUTO-ENTMCNC: 33.5 G/DL (ref 32.2–35.5)
MCV RBC AUTO: 92.5 FL (ref 83–98)
MONOCYTES # BLD: 0.5 K/UL (ref 0.28–0.8)
MONOCYTES NFR BLD: 3.2 %
NEUTROPHILS # BLD: 14.59 K/UL (ref 1.7–7)
NEUTS SEG NFR BLD: 93.8 %
NRBC BLD-RTO: 0 %
PDW BLD AUTO: 10.9 FL (ref 9.4–12.3)
PLATELET # BLD AUTO: 140 K/UL (ref 150–369)
POTASSIUM SERPL-SCNC: 3.4 MEQ/L (ref 3.6–5.1)
PROT SERPL-MCNC: 5 G/DL (ref 6–8.2)
RBC # BLD AUTO: 3.87 M/UL (ref 3.93–5.22)
SODIUM SERPL-SCNC: 134 MEQ/L (ref 136–144)
WBC # BLD AUTO: 15.55 K/UL (ref 3.8–10.5)

## 2023-05-17 PROCEDURE — 63700000 HC SELF-ADMINISTRABLE DRUG: Performed by: INTERNAL MEDICINE

## 2023-05-17 PROCEDURE — 63600000 HC DRUGS/DETAIL CODE: Performed by: HOSPITALIST

## 2023-05-17 PROCEDURE — 63700000 HC SELF-ADMINISTRABLE DRUG: Performed by: HOSPITALIST

## 2023-05-17 PROCEDURE — 83735 ASSAY OF MAGNESIUM: CPT | Performed by: SPEECH-LANGUAGE PATHOLOGIST

## 2023-05-17 PROCEDURE — 85025 COMPLETE CBC W/AUTO DIFF WBC: CPT | Performed by: SPEECH-LANGUAGE PATHOLOGIST

## 2023-05-17 PROCEDURE — 36415 COLL VENOUS BLD VENIPUNCTURE: CPT | Performed by: SPEECH-LANGUAGE PATHOLOGIST

## 2023-05-17 PROCEDURE — 80053 COMPREHEN METABOLIC PANEL: CPT | Performed by: SPEECH-LANGUAGE PATHOLOGIST

## 2023-05-17 PROCEDURE — 63700000 HC SELF-ADMINISTRABLE DRUG: Performed by: SPEECH-LANGUAGE PATHOLOGIST

## 2023-05-17 PROCEDURE — 99239 HOSP IP/OBS DSCHRG MGMT >30: CPT | Performed by: HOSPITALIST

## 2023-05-17 PROCEDURE — 63600000 HC DRUGS/DETAIL CODE: Performed by: SPEECH-LANGUAGE PATHOLOGIST

## 2023-05-17 PROCEDURE — 25800000 HC PHARMACY IV SOLUTIONS: Performed by: SPEECH-LANGUAGE PATHOLOGIST

## 2023-05-17 RX ORDER — POTASSIUM CHLORIDE 14.9 MG/ML
20 INJECTION INTRAVENOUS AS NEEDED
Status: DISCONTINUED | OUTPATIENT
Start: 2023-05-17 | End: 2023-05-17 | Stop reason: HOSPADM

## 2023-05-17 RX ORDER — POTASSIUM CHLORIDE 750 MG/1
20 TABLET, EXTENDED RELEASE ORAL AS NEEDED
Status: DISCONTINUED | OUTPATIENT
Start: 2023-05-17 | End: 2023-05-17 | Stop reason: HOSPADM

## 2023-05-17 RX ORDER — POTASSIUM CHLORIDE 750 MG/1
40 TABLET, EXTENDED RELEASE ORAL AS NEEDED
Status: DISCONTINUED | OUTPATIENT
Start: 2023-05-17 | End: 2023-05-17 | Stop reason: HOSPADM

## 2023-05-17 RX ORDER — CEPHALEXIN 500 MG/1
500 CAPSULE ORAL 4 TIMES DAILY
Qty: 28 CAPSULE | Refills: 0 | Status: SHIPPED | OUTPATIENT
Start: 2023-05-17 | End: 2023-05-24 | Stop reason: ALTCHOICE

## 2023-05-17 RX ORDER — ALLOPURINOL 100 MG/1
100 TABLET ORAL DAILY
Status: DISCONTINUED | OUTPATIENT
Start: 2023-05-17 | End: 2023-05-17 | Stop reason: HOSPADM

## 2023-05-17 RX ORDER — TRIAMTERENE/HYDROCHLOROTHIAZID 37.5-25 MG
0.5 TABLET ORAL EVERY MORNING
Status: DISCONTINUED | OUTPATIENT
Start: 2023-05-17 | End: 2023-05-17 | Stop reason: HOSPADM

## 2023-05-17 RX ADMIN — HEPARIN SODIUM 5000 UNITS: 5000 INJECTION, SOLUTION INTRAVENOUS; SUBCUTANEOUS at 14:23

## 2023-05-17 RX ADMIN — CEFAZOLIN 2 G: 2 INJECTION, POWDER, FOR SOLUTION INTRAMUSCULAR; INTRAVENOUS at 01:56

## 2023-05-17 RX ADMIN — GUAIFENESIN 600 MG: 600 TABLET ORAL at 08:54

## 2023-05-17 RX ADMIN — ALLOPURINOL 100 MG: 100 TABLET ORAL at 13:08

## 2023-05-17 RX ADMIN — TRIAMTERENE AND HYDROCHLOROTHIAZIDE 0.5 TABLET: 37.5; 25 TABLET ORAL at 13:08

## 2023-05-17 RX ADMIN — IBUPROFEN 400 MG: 400 TABLET ORAL at 06:36

## 2023-05-17 RX ADMIN — IBUPROFEN 400 MG: 400 TABLET ORAL at 14:30

## 2023-05-17 RX ADMIN — ACETAMINOPHEN 650 MG: 325 TABLET ORAL at 14:30

## 2023-05-17 RX ADMIN — LEVOTHYROXINE SODIUM 50 MCG: 0.05 TABLET ORAL at 06:36

## 2023-05-17 RX ADMIN — HEPARIN SODIUM 5000 UNITS: 5000 INJECTION, SOLUTION INTRAVENOUS; SUBCUTANEOUS at 06:36

## 2023-05-17 RX ADMIN — ACETAMINOPHEN 650 MG: 325 TABLET ORAL at 08:57

## 2023-05-17 RX ADMIN — CEFAZOLIN 2 G: 2 INJECTION, POWDER, FOR SOLUTION INTRAMUSCULAR; INTRAVENOUS at 08:55

## 2023-05-18 ENCOUNTER — PATIENT OUTREACH (OUTPATIENT)
Dept: CASE MANAGEMENT | Facility: CLINIC | Age: 73
End: 2023-05-18
Payer: MEDICARE

## 2023-05-18 RX ORDER — ACETAMINOPHEN 325 MG/1
650 TABLET ORAL EVERY 6 HOURS PRN
COMMUNITY
End: 2024-11-12

## 2023-05-18 RX ORDER — IBUPROFEN 600 MG/1
600 TABLET ORAL EVERY 6 HOURS PRN
COMMUNITY
End: 2024-08-05

## 2023-05-18 ASSESSMENT — ENCOUNTER SYMPTOMS
RESPIRATORY NEGATIVE: 1
NEUROLOGICAL NEGATIVE: 1
GASTROINTESTINAL NEGATIVE: 1
MUSCULOSKELETAL NEGATIVE: 1
PSYCHIATRIC NEGATIVE: 1
ENDOCRINE NEGATIVE: 1
ACTIVITY CHANGE: 1
HEMATOLOGIC/LYMPHATIC NEGATIVE: 1
EYES NEGATIVE: 1
CARDIOVASCULAR NEGATIVE: 1

## 2023-05-18 NOTE — PROGRESS NOTES
NAME: Muriel Franklin    MRN: [id number]    YOB: 1950    Event Review:    Initial TCM Patient Outreach Date: 05/18/23    Assessment completed with: Patient  Patient stated reason for hospitalization: Fever, BUE swelling  Discharge Diagnosis: Fever    Patient readmitted in the last 30 days: No  Discharging Facility: [facility]  Date of Last Admission: 05/15/23  Date of Last Discharge: 05/17/23    Patient's perception of their health status since discharge: Improving    HPI: Patient presented to [facility] d/t fever and BUE swelling.    CXR showing patchy opacity at left lung base, concern for pneumonia or atelectasis. US venous BUE showing thrombus in right basilic vein and distal left cephalic vein. Patient treated with oxygen, IVF and IV antibiotics. Blood cultures NGTD. Patient deemed stable for discharge home with RX for PO antibiotics.    Spoke to patient who states that she is feeling okay. She does c/o constant pain in the RUE, with notable swelling. LUE is mildly swollen, no discomfort. She is ambulatory independent but notes decreased mobility in RUE. She is taking Tylenol/Motrin PRN. She is tolerating diet with decreased appetite reported. She is taking PO antibiotics as ordered. She has outreached to oncology, Dr. Hendrickson and is awaiting a call back. She is scheduled to have a port placed next week, 5/26/23.    Review of Systems   Constitutional: Positive for activity change.   HENT: Negative.    Eyes: Negative.    Respiratory: Negative.    Cardiovascular: Negative.    Gastrointestinal: Negative.    Endocrine: Negative.    Genitourinary: Negative.    Musculoskeletal: Negative.    Skin:        BUE swelling   Allergic/Immunologic: Positive for immunocompromised state.   Neurological: Negative.    Hematological: Negative.    Psychiatric/Behavioral: Negative.      Medication Review:    Medication Review: Yes    Reported by: Patient  Any new medications prescribed at discharge?:  Yes    New prescriptions filled?: Yes    Nursing Interventions:  (Patient following up wiht Dr. Hendrickson)  Reconciled the current and discharge medications: No  Reviewed AVS (Discharge Instructions)?: Yes    Acute Pain:    Acute pain: Yes  Limitation of routine activities due to acute pain?: yes    Acute pain timing: constant  Location of acute pain: RUE    Chronic Pain:    Chronic pain: No    Diet/Nutrition:    Type of Diet: Regular  Diet Adherence: Adherent with diet    Home Care Services:    Home Care Interventions: No intervention required     Post-Discharge Durable Medical Equipment::    Durable Medical Equipment: None    Home Management:    Living Arrangement: Spouse  Support System:: Spouse  Type of Residence: [address]  Home Monitoring: None  Any patient reported falls in the last 3 months?: No  Yazidism or spiritual beliefs that impact treatment?: No    Appointment Scheduling:    PCP appointment scheduled: No    Patient Scheduling Dispositions: Pt will call office to schedule (Patient would like to wait until after speaking with oncology)     Follow-Ups:     Relevant Labs & Tests: 5/26/23 Port placement  Relevant Specialist Follow-ups: Dr. Hendrickson, oncology in 1-2 weeks    Interventions/ Care Coordination:    Interventions/ Care Coordination: Provided resources to facilitate adherence  General Education: Falls/Home safety    Educated patient on access to care.  RN available for future care management.    Nuria Phillip, RN  [phone]

## 2023-05-18 NOTE — Clinical Note
Pt declines appt at this time, prefers to speak to oncology 1st. I will call her again in 1 week and again offer [facility] appt.

## 2023-05-20 LAB
BACTERIA BLD CULT: NORMAL
BACTERIA BLD CULT: NORMAL

## 2023-05-23 ENCOUNTER — APPOINTMENT (EMERGENCY)
Dept: RADIOLOGY | Facility: HOSPITAL | Age: 73
End: 2023-05-23
Payer: MEDICARE

## 2023-05-23 ENCOUNTER — HOSPITAL ENCOUNTER (EMERGENCY)
Facility: HOSPITAL | Age: 73
Discharge: HOME | End: 2023-05-23
Attending: EMERGENCY MEDICINE
Payer: MEDICARE

## 2023-05-23 VITALS
HEIGHT: 61 IN | DIASTOLIC BLOOD PRESSURE: 85 MMHG | WEIGHT: 127 LBS | SYSTOLIC BLOOD PRESSURE: 137 MMHG | HEART RATE: 88 BPM | RESPIRATION RATE: 18 BRPM | TEMPERATURE: 97.8 F | BODY MASS INDEX: 23.98 KG/M2 | OXYGEN SATURATION: 99 %

## 2023-05-23 DIAGNOSIS — L03.113 CELLULITIS OF RIGHT UPPER EXTREMITY: Primary | ICD-10-CM

## 2023-05-23 LAB
ALBUMIN SERPL-MCNC: 4 G/DL (ref 3.4–5)
ALP SERPL-CCNC: 87 IU/L (ref 35–126)
ALT SERPL-CCNC: 23 IU/L (ref 11–54)
ANION GAP SERPL CALC-SCNC: 8 MEQ/L (ref 3–15)
AST SERPL-CCNC: 29 IU/L (ref 15–41)
BASOPHILS # BLD: 0.14 K/UL (ref 0.01–0.1)
BASOPHILS NFR BLD: 1.5 %
BILIRUB SERPL-MCNC: 0.5 MG/DL (ref 0.3–1.2)
BUN SERPL-MCNC: 17 MG/DL (ref 8–20)
CALCIUM SERPL-MCNC: 9.6 MG/DL (ref 8.9–10.3)
CHLORIDE SERPL-SCNC: 95 MEQ/L (ref 98–109)
CO2 SERPL-SCNC: 26 MEQ/L (ref 22–32)
CREAT SERPL-MCNC: 0.7 MG/DL (ref 0.6–1.1)
DIFFERENTIAL METHOD BLD: ABNORMAL
EOSINOPHIL # BLD: 0.12 K/UL (ref 0.04–0.36)
EOSINOPHIL NFR BLD: 1.3 %
ERYTHROCYTE [DISTWIDTH] IN BLOOD BY AUTOMATED COUNT: 16.7 % (ref 11.7–14.4)
GFR SERPL CREATININE-BSD FRML MDRD: >60 ML/MIN/1.73M*2
GLUCOSE SERPL-MCNC: 93 MG/DL (ref 70–99)
HCT VFR BLDCO AUTO: 38.9 % (ref 35–45)
HGB BLD-MCNC: 13.1 G/DL (ref 11.8–15.7)
IMM GRANULOCYTES # BLD AUTO: 0.46 K/UL (ref 0–0.08)
IMM GRANULOCYTES NFR BLD AUTO: 4.9 %
LYMPHOCYTES # BLD: 0.38 K/UL (ref 1.2–3.5)
LYMPHOCYTES NFR BLD: 4 %
MCH RBC QN AUTO: 31 PG (ref 28–33.2)
MCHC RBC AUTO-ENTMCNC: 33.7 G/DL (ref 32.2–35.5)
MCV RBC AUTO: 92.2 FL (ref 83–98)
MONOCYTES # BLD: 0.88 K/UL (ref 0.28–0.8)
MONOCYTES NFR BLD: 9.3 %
NEUTROPHILS # BLD: 7.46 K/UL (ref 1.7–7)
NEUTS SEG NFR BLD: 79 %
NRBC BLD-RTO: 0 %
PDW BLD AUTO: 9.7 FL (ref 9.4–12.3)
PLATELET # BLD AUTO: 199 K/UL (ref 150–369)
POTASSIUM SERPL-SCNC: 4.4 MEQ/L (ref 3.6–5.1)
PROT SERPL-MCNC: 7.3 G/DL (ref 6–8.2)
RBC # BLD AUTO: 4.22 M/UL (ref 3.93–5.22)
SODIUM SERPL-SCNC: 129 MEQ/L (ref 136–144)
WBC # BLD AUTO: 9.44 K/UL (ref 3.8–10.5)

## 2023-05-23 PROCEDURE — 80053 COMPREHEN METABOLIC PANEL: CPT

## 2023-05-23 PROCEDURE — 93971 EXTREMITY STUDY: CPT | Mod: RT

## 2023-05-23 PROCEDURE — 85025 COMPLETE CBC W/AUTO DIFF WBC: CPT

## 2023-05-23 PROCEDURE — 63700000 HC SELF-ADMINISTRABLE DRUG: Performed by: EMERGENCY MEDICINE

## 2023-05-23 PROCEDURE — 36415 COLL VENOUS BLD VENIPUNCTURE: CPT

## 2023-05-23 PROCEDURE — 80053 COMPREHEN METABOLIC PANEL: CPT | Performed by: EMERGENCY MEDICINE

## 2023-05-23 PROCEDURE — 99284 EMERGENCY DEPT VISIT MOD MDM: CPT | Mod: 25

## 2023-05-23 PROCEDURE — 99283 EMERGENCY DEPT VISIT LOW MDM: CPT

## 2023-05-23 PROCEDURE — 85025 COMPLETE CBC W/AUTO DIFF WBC: CPT | Performed by: EMERGENCY MEDICINE

## 2023-05-23 RX ORDER — DOXYCYCLINE 100 MG/1
100 CAPSULE ORAL 2 TIMES DAILY
Qty: 14 CAPSULE | Refills: 0 | Status: SHIPPED | OUTPATIENT
Start: 2023-05-23 | End: 2023-05-30

## 2023-05-23 RX ORDER — DOXYCYCLINE HYCLATE 100 MG
100 TABLET ORAL ONCE
Status: COMPLETED | OUTPATIENT
Start: 2023-05-23 | End: 2023-05-23

## 2023-05-23 RX ADMIN — DOXYCYCLINE HYCLATE 100 MG: 100 TABLET, COATED ORAL at 21:41

## 2023-05-23 ASSESSMENT — ENCOUNTER SYMPTOMS
CHILLS: 0
FEVER: 0

## 2023-05-23 NOTE — ED PROVIDER NOTES
Emergency Medicine Note  HPI   HISTORY OF PRESENT ILLNESS     73-year-old female with non-Hodgkin's lymphoma, hypertension, COVID-19, hyperlipidemia, GERD, superficial DVT to bilateral upper extremities, cellulitis, presents the emergency department for erythema to right upper extremity.  Patient was admitted here for 2 days from 5/15-5/17.  Inpatient summary reviewed.  Patient presented with fever, redness and swelling to bilateral upper extremities.  Patient had an ultrasound done of the upper extremities which noted thrombus within the right basilic vein and the mid to distal left cephalic vein.  Hematology was consulted, did not feel systemic anticoagulation was needed at this time.  There was thought that her fever was likely secondary to superficial thrombophlebitis and mild cellulitis.  She was discharged on a 10-day course of Keflex.  Patient reports her last day of the antibiotic is tomorrow.  States that she still has some pain and erythema to the right forearm.  The area is not as large, or swollen as it was when she was admitted a week ago.  Patient was concerned because it has not fully resolved, called her oncologist at [place], to make an appointment in regards to this.  She did send pictures and was advised to come to the emergency department.  Patient states her oncologist was using words like a MRSA, sepsis.  Patient has not had a fever.  Patient gets chemo every 28 days, her next dose of chemo is May 31/Guillermina 1.  She notes that the area that is inflamed, is where she usually gets injections.  She has been advised that the chemo she is on can cause venous irritation, and therefore she is getting a port placed next week.  Since discharge here for the superficial thrombophlebitis of mild cellulitis, she has not been seen in person by her oncologist.            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       Past Surgical History:   Procedure Laterality Date   • EYE SURGERY     • FOOT SURGERY     • HIP SURGERY     • KNEE SURGERY         Family History   Problem Relation Age of Onset   • Arthritis Biological Mother    • Heart attack Biological Father    • Heart disease Biological Brother        Social History     Tobacco Use   • Smoking status: Never   • Smokeless tobacco: Never   Vaping Use   • Vaping status: Never Used   Substance Use Topics   • Alcohol use: Yes     Alcohol/week: 7.0 standard drinks of alcohol     Types: 7 Glasses of wine per week   • Drug use: No         Review of Systems   REVIEW OF SYSTEMS     Review of Systems   Constitutional: Negative for chills and fever.   Skin:        Area of erythema to the right forearm         VITALS     ED Vitals    Date/Time Temp Pulse Resp BP SpO2 [facility]   05/23/23 1747 36.6 °C (97.8 °F) 88 18 137/85 99 % MCJ        Pulse Ox %: 99 % (05/23/23 2125)  Pulse Ox Interpretation: Normal (05/23/23 2125)           Physical Exam   PHYSICAL EXAM     Physical Exam  Vitals reviewed.   Constitutional:       General: She is not in acute distress.     Appearance: Normal appearance. She is not ill-appearing, toxic-appearing or diaphoretic.   HENT:      Head: Normocephalic and atraumatic.   Cardiovascular:      Rate and Rhythm: Normal rate and regular rhythm.   Pulmonary:      Effort: Pulmonary effort is normal. No respiratory distress.   Musculoskeletal:         General: Normal range of motion.        Arms:       Comments: 2 areas of erythema, 1 to the right forearm, and then a small 1 to the right medial condyle area.  Areas are indurated, without fluctuance.  No lymphangitis.  Mildly tender to palpation  Neurovascularly intact  Sensation  intact  Cap refill less than 2 seconds  Full active range of motion of the right upper extremity   Skin:     General: Skin is warm and dry.   Neurological:      Mental Status: She is alert and oriented to person, place, and time.   Psychiatric:         Mood and Affect: Mood normal.         Behavior: Behavior normal.           PROCEDURES     Procedures     DATA     Results     Procedure Component Value Units Date/Time    [facility] Draw Panel [[id number]] Collected: 05/23/23 1756    Specimen: Blood, Venous Updated: 05/24/23 0201    Narrative:      The following orders were created for panel order [facility] Draw Panel.  Procedure                               Abnormality         Status                     ---------                               -----------         ------                     RAINBOW RED[[id number]]                                      Final result               RAINBOW LT BLUE[[id number]]                                  Final result               RAINBOW GOLD[[id number]]                                     Final result               [facility] Blood Culture[[id number]]                            Final result                 Please view results for these tests on the individual orders.    RAINBOW RED [[id number]] Collected: 05/23/23 1756    Specimen: Blood, Venous Updated: 05/24/23 0201    RAINBOW LT BLUE [[id number]] Collected: 05/23/23 1756    Specimen: Blood, Venous Updated: 05/24/23 0201    RAINBOW GOLD [[id number]] Collected: 05/23/23 1756    Specimen: Blood, Venous Updated: 05/24/23 0201    [facility] Blood Culture [[id number]] Collected: 05/23/23 1756    Specimen: Blood, Venous Updated: 05/24/23 0201    Comprehensive metabolic panel [[id number]]  (Abnormal) Collected: 05/23/23 1756    Specimen: Blood, Venous Updated: 05/23/23 1826     Sodium 129 mEQ/L      Potassium 4.4 mEQ/L      Comment: Results obtained on plasma. Plasma Potassium values may be up to 0.4 mEQ/L less than serum values. The differences may be  greater for patients with high platelet or white cell counts.        Chloride 95 mEQ/L      CO2 26 mEQ/L      BUN 17 mg/dL      Creatinine 0.7 mg/dL      Glucose 93 mg/dL      Calcium 9.6 mg/dL      AST (SGOT) 29 IU/L      ALT (SGPT) 23 IU/L      Alkaline Phosphatase 87 IU/L      Total Protein 7.3 g/dL      Comment: Test performed on plasma which typically contains approximately 0.4 g/dL more protein than serum.        Albumin 4.0 g/dL      Bilirubin, Total 0.5 mg/dL      eGFR >60.0 mL/min/1.73m*2      Anion Gap 8 mEQ/L     CBC and differential [[id number]]  (Abnormal) Collected: 05/23/23 1756    Specimen: Blood, Venous Updated: 05/23/23 1810     WBC 9.44 K/uL      RBC 4.22 M/uL      Hemoglobin 13.1 g/dL      Hematocrit 38.9 %      MCV 92.2 fL      MCH 31.0 pg      MCHC 33.7 g/dL      RDW 16.7 %      Platelets 199 K/uL      MPV 9.7 fL      Differential Type Auto     nRBC 0.0 %      Immature Granulocytes 4.9 %      Neutrophils 79.0 %      Lymphocytes 4.0 %      Monocytes 9.3 %      Eosinophils 1.3 %      Basophils 1.5 %      Immature Granulocytes, Absolute 0.46 K/uL      Neutrophils, Absolute 7.46 K/uL      Lymphocytes, Absolute 0.38 K/uL      Monocytes, Absolute 0.88 K/uL      Eosinophils, Absolute 0.12 K/uL      Basophils, Absolute 0.14 K/uL           Imaging Results          US venous arm, RU extremity (Final result)  Result time 05/23/23 20:56:51    Final result                 Impression:    IMPRESSION:  Similar appearance of occlusive thrombus within the mid and distal portions of  the right basilic vein.  No additional venous thrombosis within the remainder of  the right upper extremity.    COMMENT:    Comparison: Bilateral upper extremity venous Doppler ultrasound 5/15/2023    Technique: Grayscale ultrasound, spectral Doppler, and color Doppler was  performed of the right upper extremity veins.    Findings:  Again noted is intraluminal echogenic material distending the mid and distal  portions of the right  basilic vein with a lack of compressibility, intraluminal  color Doppler flow and spectral Doppler waveforms, similar to prior.  There is  soft tissue edema noted within the right forearm.  Grayscale and color Doppler  reveals no evidence of thrombus within the visualized right internal jugular,  subclavian, axillary, brachial, and cephalic veins. There is normal  compressibility of the right internal jugular, axillary, brachial, and cephalic  veins. There are normal spectral Doppler waveforms within these vessels.             Narrative:      CLINICAL HISTORY: Right upper extremity edema and erythema.                                No orders to display       Scoring tools                                  ED Course & MDM   MDM / ED COURSE / CLINICAL IMPRESSION / DISPO     MDM    ED Course as of 05/24/23 0643   Tue May 23, 2023   1923 Impression: Erythema to the right forearm  Plan: Labs, ultrasound to reevaluate the superficial DVT, observe [MG]   2041 Sodium(!): 129  Low [MG]   2042 Labs unremarkable [MG]   2121 US venous arm, RU extremity  IMPRESSION:  Similar appearance of occlusive thrombus within the mid and distal portions of  the right basilic vein.  No additional venous thrombosis within the remainder of  the right upper extremity. [MG]   2138 Case discussed with Dr. Quintanilla, who also evaluated the patient, went over results and discharged her. [MG]      ED Course User Index  [MG] Ángela Tabor PA C     Clinical Impression      Cellulitis of right upper extremity     _________________     ED Disposition   Discharge                   Ángela Tabor PA C  05/24/23 0643

## 2023-05-24 ENCOUNTER — TELEPHONE (OUTPATIENT)
Dept: RADIOLOGY | Facility: HOSPITAL | Age: 73
End: 2023-05-24
Payer: MEDICARE

## 2023-05-24 NOTE — DISCHARGE INSTRUCTIONS
Change your antibiotics to Doxycycline for now. You were given a first dose here in the ER. Follow up closely with your doctor.

## 2023-05-24 NOTE — ED ATTESTATION NOTE
Procedures      5/23/2023  9:25 PM  I have personally seen and examined the patient.  I personally performed the key components of the encounter and provided medical decision making for this patient. I reviewed and agree with the PA/NP/Resident's assessment and plan of care, with any exceptions as documented below.    My focused history, examination, assessment, and plan of care of Muriel Franklin is as follows:    HPI: The patient is a 73 y.o. who comes to the ED for right arm redness, swelling. Recently dx with superficial thrombophlebitis right basilic. Was given keflex - pt feels swelling may have improved but pain and redness unchanged. No fever. No CP, BJ.     I was provided additional history from: spouse.    Pertinent past medical history includes: NHL, HTN      Exam: Awake, alert, no distress. Afebrile. HR regular. No facial asymmetry. Ambulates easily. Patch of erythema appx 3 cm mid forearm and a 1cm patch of erythema proximal to this. Pt states redness is unchanged.   Vital Signs Review: Vital signs have been reviewed. The oxygen saturation is SpO2: 99 %  which is normal.          Impression/Plan/Medical decision making/ED course: DVU shows unchanged superficial basilic vein thrombophlebitis. Possibly continued cellulitis. WBC normal. Afebrile. Discussed change abx to doxy. F/u with her doctor as outpatient.             Disposition: Plan dc              NOTE: Patient seen during a time of significantly increased volumes, decreased capacity and staff. Portion of management and initial evaluation may have been done while in the waiting room because of this. This document was created using dragon dictation software.  There might be some typographical errors due to this technology.         Nkechi Quintanilla MD  05/24/23 0049

## 2023-05-25 ENCOUNTER — PATIENT OUTREACH (OUTPATIENT)
Dept: CASE MANAGEMENT | Facility: CLINIC | Age: 73
End: 2023-05-25
Payer: MEDICARE

## 2023-05-25 NOTE — PROGRESS NOTES
[facility] follow up outreach made to patient. She states that she is feeling good. She went back to ED d/t continued RUE pain/swelling. Repeat US & blood work done. She was d/c home with continuation of antibiotics, switched to doxy. Now swelling is still present but pain is decreasing. She will have her port placed tomorrow. She is aware to call PCP for any concerns/needs.     Care management available for future needs.    Nuria Phillip, RN  Nurse Ambulatory Care Manager  [phone]

## 2023-05-26 ENCOUNTER — HOSPITAL ENCOUNTER (OUTPATIENT)
Dept: RADIOLOGY | Facility: HOSPITAL | Age: 73
Discharge: HOME | End: 2023-05-26
Attending: INTERNAL MEDICINE | Admitting: RADIOLOGY
Payer: MEDICARE

## 2023-05-26 VITALS
WEIGHT: 125 LBS | OXYGEN SATURATION: 94 % | DIASTOLIC BLOOD PRESSURE: 79 MMHG | RESPIRATION RATE: 20 BRPM | HEIGHT: 61 IN | SYSTOLIC BLOOD PRESSURE: 139 MMHG | HEART RATE: 66 BPM | BODY MASS INDEX: 23.6 KG/M2

## 2023-05-26 DIAGNOSIS — C82.98 FOLLICULAR LYMPHOMA OF LYMPH NODES OF MULTIPLE REGIONS, UNSPECIFIED FOLLICULAR LYMPHOMA TYPE (CMS/HCC): ICD-10-CM

## 2023-05-26 LAB
BASOPHILS # BLD: 0.09 K/UL (ref 0.01–0.1)
BASOPHILS NFR BLD: 0.9 %
DIFFERENTIAL METHOD BLD: ABNORMAL
EOSINOPHIL # BLD: 0.07 K/UL (ref 0.04–0.36)
EOSINOPHIL NFR BLD: 0.7 %
ERYTHROCYTE [DISTWIDTH] IN BLOOD BY AUTOMATED COUNT: 16.8 % (ref 11.7–14.4)
HCT VFR BLDCO AUTO: 39.9 % (ref 35–45)
HGB BLD-MCNC: 13.2 G/DL (ref 11.8–15.7)
IMM GRANULOCYTES # BLD AUTO: 0.32 K/UL (ref 0–0.08)
IMM GRANULOCYTES NFR BLD AUTO: 3.2 %
INR PPP: 1
LYMPHOCYTES # BLD: 0.48 K/UL (ref 1.2–3.5)
LYMPHOCYTES NFR BLD: 4.8 %
MCH RBC QN AUTO: 30.8 PG (ref 28–33.2)
MCHC RBC AUTO-ENTMCNC: 33.1 G/DL (ref 32.2–35.5)
MCV RBC AUTO: 93 FL (ref 83–98)
MONOCYTES # BLD: 1 K/UL (ref 0.28–0.8)
MONOCYTES NFR BLD: 10 %
NEUTROPHILS # BLD: 8 K/UL (ref 1.7–7)
NEUTS SEG NFR BLD: 80.4 %
NRBC BLD-RTO: 0 %
PDW BLD AUTO: 9.6 FL (ref 9.4–12.3)
PLATELET # BLD AUTO: 185 K/UL (ref 150–369)
PROTHROMBIN TIME: 12.8 SEC (ref 12.2–14.5)
RBC # BLD AUTO: 4.29 M/UL (ref 3.93–5.22)
WBC # BLD AUTO: 9.96 K/UL (ref 3.8–10.5)

## 2023-05-26 PROCEDURE — 76937 US GUIDE VASCULAR ACCESS: CPT

## 2023-05-26 PROCEDURE — B543ZZA ULTRASONOGRAPHY OF RIGHT JUGULAR VEINS, GUIDANCE: ICD-10-PCS | Performed by: RADIOLOGY

## 2023-05-26 PROCEDURE — 63600000 HC DRUGS/DETAIL CODE: Performed by: NURSE PRACTITIONER

## 2023-05-26 PROCEDURE — 25000000 HC PHARMACY GENERAL: Performed by: RADIOLOGY

## 2023-05-26 PROCEDURE — 25800000 HC PHARMACY IV SOLUTIONS: Performed by: NURSE PRACTITIONER

## 2023-05-26 PROCEDURE — 0JH63XZ INSERTION OF TUNNELED VASCULAR ACCESS DEVICE INTO CHEST SUBCUTANEOUS TISSUE AND FASCIA, PERCUTANEOUS APPROACH: ICD-10-PCS | Performed by: RADIOLOGY

## 2023-05-26 PROCEDURE — 77001 FLUOROGUIDE FOR VEIN DEVICE: CPT

## 2023-05-26 PROCEDURE — 85025 COMPLETE CBC W/AUTO DIFF WBC: CPT | Performed by: NURSE PRACTITIONER

## 2023-05-26 PROCEDURE — 36415 COLL VENOUS BLD VENIPUNCTURE: CPT | Performed by: NURSE PRACTITIONER

## 2023-05-26 PROCEDURE — 71000001 HC PACU PHASE 1 INITIAL 30MIN

## 2023-05-26 PROCEDURE — B518ZZA FLUOROSCOPY OF SUPERIOR VENA CAVA, GUIDANCE: ICD-10-PCS | Performed by: RADIOLOGY

## 2023-05-26 PROCEDURE — 63600000 HC DRUGS/DETAIL CODE: Mod: JW | Performed by: RADIOLOGY

## 2023-05-26 PROCEDURE — C1788 PORT, INDWELLING, IMP: HCPCS

## 2023-05-26 PROCEDURE — 02H633Z INSERTION OF INFUSION DEVICE INTO RIGHT ATRIUM, PERCUTANEOUS APPROACH: ICD-10-PCS | Performed by: RADIOLOGY

## 2023-05-26 PROCEDURE — 85610 PROTHROMBIN TIME: CPT | Performed by: NURSE PRACTITIONER

## 2023-05-26 PROCEDURE — 36100345 IR PORT PLACEMENT

## 2023-05-26 RX ORDER — MIDAZOLAM HYDROCHLORIDE 2 MG/2ML
INJECTION, SOLUTION INTRAMUSCULAR; INTRAVENOUS
Status: COMPLETED | OUTPATIENT
Start: 2023-05-26 | End: 2023-05-26

## 2023-05-26 RX ORDER — FENTANYL CITRATE 50 UG/ML
INJECTION, SOLUTION INTRAMUSCULAR; INTRAVENOUS
Status: COMPLETED | OUTPATIENT
Start: 2023-05-26 | End: 2023-05-26

## 2023-05-26 RX ORDER — LIDOCAINE HYDROCHLORIDE 10 MG/ML
INJECTION, SOLUTION INFILTRATION; PERINEURAL
Status: COMPLETED | OUTPATIENT
Start: 2023-05-26 | End: 2023-05-26

## 2023-05-26 RX ORDER — SODIUM CHLORIDE 9 MG/ML
40 INJECTION, SOLUTION INTRAVENOUS CONTINUOUS
Status: DISCONTINUED | OUTPATIENT
Start: 2023-05-26 | End: 2023-05-26 | Stop reason: HOSPADM

## 2023-05-26 RX ORDER — HEPARIN 100 UNIT/ML
SYRINGE INTRAVENOUS
Status: COMPLETED | OUTPATIENT
Start: 2023-05-26 | End: 2023-05-26

## 2023-05-26 RX ADMIN — FENTANYL CITRATE 50 MCG: 50 INJECTION, SOLUTION INTRAMUSCULAR; INTRAVENOUS at 09:05

## 2023-05-26 RX ADMIN — HEPARIN 400 UNITS: 100 SYRINGE at 09:31

## 2023-05-26 RX ADMIN — MIDAZOLAM HYDROCHLORIDE 1 MG: 1 INJECTION, SOLUTION INTRAMUSCULAR; INTRAVENOUS at 09:05

## 2023-05-26 RX ADMIN — SODIUM CHLORIDE 40 ML/HR: 9 INJECTION, SOLUTION INTRAVENOUS at 08:41

## 2023-05-26 RX ADMIN — LIDOCAINE HYDROCHLORIDE 10 ML: 10 INJECTION, SOLUTION INFILTRATION; PERINEURAL at 09:15

## 2023-05-26 RX ADMIN — CEFAZOLIN 2 G: 2 INJECTION, POWDER, FOR SOLUTION INTRAMUSCULAR; INTRAVENOUS at 08:51

## 2023-05-26 NOTE — Clinical Note
Patient placed on procedure table in supine position. Positioning devices: arm board under arms and pillow under knees.

## 2023-05-26 NOTE — DISCHARGE INSTRUCTIONS
Chest Port Placement    DISCHARGE INSTRUCTIONS  You have had a port placed today in your chest.  You were given conscious sedation; therefore, you should not drive, operate heavy machinery, conduct business matters, or drink alcohol until tomorrow.   If the area is sore, you may apply ice to the area in 20 minute intervals; alternating 20 minutes ice with a 20 minute break.  If you have pain, you may take Tylenol 650mg by mouth every 6 hours for the first day. (DO NOT exceed 2000mg in a 24 hour period.)  The site is covered with skin glue. Avoid picking at or removing the skin glue. It will flake off in 7-10 days.  The sutures are internal and disolveable. These do not need to be removed.   You may shower. Keep the incision site clean and dry. Do not bathe, swim, or otherwise submerge the incision site for 3 weeks.   You may resume your normal diet.  You may resume your normal medications.     Notify your oncology physician and/or Interventional Radiologist IMMEDIATELY if any of the following occur:  · Fever of 101° F (38 ° C) or chills  · Pain, redness or swelling around the port.      Physician Contact Information  If you have a problem that you believe requires immediate attention, you should go to the Emergency Room, either at [facility] or the closest hospital. If you believe that your problem can safely wait until you speak to a physician, you should contact your doctor or Interventional Radiologist.   It is usually easier to contact your own physician by calling the office, or after hours through the answering service. If you do not know the number, the hospital  can connect you by calling [phone].   If you have concerns that need to be answered by the Interventional Radiologist, you can reach him/ her as follows:   During regular weekday hours (8AM to 5PM), call [phone] and ask the technologist to connect you with the Interventional Radiologist.   During off hours for emergencies call  [phone] and ask the hospital  to contact the Interventional Radiologist on-call.    If you would like to have your port accessed for lab work at the [facility], you can make an appointment by calling [phone].      [facility] strongly recommends that you visit a Primary Care Provider (PCP) regularly. Your PCP can help you implement the recommendations we gave you today, coordinate care among your specialists, as well as make sure you are up to date with wellness exams, immunizations and preventive screenings. Your PCP can also help when you are feeling sick, potentially avoiding the need for urgent care or emergency department visits. For these reasons, it is important that you follow up with your PCP at least annually or more often based upon your medical conditions. If you do not have a PCP, please call [phone] ([phone]) or go to Find a Doctor for help with finding one.

## 2023-05-26 NOTE — POST-PROCEDURE NOTE
Interventional Radiology Brief Postprocedure Note    Muriel Franklin     Attending: Sven Jiménez MD     Assistant:      Diagnosis: Follicular lymphoma    Description of procedure:  R IJ chest port placement    Contrast:       Anesthesia:  Conscious Sedation    Volume of Lidocaine Utilized (ml): 10     Medications:  Versed 1 mg IV, Fentanyl 50 mcg IV, Ancef 2 g IV     Complications: None      Estimated Blood Loss: Estimated Blood Loss: 0-10 ml    Anticoagulation:      Specimens:      Findings: Successful  RIJ chest port placement    5/26/2023 9:43 AM

## 2023-05-31 ENCOUNTER — TELEPHONE (OUTPATIENT)
Dept: PRIMARY CARE | Facility: CLINIC | Age: 73
End: 2023-05-31
Payer: MEDICARE

## 2023-05-31 NOTE — TELEPHONE ENCOUNTER
sw pt in regards to scheduling ER follow up, pt was currently getting infusions; states that it takes her a little while to recover from her infusions and to reach out beginning of next week to schedule ER follow up

## 2023-06-12 ENCOUNTER — OFFICE VISIT (OUTPATIENT)
Dept: PRIMARY CARE | Facility: CLINIC | Age: 73
End: 2023-06-12
Payer: MEDICARE

## 2023-06-12 ENCOUNTER — HOSPITAL ENCOUNTER (OUTPATIENT)
Dept: RADIOLOGY | Facility: CLINIC | Age: 73
Discharge: HOME | End: 2023-06-12
Attending: FAMILY MEDICINE
Payer: MEDICARE

## 2023-06-12 VITALS
BODY MASS INDEX: 24.32 KG/M2 | HEIGHT: 61 IN | SYSTOLIC BLOOD PRESSURE: 128 MMHG | OXYGEN SATURATION: 98 % | HEART RATE: 88 BPM | TEMPERATURE: 98 F | RESPIRATION RATE: 16 BRPM | DIASTOLIC BLOOD PRESSURE: 80 MMHG | WEIGHT: 128.8 LBS

## 2023-06-12 DIAGNOSIS — I10 PRIMARY HYPERTENSION: ICD-10-CM

## 2023-06-12 DIAGNOSIS — I82.613: ICD-10-CM

## 2023-06-12 DIAGNOSIS — E78.2 MIXED HYPERLIPIDEMIA: ICD-10-CM

## 2023-06-12 DIAGNOSIS — I82.613: Primary | ICD-10-CM

## 2023-06-12 DIAGNOSIS — C82.19 FOLLICULAR LYMPHOMA GRADE II, EXTRANODAL AND SOLID ORGAN SITES: ICD-10-CM

## 2023-06-12 PROCEDURE — G8752 SYS BP LESS 140: HCPCS | Performed by: FAMILY MEDICINE

## 2023-06-12 PROCEDURE — G8754 DIAS BP LESS 90: HCPCS | Performed by: FAMILY MEDICINE

## 2023-06-12 PROCEDURE — 93971 EXTREMITY STUDY: CPT | Mod: RT

## 2023-06-12 PROCEDURE — 99214 OFFICE O/P EST MOD 30 MIN: CPT | Performed by: FAMILY MEDICINE

## 2023-06-12 NOTE — PROGRESS NOTES
Subjective      Patient ID: Muriel Franklin is a 73 y.o. female.      HPI    The following have been reviewed and updated as appropriate in this visit:   Tobacco  Allergies  Meds  Problems  Med Hx  Surg Hx  Fam Hx          73-year-old female with non-Hodgkin's follicular lymphoma, hypertension, COVID-19, hyperlipidemia, GERD, superficial DVT to bilateral upper extremities, cellulitis    She was admited  5/15-5/15 with cellulitis secondary to superficial thrombophlebitis   Was treated with keflex   She was seen in ER 5/23  Changed to doxycycline  uls showed again occlusive thrombus right basilic vein mid distal   Similar appearance of occlusive thrombus within the mid and distal portions of the right basilic vein.  No additional venous thrombosis within the remainder of the right upper extremity.      In note from oncologist noted on 5/31 cellulitis had completely resolved    She reports was getting better had mostly resolved ( was still lump but redness and tenderness had improved )     Now past few days noticed more tender over lump and procimal over elbow and in to medial upper arm - mild swelling     She is getting treatment with bendamustine and obinutuzumab next tx in 2 wks   And nulasta for neurtropenia   Had port plased about 2-3 weeks ago     Onc Dr Hendrickson through [facility]       Lab Results   Component Value Date    WBC 9.96 05/26/2023    HGB 13.2 05/26/2023    HCT 39.9 05/26/2023     05/26/2023    CHOL 158 12/15/2020    TRIG 88 12/15/2020    HDL 59 12/15/2020    ALT 23 05/23/2023    AST 29 05/23/2023     (L) 05/23/2023    K 4.4 05/23/2023    CL 95 (L) 05/23/2023    CREATININE 0.7 05/23/2023    BUN 17 05/23/2023    CO2 26 05/23/2023    TSH 2.02 10/18/2022    INR 1.0 05/26/2023    LDLCALC 82 12/15/2020        [facility] labs epic  5/31/2023    Units 12 d ago Comments    Glucose 70 - 99 mg/dL 87     Urea Nitrogen 8 - 20 mg/dL 12     Creatinine 0.44 - 1.03 mg/dL 0.59     Sodium 136 - 144 mmol/L 137      Potassium 3.6 - 5.1 mmol/L 4.1     Chloride 101 - 111 mmol/L 100 Low      Carbon Dioxide 22 - 32 mmol/L 28     Anion Gap 3 - 12 9     Calcium 8.9 - 10.3 mg/dL 9.6     Protein, Total 6.1 - 7.9 g/dL 6.3     Albumin 3.5 - 5.1 g/dL 4.1     ALT 14 - 54 U/L 20     AST 15 - 41 U/L 23     Alkaline Phosphatase 38 - 126 U/L 68     Bilirubin, Total 0.3 - 1.2 mg/dL 0.3         ago     White Blood Cells 4.0 - 11.0 THO/uL 9.2    Red Blood Cells 3.80 - 5.30 MIL/uL 3.92    Hemoglobin 12.0 - 16.0 g/dL 12.0    Hematocrit 36 - 46 % 36    MCV 80 - 100 fL 92    MCH 27 - 33 pg 31    MCHC 31 - 36 g/dL 33    RDW 11.5 - 14.5 % 18.6 High     Platelets 150 - 400 THO/uL 172          Review of Systems      Current Outpatient Medications:   •  acetaminophen (TYLENOL) 325 mg tablet, Take 650 mg by mouth every 6 (six) hours as needed for moderate pain., Disp: , Rfl:   •  allopurinoL (ZYLOPRIM) 100 mg tablet, Take 100 mg by mouth daily., Disp: , Rfl:   •  cholecalciferol, vitamin D3, 1,000 unit capsule, Take 1,000 Units by mouth daily., Disp: , Rfl:   •  ibuprofen (MOTRIN) 600 mg tablet, Take 600 mg by mouth every 6 (six) hours as needed for moderate pain., Disp: , Rfl:   •  levothyroxine (SYNTHROID) 50 mcg tablet, TAKE 1 TABLET BY MOUTH EVERY DAY, Disp: 90 tablet, Rfl: 1  •  MAGNESIUM ORAL, Take 1 tablet by mouth daily., Disp: , Rfl:   •  multivitamin tablet, Take 1 tablet by mouth daily., Disp: , Rfl:   •  prochlorperazine (COMPAZINE) 10 mg tablet, Take 10 mg by mouth every 6 (six) hours as needed for nausea or vomiting., Disp: , Rfl:   •  rosuvastatin (CRESTOR) 40 mg tablet, Take 40 mg by mouth nightly., Disp: , Rfl:   •  triamterene-hydrochlorothiazide (MAXZIDE-25) 37.5-25 mg per tablet, Take 0.5 tablets by mouth every morning., Disp: , Rfl:   Isopropyl alcohol, Sulfa (sulfonamide antibiotics), and Chlorhexidine  Past Medical History:   Diagnosis Date   • COVID 08/31/2022   • Hypertension    • Lipid disorder    • Lymphoma, non-Hodgkin's  "(CMS/HCC)    • 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    Physical Exam  Constitutional:       General: She is not in acute distress.     Appearance: Normal appearance. She is normal weight. She is not ill-appearing.   Neck:      Vascular: No carotid bruit.   Cardiovascular:      Rate and Rhythm: Normal rate and regular rhythm.      Pulses: Normal pulses.      Heart sounds: Normal heart sounds.   Pulmonary:      Effort: Pulmonary effort is normal.      Breath sounds: Normal breath sounds.   Musculoskeletal:        Arms:       Cervical back: No rigidity or tenderness.      Right lower leg: No edema.      Left lower leg: No edema.      Comments: Right arm no redness no warmth   Mild swelling and tenderness at elbow   Firm palp distal nodule and proximally medial upper arm mild tenderness    No swelling in hand      Lymphadenopathy:      Cervical: No cervical adenopathy.   Neurological:      Mental Status: She is alert.         Assessment/Plan   Problem List Items Addressed This Visit        Circulatory    Superficial venous thrombosis of upper extremity, bilateral - Primary    Relevant Orders    Ultrasound venous arm right (Completed)    Ultrasound venous arm right    Hypertension       Other    Follicular lymphoma grade II, extranodal and solid organ sites (CMS/HCC)    Mixed hyperlipidemia     There was clot superficial thrombophlebitis per heme in hosp did not require " anticoagulation    I do not see any significant cellulitis erythema or warmth that requires antibiotic -    Will get uls to make sure not DVT - and will contact her oncologist to review     Arranged for uls to be done after visit     There are no Patient Instructions on file for this visit.    Sidra Potter MD

## 2023-06-13 ENCOUNTER — TELEPHONE (OUTPATIENT)
Dept: PRIMARY CARE | Facility: CLINIC | Age: 73
End: 2023-06-13
Payer: MEDICARE

## 2023-06-13 NOTE — TELEPHONE ENCOUNTER
Patient is requesting a call back to discuss the test results from yesterday appointment. Please advise. Thank you.

## 2023-06-13 NOTE — TELEPHONE ENCOUNTER
Per Dr. Potter,  clot is still there, same as it was in the ER; Dr. Potter sw Dr. Hendrickson, advised that it's  for pt, Dr. Hendrickson to send a rx to pharmacy for xarelto for pt to take for a month; guy pt, to advise her of the rx and that the clot is still there/same as ER and that Dr. Tariq Hendrickson

## 2023-07-07 ENCOUNTER — HOSPITAL ENCOUNTER (OUTPATIENT)
Dept: RADIOLOGY | Facility: CLINIC | Age: 73
Discharge: HOME | End: 2023-07-07
Attending: NURSE PRACTITIONER
Payer: MEDICARE

## 2023-07-07 ENCOUNTER — TELEPHONE (OUTPATIENT)
Dept: PRIMARY CARE | Facility: CLINIC | Age: 73
End: 2023-07-07

## 2023-07-07 ENCOUNTER — OFFICE VISIT (OUTPATIENT)
Dept: PRIMARY CARE | Facility: CLINIC | Age: 73
End: 2023-07-07
Payer: MEDICARE

## 2023-07-07 VITALS
HEIGHT: 61 IN | WEIGHT: 129.8 LBS | RESPIRATION RATE: 14 BRPM | DIASTOLIC BLOOD PRESSURE: 78 MMHG | TEMPERATURE: 98.2 F | OXYGEN SATURATION: 97 % | HEART RATE: 107 BPM | BODY MASS INDEX: 24.51 KG/M2 | SYSTOLIC BLOOD PRESSURE: 126 MMHG

## 2023-07-07 DIAGNOSIS — Z86.72 HISTORY OF PHLEBITIS: ICD-10-CM

## 2023-07-07 DIAGNOSIS — M79.89 LEFT ARM SWELLING: ICD-10-CM

## 2023-07-07 DIAGNOSIS — I80.8 PHLEBITIS OF LEFT ARM: ICD-10-CM

## 2023-07-07 DIAGNOSIS — I80.8 PHLEBITIS OF LEFT ARM: Primary | ICD-10-CM

## 2023-07-07 PROCEDURE — 99214 OFFICE O/P EST MOD 30 MIN: CPT | Performed by: NURSE PRACTITIONER

## 2023-07-07 PROCEDURE — 93971 EXTREMITY STUDY: CPT | Mod: LT

## 2023-07-07 PROCEDURE — G8754 DIAS BP LESS 90: HCPCS | Performed by: NURSE PRACTITIONER

## 2023-07-07 PROCEDURE — G8752 SYS BP LESS 140: HCPCS | Performed by: NURSE PRACTITIONER

## 2023-07-07 RX ORDER — APIXABAN 5 MG/1
TABLET, FILM COATED ORAL
COMMUNITY
Start: 2023-06-13 | End: 2023-11-28

## 2023-07-07 RX ORDER — DOXYCYCLINE HYCLATE 100 MG
100 TABLET ORAL 2 TIMES DAILY
Qty: 20 TABLET | Refills: 0 | Status: SHIPPED | OUTPATIENT
Start: 2023-07-07 | End: 2023-07-17

## 2023-07-07 ASSESSMENT — ENCOUNTER SYMPTOMS
CONSTITUTIONAL NEGATIVE: 1
PSYCHIATRIC NEGATIVE: 1

## 2023-07-07 NOTE — LETTER
July 7, 2023     Patient: Muriel Franklin  YOB: 1950  Date of Visit: 7/7/2023    To Whom It May Concern:    It is my medical opinion that Muriel Franklin {Work release (duty restriction):55324}.    If you have any questions or concerns, please don't hesitate to call.         Sincerely,        MONET Calderon    CC: No Recipients

## 2023-07-07 NOTE — ASSESSMENT & PLAN NOTE
Sent to  Radiology for stat US of left arm - will contact with results     If positive for superficial clot will increase eliquis to 10 mg BID for 1 week and then back to 5 mg BID   Start doxycycline 100 mg BID for 10 days for cellulitis   Return to office as needed   Go to ER if redness/swelling worse

## 2023-07-07 NOTE — TELEPHONE ENCOUNTER
Spoke with pt , superficial thrombosis of left cephalic vein that is stable . Since she has signs of cellulitis as well , she will increase her eliquis to 10 mg BID for 1 week as well as take a 10 day course of doxycycline for treatment . She is aware if her symptoms worsen over the weekend, to go to the ER for immediate care.

## 2023-07-07 NOTE — PROGRESS NOTES
[facility]   [address], [address]  [place] [address]  Phone: [phone]  Fax:[phone]        Subjective      Patient ID: Muriel Franklin is a 73 y.o. female.  1950      Has nonhodgkins lymphoma and receiving chemo  Side effects have been terrible   Now has a port   In May she had a treatment with a peripheral IV   Goes to [facility] for treatments in [place]  Mothers day her R arm was infected had superficial phlebitis and cellulitis  went to [place] and was diagnosed, treated   Now Left arm with redness, swelling started yesterday left brachial area   No fevers   + tenderness          The following have been reviewed and updated as appropriate in this visit:   Allergies  Meds  Problems       Review of Systems   Constitutional: Negative.    Skin:        Left arm redness and swelling    Psychiatric/Behavioral: Negative.      Patient Active Problem List   Diagnosis   • Coronary arteriosclerosis in native artery   • Elevated LFTs   • Follicular lymphoma grade II, extranodal and solid organ sites (CMS/HCC)   • Gastro-esophageal reflux disease without esophagitis   • Hypertension   • Primary localized osteoarthritis of pelvic region and thigh   • Meningioma (CMS/HCC)   • Migraine with aura   • Mixed hyperlipidemia   • Non-Hodgkin lymphoma (CMS/HCC)   • TIA (transient ischemic attack)   • Shingles (herpes zoster) polyneuropathy   • Medicare annual wellness visit, subsequent   • Osteopenia of multiple sites   • Chronic constipation   • Diarrhea   • Rectal prolapse   • Acute left-sided thoracic back pain   • Fever   • Phlebitis of left arm   • Hypokalemia   • Hypomagnesemia   • Cellulitis   • Hyperlipidemia   • Hypothyroidism   • History of phlebitis      Past Medical History:   Diagnosis Date   • COVID 08/31/2022   • Hypertension    • Lipid disorder    • Lymphoma, non-Hodgkin's (CMS/HCC)    • 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) 04/03/2019   •  "Non-Hodgkin lymphoma (CMS/HCC)    • 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     Past Surgical History:   Procedure Laterality Date   • EYE SURGERY     • FOOT SURGERY     • HIP SURGERY     • KNEE SURGERY       Social History     Socioeconomic History   • Marital status:      Spouse name: None   • Number of children: None   • Years of education: None   • Highest education level: None   Tobacco Use   • Smoking status: Never   • Smokeless tobacco: Never   Vaping Use   • Vaping Use: Never used   Substance and Sexual Activity   • Alcohol use: Yes     Alcohol/week: 7.0 standard drinks of alcohol     Types: 7 Glasses of wine per week   • Drug use: No   • Sexual activity: Defer     Social Determinants of Health     Financial Resource Strain: Low Risk  (5/16/2023)    Overall Financial Resource Strain (CARDIA)    • Difficulty of Paying Living Expenses: Not hard at all   Food Insecurity: No Food Insecurity (5/23/2023)    Hunger Vital Sign    • Worried About Running Out of Food in the Last Year: Never true    • Ran Out of Food in the Last Year: Never true   Transportation Needs: No Transportation Needs (5/16/2023)    PRAPARE - Transportation    • Lack of Transportation (Medical): No    • Lack of Transportation (Non-Medical): No   Housing Stability: Unknown (5/16/2023)    Housing Stability Vital Sign    • Unable to Pay for Housing in the Last Year: No    • Unstable Housing in the Last Year: No     Objective     Vitals:    07/07/23 1142   BP: 126/78   BP Location: Left upper arm   Patient Position: Sitting   Pulse: (!) 107   Resp: 14   Temp: 36.8 °C (98.2 °F)   TempSrc: Temporal   SpO2: 97%   Weight: 58.9 kg (129 lb 12.8 oz)   Height: 1.549 m (5' 1\")     Body mass index is 24.53 kg/m².  Current Outpatient Medications   Medication Sig Dispense Refill   • acetaminophen (TYLENOL) 325 mg tablet Take 650 mg by mouth every 6 (six) hours as needed for moderate pain.     • allopurinoL (ZYLOPRIM) 100 mg tablet " Take 100 mg by mouth daily.     • cholecalciferol, vitamin D3, 1,000 unit capsule Take 1,000 Units by mouth daily.     • levothyroxine (SYNTHROID) 50 mcg tablet TAKE 1 TABLET BY MOUTH EVERY DAY 90 tablet 1   • MAGNESIUM ORAL Take 1 tablet by mouth daily.     • multivitamin tablet Take 1 tablet by mouth daily.     • prochlorperazine (COMPAZINE) 10 mg tablet Take 10 mg by mouth every 6 (six) hours as needed for nausea or vomiting.     • rosuvastatin (CRESTOR) 40 mg tablet Take 40 mg by mouth nightly.     • triamterene-hydrochlorothiazide (MAXZIDE-25) 37.5-25 mg per tablet Take 0.5 tablets by mouth every morning.     • ELIQUIS 5 mg tablet TAKE 2 TABLETS TWICE DAILY FOR 7 DAYS, THEN 1 TABLET TWICE DAILY     • ibuprofen (MOTRIN) 600 mg tablet Take 600 mg by mouth every 6 (six) hours as needed for moderate pain.       No current facility-administered medications for this visit.       Physical Exam  Vitals reviewed.   Constitutional:       Appearance: Normal appearance.   Cardiovascular:      Comments: Left brachial erythema, edema and tenderness   + peripheral pulses   Skin:     General: Skin is warm and dry.   Neurological:      Mental Status: She is alert and oriented to person, place, and time. Mental status is at baseline.   Psychiatric:         Mood and Affect: Mood normal.         Behavior: Behavior normal.         Assessment/Plan     Problem List Items Addressed This Visit        Infectious/Inflammatory    Phlebitis of left arm - Primary    Overview                Current Assessment & Plan     Sent to  Radiology for stat US of left arm - will contact with results     If positive for superficial clot will increase eliquis to 10 mg BID for 1 week and then back to 5 mg BID   Start doxycycline 100 mg BID for 10 days for cellulitis   Return to office as needed   Go to ER if redness/swelling worse          Relevant Orders    Ultrasound venous arm left       Other    History of phlebitis    Relevant Orders    Ultrasound  venous arm left     I spent 30 minutes on this date of service performing the following activities: obtaining history, performing examination, entering orders, documenting, preparing for visit, obtaining / reviewing records, providing counseling and education, independently reviewing study/studies, communicating results and coordinating care.      MONET Lopez   7/7/2023

## 2023-07-07 NOTE — PATIENT INSTRUCTIONS
Thank you for allowing me to participate in your care, it was a pleasure to care for you today. Should any questions or concerns arise, please call or message me on My Chart.     If positive for superficial clot will increase eliquis to 10 mg BID for 1 week and then back to 5 mg BID   Start doxycycline 100 mg BID for 10 days for cellulitis   Return to office as needed   Go to ER if redness/swelling worse

## 2023-07-11 ENCOUNTER — TELEPHONE (OUTPATIENT)
Dept: PRIMARY CARE | Facility: CLINIC | Age: 73
End: 2023-07-11
Payer: MEDICARE

## 2023-07-11 ENCOUNTER — HOSPITAL ENCOUNTER (EMERGENCY)
Facility: HOSPITAL | Age: 73
Discharge: HOME | End: 2023-07-11
Attending: EMERGENCY MEDICINE
Payer: MEDICARE

## 2023-07-11 VITALS
BODY MASS INDEX: 24.94 KG/M2 | SYSTOLIC BLOOD PRESSURE: 133 MMHG | WEIGHT: 127 LBS | DIASTOLIC BLOOD PRESSURE: 70 MMHG | HEIGHT: 60 IN | TEMPERATURE: 98.4 F | HEART RATE: 95 BPM | RESPIRATION RATE: 16 BRPM | OXYGEN SATURATION: 93 %

## 2023-07-11 DIAGNOSIS — I80.8 SUPERFICIAL THROMBOPHLEBITIS OF LEFT UPPER EXTREMITY: Primary | ICD-10-CM

## 2023-07-11 PROCEDURE — 99281 EMR DPT VST MAYX REQ PHY/QHP: CPT

## 2023-07-11 ASSESSMENT — ENCOUNTER SYMPTOMS
SHORTNESS OF BREATH: 0
COUGH: 0
CHILLS: 0
FEVER: 0
COLOR CHANGE: 1

## 2023-07-11 NOTE — DISCHARGE INSTRUCTIONS
Finish the increase in eliquis then go back to normal dosing   Warm compresses to the affected area  Follow up with your PCP for further evaluation   Return to the ED at any time for worsening symptoms.

## 2023-07-11 NOTE — TELEPHONE ENCOUNTER
Pt called stating that the phlebitis of he left arm is not going down and patient is almost out of Ellquis, don't know if the eliquis is working if the phlebitis is not going down.     Offered pt sds with HARRIS Simon, Pt refuse. Pt only wants as call back from [facility] about arm as soon as possible. Please advise.

## 2023-07-11 NOTE — TELEPHONE ENCOUNTER
Spoke with pt. Symptoms are no better of her left brachial phlebitis after being on eliquis BID for the last 4 days and 4 days of antibiotics. Red, warm to touch. No improvement.   Will go to ER for eval and treat- called [facility] to update as well as left message for Dr Hendrickson.

## 2023-07-11 NOTE — ED PROVIDER NOTES
Emergency Medicine Note  HPI   HISTORY OF PRESENT ILLNESS   Patient is a 73 year-old female and has a past medical history of Hypertension, Lipid disorder, Nodular lymphoma of lymph nodes of multiple sites, Non-Hodgkin lymphoma presents to ED for evaluation of left upper arm swelling onset 07/06. Patient was diagnosed with Superficial venous thrombosis of bilateral upper extremity on May 2023. Patient was started on Eliquis 5 mg. Pt reports the right arm got better right away. On 7/7 the left arm became red, warm and tender. She saw her PCP who increased her Eliquis to 10mg BID for 1 week and doxycycline. Patient states finishing 4 day course of Eliquis and doxycycline. Patient reports no improvement. Denies trauma, fevers, chills, chest pain, shortness of breath and other injuries.           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   Respiratory: Negative for cough and shortness of breath.    Cardiovascular: Negative for chest pain.   Musculoskeletal: Joint swelling: LUE swelling.        Left arm pain    Skin: Positive for color change.        Positive for warmth in the region           VITALS   ED Vitals     Date/Time Temp Pulse Resp BP SpO2 [facility]   07/11/23 1437 36.9 °C (98.4 °F) 95 16 133/70 93 % AG          Pulse Ox %: 93 % (07/11/23 1441)  Pulse Ox Interpretation: Normal (07/11/23 1441)           Physical Exam   PHYSICAL EXAM   Physical Exam  Vitals and nursing note reviewed.   Constitutional:       General: She is not in acute distress.  Eyes:      Conjunctiva/sclera: Conjunctivae normal.   Cardiovascular:      Rate and Rhythm: Normal rate.   Pulmonary:      Effort: Pulmonary effort is normal.   Musculoskeletal:         General: Tenderness present.   Skin:     Findings: Erythema present.      Comments: Erythema to left AC with a tender lump. Warm to touch. Normal ROM of arm, no bony ttp. Palpable radial pulse    Neurological:      Mental Status: She is alert. Mental status is at baseline.   Psychiatric:         Behavior: Behavior normal.           PROCEDURES   Procedures     DATA   Results     None          Imaging Results    None         No orders to display       Scoring tools                              ED Course & MDM   MDM / ED COURSE / CLINICAL IMPRESSION / DISPO     MDM    ED Course as of 07/11/23 1513   Tue Jul 11, 2023   1431 US left arm 7/7/23  IMPRESSION: There is no evidence of deep venous thrombosis. Stable chronic  thrombosis of the superficial left cephalic vein.    Similar to US on 5/15/23 [DB]   7706 Reassured patient that the ultrasound shows that the chronic thrombosis is stable.  She is on Eliquis 5 mg twice daily.  These do not become PEs.  Discussed warm compresses and following up with PCP.  All questions answered [DB]      ED Course User Index  [DB] Clarisa Godinez PA C     Clinical Impression      Superficial thrombophlebitis of left upper extremity        By signing my name below, I, Jeremías Soto, attest that this documentation has been prepared under the direction and in the presence of Clarisa Godinez PA-C  .     Clarisa DOOLEY PA-C  , personally performed the services described in  this documentation, as documented by the scribe in my presence, and it is both accurate and complete.       Jeremías Soto  07/11/23 1509       Clarisa Godinez PA C  07/11/23 4327

## 2023-07-12 NOTE — ED ATTESTATION NOTE
I have personally seen and examined Muriel Franklin.  I was involved in the care and medical decision making for this patient.    I reviewed and agree with physician assistant / nurse practitioner’s assessment and plan of care; any exceptions are documented below.      My focused history, examination, assessment and plan of care of Muriel Franklin is as follows:    Brief History:  HPI  73-year-old female who was recently diagnosed with a superficial thrombophlebitis and is on Eliquis presents because she did not have improvement to the left arm.  Patient was treated with doxycycline and noted no improvement.      Focused Physical Exam:  Physical Exam  Patient is awake alert in no acute distress.  Patient has mild erythema redness and tenderness to her left antecubital area consistent with a superficial thrombophlebitis.      Assessment / Plan / MDM:  MDM  Reviewed patient's ultrasound from 7/7/2023 which showed no evidence of DVT and a stable chronic superficial thrombophlebitis of the left cephalic vein.  Patient was reassured and was discharged home.               Gosia Marlow MD  07/11/23 2048

## 2023-08-15 ENCOUNTER — OFFICE VISIT (OUTPATIENT)
Dept: PRIMARY CARE | Facility: CLINIC | Age: 73
End: 2023-08-15
Payer: MEDICARE

## 2023-08-15 VITALS
SYSTOLIC BLOOD PRESSURE: 110 MMHG | BODY MASS INDEX: 25.39 KG/M2 | DIASTOLIC BLOOD PRESSURE: 78 MMHG | WEIGHT: 130 LBS | RESPIRATION RATE: 16 BRPM | HEART RATE: 84 BPM | TEMPERATURE: 98.2 F

## 2023-08-15 DIAGNOSIS — R09.82 PND (POST-NASAL DRIP): ICD-10-CM

## 2023-08-15 DIAGNOSIS — R30.0 DYSURIA: Primary | ICD-10-CM

## 2023-08-15 LAB
BILIRUBIN, POC: NEGATIVE
BLOOD URINE, POC: POSITIVE
CLARITY, POC: CLEAR
COLOR, POC: YELLOW
EXPIRATION DATE: ABNORMAL
GLUCOSE URINE, POC: NEGATIVE
KETONES, POC: NEGATIVE
LEUKOCYTE EST, POC: ABNORMAL
Lab: ABNORMAL
NITRITE, POC: NEGATIVE
PH, POC: 6
POCT MANUFACTURER: ABNORMAL
PROTEIN, POC: NEGATIVE
SPECIFIC GRAVITY, POC: 1.02
UROBILINOGEN, POC: 0.2

## 2023-08-15 PROCEDURE — 99213 OFFICE O/P EST LOW 20 MIN: CPT | Performed by: NURSE PRACTITIONER

## 2023-08-15 PROCEDURE — G8752 SYS BP LESS 140: HCPCS | Performed by: NURSE PRACTITIONER

## 2023-08-15 PROCEDURE — G8754 DIAS BP LESS 90: HCPCS | Performed by: NURSE PRACTITIONER

## 2023-08-15 PROCEDURE — 81002 URINALYSIS NONAUTO W/O SCOPE: CPT | Performed by: NURSE PRACTITIONER

## 2023-08-15 RX ORDER — NITROFURANTOIN 25; 75 MG/1; MG/1
100 CAPSULE ORAL 2 TIMES DAILY
Qty: 10 CAPSULE | Refills: 0 | Status: SHIPPED | OUTPATIENT
Start: 2023-08-15 | End: 2023-08-20

## 2023-08-15 ASSESSMENT — ENCOUNTER SYMPTOMS
DYSURIA: 1
FLANK PAIN: 0
CONSTITUTIONAL NEGATIVE: 1
DIFFICULTY URINATING: 0
GASTROINTESTINAL NEGATIVE: 1
RHINORRHEA: 0
SINUS PAIN: 0
RESPIRATORY NEGATIVE: 1
CARDIOVASCULAR NEGATIVE: 1
HEMATURIA: 0
FREQUENCY: 0
PSYCHIATRIC NEGATIVE: 1
SINUS PRESSURE: 0
SORE THROAT: 0

## 2023-08-15 NOTE — ASSESSMENT & PLAN NOTE
Urine dip + leukocytes and blood   Increase fluid intake   Complete antibiotic as prescribed - macrobid   We sent your urine to the lab for culture   Will call or message you with your urine culture results

## 2023-08-15 NOTE — PATIENT INSTRUCTIONS
Thank you for allowing me to participate in your care, it was a pleasure to care for you today. Should any questions or concerns arise, please call or message me on My Chart.     Zyrtec daily   flonase daily   Increase fluid intake      Increase fluid intake   Complete antibiotic as prescribed - macrobid   We sent your urine to the lab for culture   Will call or message you with your urine culture results

## 2023-08-15 NOTE — PROGRESS NOTES
[facility]   [address], [address]  [place] [address]  Phone: [phone]  Fax:[phone]        Subjective      Patient ID: Muriel Franklin is a 73 y.o. female.  1950      Some discomfort with urination   No frequency or urgency  Wanted to check for a UTI   No flank or pelvic pain     Coughing and congestion in throat , a lot of mucous and clearing of her throat on and off for the last week or so   +PND  No sinus pressure   Leaving at the end of the month and wont be here for the month of September   no recent fevers         The following have been reviewed and updated as appropriate in this visit:   Allergies  Meds  Problems       Review of Systems   Constitutional: Negative.    HENT: Positive for congestion. Negative for ear discharge, ear pain, postnasal drip, rhinorrhea, sinus pressure, sinus pain and sore throat.    Respiratory: Negative.    Cardiovascular: Negative.    Gastrointestinal: Negative.    Genitourinary: Positive for dysuria. Negative for difficulty urinating, dyspareunia, enuresis, flank pain, frequency, hematuria, pelvic pain and urgency.        Pelvic pressure    Psychiatric/Behavioral: Negative.      Patient Active Problem List   Diagnosis   • Coronary arteriosclerosis in native artery   • Elevated LFTs   • Follicular lymphoma grade II, extranodal and solid organ sites (CMS/HCC)   • Gastro-esophageal reflux disease without esophagitis   • Hypertension   • Primary localized osteoarthritis of pelvic region and thigh   • Meningioma (CMS/HCC)   • Migraine with aura   • Mixed hyperlipidemia   • Non-Hodgkin lymphoma (CMS/HCC)   • TIA (transient ischemic attack)   • Shingles (herpes zoster) polyneuropathy   • Medicare annual wellness visit, subsequent   • Osteopenia of multiple sites   • Chronic constipation   • Diarrhea   • Rectal prolapse   • Acute left-sided thoracic back pain   • Fever   • Phlebitis of left arm   • Hypokalemia   •  Hypomagnesemia   • Cellulitis   • Hyperlipidemia   • Hypothyroidism   • History of phlebitis   • PND (post-nasal drip)   • Dysuria      Past Medical History:   Diagnosis Date   • COVID 08/31/2022   • Hypertension    • Lipid disorder    • Lymphoma, non-Hodgkin's (CMS/HCC)    • 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) 04/03/2019   • Non-Hodgkin lymphoma (CMS/HCC)    • 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     Past Surgical History:   Procedure Laterality Date   • EYE SURGERY     • FOOT SURGERY     • HIP SURGERY     • KNEE SURGERY       Social History     Socioeconomic History   • Marital status:      Spouse name: None   • Number of children: None   • Years of education: None   • Highest education level: None   Tobacco Use   • Smoking status: Never   • Smokeless tobacco: Never   Vaping Use   • Vaping Use: Never used   Substance and Sexual Activity   • Alcohol use: Yes     Alcohol/week: 7.0 standard drinks of alcohol     Types: 7 Glasses of wine per week   • Drug use: No   • Sexual activity: Defer     Social Determinants of Health     Financial Resource Strain: Low Risk  (5/16/2023)    Overall Financial Resource Strain (CARDIA)    • Difficulty of Paying Living Expenses: Not hard at all   Food Insecurity: No Food Insecurity (5/23/2023)    Hunger Vital Sign    • Worried About Running Out of Food in the Last Year: Never true    • Ran Out of Food in the Last Year: Never true   Transportation Needs: No Transportation Needs (5/16/2023)    PRAPARE - Transportation    • Lack of Transportation (Medical): No    • Lack of Transportation (Non-Medical): No   Housing Stability: Unknown (5/16/2023)    Housing Stability Vital Sign    • Unable to Pay for Housing in the Last Year: No    • Unstable Housing in the Last Year: No     Objective     Vitals:    08/15/23 1505   BP: 110/78   Pulse: 84   Resp: 16   Temp: 36.8 °C (98.2 °F)   Weight: 59 kg (130 lb)     Body mass index is 25.39  kg/m².  Current Outpatient Medications   Medication Sig Dispense Refill   • acetaminophen (TYLENOL) 325 mg tablet Take 650 mg by mouth every 6 (six) hours as needed for moderate pain.     • apixaban (ELIQUIS) 5 mg tablet Take 1 tablet (5 mg total) by mouth 2 (two) times a day. 180 tablet 0   • cholecalciferol, vitamin D3, 1,000 unit capsule Take 1,000 Units by mouth daily.     • ibuprofen (MOTRIN) 600 mg tablet Take 600 mg by mouth every 6 (six) hours as needed for moderate pain.     • levothyroxine (SYNTHROID) 50 mcg tablet TAKE 1 TABLET BY MOUTH EVERY DAY 90 tablet 1   • MAGNESIUM ORAL Take 1 tablet by mouth daily.     • multivitamin tablet Take 1 tablet by mouth daily.     • nitrofurantoin, macrocrystal-monohydrate, (MACROBID) 100 mg capsule Take 1 capsule (100 mg total) by mouth 2 (two) times a day for 5 days. 10 capsule 0   • prochlorperazine (COMPAZINE) 10 mg tablet Take 10 mg by mouth every 6 (six) hours as needed for nausea or vomiting.     • rosuvastatin (CRESTOR) 40 mg tablet Take 40 mg by mouth nightly.     • triamterene-hydrochlorothiazide (MAXZIDE-25) 37.5-25 mg per tablet Take 0.5 tablets by mouth every morning.     • ELIQUIS 5 mg tablet TAKE 2 TABLETS TWICE DAILY FOR 7 DAYS, THEN 1 TABLET TWICE DAILY       No current facility-administered medications for this visit.       Physical Exam  Vitals reviewed.   Constitutional:       Appearance: Normal appearance. She is normal weight.   HENT:      Right Ear: Tympanic membrane, ear canal and external ear normal.      Left Ear: Tympanic membrane, ear canal and external ear normal.      Nose: Nose normal.      Mouth/Throat:      Mouth: Mucous membranes are moist.      Pharynx: Oropharynx is clear.   Cardiovascular:      Rate and Rhythm: Normal rate and regular rhythm.      Pulses: Normal pulses.      Heart sounds: Normal heart sounds.   Pulmonary:      Effort: Pulmonary effort is normal.      Breath sounds: Normal breath sounds.   Abdominal:      General:  Abdomen is flat. There is no distension.      Palpations: Abdomen is soft.      Tenderness: There is no abdominal tenderness. There is no right CVA tenderness or left CVA tenderness.   Musculoskeletal:      Cervical back: Neck supple.   Neurological:      General: No focal deficit present.      Mental Status: She is alert and oriented to person, place, and time. Mental status is at baseline.   Psychiatric:         Mood and Affect: Mood normal.         Behavior: Behavior normal.         Thought Content: Thought content normal.         Assessment/Plan     Diagnoses and all orders for this visit:    Dysuria (Primary)  Assessment & Plan:  Urine dip + leukocytes and blood   Increase fluid intake   Complete antibiotic as prescribed - macrobid   We sent your urine to the lab for culture   Will call or message you with your urine culture results     Orders:  -     POCT urinalysis dipstick  -     Urinalysis with Reflex to Microscopic  -     Urine culture    PND (post-nasal drip)  Assessment & Plan:  Zyrtec daily   flonase daily   Increase fluid intake         Other orders  -     nitrofurantoin, macrocrystal-monohydrate, (MACROBID) 100 mg capsule; Take 1 capsule (100 mg total) by mouth 2 (two) times a day for 5 days.         MONET Lopez   8/15/2023

## 2023-08-16 ENCOUNTER — TELEPHONE (OUTPATIENT)
Dept: PRIMARY CARE | Facility: CLINIC | Age: 73
End: 2023-08-16
Payer: MEDICARE

## 2023-08-16 LAB
APPEARANCE UR: CLEAR
BACTERIA #/AREA URNS HPF: ABNORMAL /HPF
BACTERIA UR CULT: NORMAL
BILIRUB UR QL STRIP: NEGATIVE
COLOR UR: YELLOW
GLUCOSE UR QL STRIP: NEGATIVE
HGB UR QL STRIP: NEGATIVE
HYALINE CASTS #/AREA URNS LPF: ABNORMAL /LPF
KETONES UR QL STRIP: NEGATIVE
LEUKOCYTE ESTERASE UR QL STRIP: ABNORMAL
NITRITE UR QL STRIP: NEGATIVE
PH UR STRIP: 6.5 [PH] (ref 5–8)
PROT UR QL STRIP: NEGATIVE
RBC #/AREA URNS HPF: ABNORMAL /HPF
SERVICE CMNT-IMP: ABNORMAL
SP GR UR STRIP: 1.01 (ref 1–1.03)
SQUAMOUS #/AREA URNS HPF: ABNORMAL /HPF
WBC #/AREA URNS HPF: ABNORMAL /HPF

## 2023-08-16 NOTE — TELEPHONE ENCOUNTER
Spoke to Ms Llamas who does not understand the current urine results seen in the portal. Informed her that partial results are returned. She acknowledges she has an abx. Awaiting for the culture results are back to isolate any bacteria growing and which ones they are. Will call here back with those results. Might take sx days. She acknowledges.

## 2023-10-11 ENCOUNTER — TRANSCRIBE ORDERS (OUTPATIENT)
Dept: SCHEDULING | Age: 73
End: 2023-10-11

## 2023-10-11 DIAGNOSIS — Z12.31 ENCOUNTER FOR SCREENING MAMMOGRAM FOR MALIGNANT NEOPLASM OF BREAST: Primary | ICD-10-CM

## 2023-10-27 ENCOUNTER — HOSPITAL ENCOUNTER (OUTPATIENT)
Dept: RADIOLOGY | Age: 73
Discharge: HOME | End: 2023-10-27
Attending: OBSTETRICS & GYNECOLOGY
Payer: MEDICARE

## 2023-10-27 DIAGNOSIS — Z12.31 ENCOUNTER FOR SCREENING MAMMOGRAM FOR MALIGNANT NEOPLASM OF BREAST: ICD-10-CM

## 2023-10-27 PROCEDURE — 77063 BREAST TOMOSYNTHESIS BI: CPT

## 2023-10-30 DIAGNOSIS — E78.00 PURE HYPERCHOLESTEROLEMIA: ICD-10-CM

## 2023-10-30 DIAGNOSIS — E03.9 HYPOTHYROIDISM, UNSPECIFIED TYPE: Primary | ICD-10-CM

## 2023-10-30 DIAGNOSIS — I10 ESSENTIAL HYPERTENSION: ICD-10-CM

## 2023-10-30 RX ORDER — LEVOTHYROXINE SODIUM 50 UG/1
TABLET ORAL
Qty: 90 TABLET | Refills: 0 | Status: SHIPPED | OUTPATIENT
Start: 2023-10-30 | End: 2024-03-12

## 2023-10-30 NOTE — TELEPHONE ENCOUNTER
Please order lipid and tsh and t4 to be done before next visit ( everything else done recently but starla)  And have her do fasting before dec visit

## 2023-11-06 DIAGNOSIS — E78.00 PURE HYPERCHOLESTEROLEMIA: ICD-10-CM

## 2023-11-06 DIAGNOSIS — E03.9 HYPOTHYROIDISM, UNSPECIFIED TYPE: Primary | ICD-10-CM

## 2023-11-06 DIAGNOSIS — I10 ESSENTIAL HYPERTENSION: ICD-10-CM

## 2023-11-06 NOTE — TELEPHONE ENCOUNTER
Jinny pt in regards to labs being ordered; per pt, put in a new script for blood work for other, since pt has a port and has been going to [facility] so they can access her port.

## 2023-11-27 ENCOUNTER — TELEPHONE (OUTPATIENT)
Dept: PRIMARY CARE | Facility: CLINIC | Age: 73
End: 2023-11-27
Payer: MEDICARE

## 2023-11-27 NOTE — TELEPHONE ENCOUNTER
Patient LVM at 11:29am asking for advice on taking a medication she has from a previous cold.     She currently has a cough and mucus that won't go away.     Please advise

## 2023-11-28 ENCOUNTER — OFFICE VISIT (OUTPATIENT)
Dept: PRIMARY CARE | Facility: CLINIC | Age: 73
End: 2023-11-28
Payer: MEDICARE

## 2023-11-28 VITALS
BODY MASS INDEX: 25.58 KG/M2 | HEART RATE: 85 BPM | TEMPERATURE: 97.9 F | SYSTOLIC BLOOD PRESSURE: 122 MMHG | DIASTOLIC BLOOD PRESSURE: 80 MMHG | OXYGEN SATURATION: 96 % | WEIGHT: 131 LBS

## 2023-11-28 DIAGNOSIS — J06.9 UPPER RESPIRATORY TRACT INFECTION, UNSPECIFIED TYPE: ICD-10-CM

## 2023-11-28 DIAGNOSIS — H00.011 HORDEOLUM EXTERNUM OF RIGHT UPPER EYELID: Primary | ICD-10-CM

## 2023-11-28 PROCEDURE — G8754 DIAS BP LESS 90: HCPCS | Performed by: NURSE PRACTITIONER

## 2023-11-28 PROCEDURE — G8752 SYS BP LESS 140: HCPCS | Performed by: NURSE PRACTITIONER

## 2023-11-28 PROCEDURE — 99214 OFFICE O/P EST MOD 30 MIN: CPT | Performed by: NURSE PRACTITIONER

## 2023-11-28 RX ORDER — TRIAMTERENE/HYDROCHLOROTHIAZID 37.5-25 MG
0.5 TABLET ORAL EVERY MORNING
Qty: 1 TABLET | Refills: 0 | COMMUNITY
Start: 2023-11-28 | End: 2025-01-20

## 2023-11-28 RX ORDER — ERYTHROMYCIN 5 MG/G
OINTMENT OPHTHALMIC
Qty: 3.5 G | Refills: 0 | Status: SHIPPED | OUTPATIENT
Start: 2023-11-28 | End: 2024-06-22

## 2023-11-28 RX ORDER — BENZONATATE 100 MG/1
100 CAPSULE ORAL 3 TIMES DAILY PRN
Qty: 30 CAPSULE | Refills: 0 | Status: SHIPPED | OUTPATIENT
Start: 2023-11-28 | End: 2023-12-08

## 2023-11-28 RX ORDER — AMOXICILLIN AND CLAVULANATE POTASSIUM 875; 125 MG/1; MG/1
1 TABLET, FILM COATED ORAL 2 TIMES DAILY
Qty: 14 TABLET | Refills: 0 | Status: SHIPPED | OUTPATIENT
Start: 2023-11-28 | End: 2023-12-05

## 2023-11-28 ASSESSMENT — ENCOUNTER SYMPTOMS
STRIDOR: 0
CONSTITUTIONAL NEGATIVE: 1
APNEA: 0
DIZZINESS: 0
HEADACHES: 0
CARDIOVASCULAR NEGATIVE: 1
CHOKING: 0
SORE THROAT: 0
SINUS PAIN: 0
SHORTNESS OF BREATH: 0
SINUS PRESSURE: 0
RHINORRHEA: 1
PSYCHIATRIC NEGATIVE: 1
WHEEZING: 0
LIGHT-HEADEDNESS: 0
COUGH: 1
TROUBLE SWALLOWING: 0
CHEST TIGHTNESS: 0

## 2023-11-28 NOTE — PATIENT INSTRUCTIONS
Thank you for allowing me to participate in your care, it was a pleasure to care for you today. Should any questions or concerns arise, please call or message me on My Chart. Chturi    Increase fluids   Rest   Tylenol or Ibuprofen as needed for body aches/fevers   Finish antibiotic as prescribed- augmentin as directed   Erythromycin ointment to eye stye , massage, warm compresses   Tessalon pearls as directed as needed for cough   Nasal saline for congestion as needed   Contact us if you are no better or worse

## 2023-11-28 NOTE — ASSESSMENT & PLAN NOTE
Increase fluids   Rest   Tylenol or Ibuprofen as needed for body aches/fevers   Finish antibiotic as prescribed- augmentin as directed   Erythromycin ointment to eye stye , massage, warm compresses   Tessalon pearls as directed as needed for cough   Nasal saline for congestion as needed   Contact us if you are no better or worse

## 2023-11-28 NOTE — PROGRESS NOTES
[facility]   [address], [address]  , [place] [address]  Phone: [phone]  Fax:[phone]        Subjective      Patient ID: Muriel Franklin is a 73 y.o. female.  1950      Positive for COVID 19 Oct 10 th   Still lingering with a cough and congestion   Sneezing   Tried many things OTC and not better   mucinex   No fevers or SOB ,unless she overly exerts herself   No night sweats    Stye of R eye for the last 2 weeks   Painful   Has been using warm compresses and no better   Has not been wearing makeup recently   Wanted to know if there was more that she could do to resolve it           The following have been reviewed and updated as appropriate in this visit:   Allergies  Meds  Problems       Review of Systems   Constitutional: Negative.    HENT: Positive for congestion and rhinorrhea. Negative for ear discharge, ear pain, postnasal drip, sinus pressure, sinus pain, sore throat and trouble swallowing.    Eyes:        Stye R eye    Respiratory: Positive for cough. Negative for apnea, choking, chest tightness, shortness of breath, wheezing and stridor.    Cardiovascular: Negative.    Neurological: Negative for dizziness, light-headedness and headaches.   Psychiatric/Behavioral: Negative.      Patient Active Problem List   Diagnosis    Coronary arteriosclerosis in native artery    Elevated LFTs    Follicular lymphoma grade II, extranodal and solid organ sites (CMS/HCC)    Gastro-esophageal reflux disease without esophagitis    Hypertension    Primary localized osteoarthritis of pelvic region and thigh    Meningioma (CMS/HCC)    Migraine with aura    Mixed hyperlipidemia    Non-Hodgkin lymphoma (CMS/HCC)    TIA (transient ischemic attack)    Shingles (herpes zoster) polyneuropathy    Medicare annual wellness visit, subsequent    Osteopenia of multiple sites    Chronic constipation    Diarrhea    Rectal prolapse    Acute left-sided thoracic back  pain    Fever    Phlebitis of left arm    Hypokalemia    Hypomagnesemia    Cellulitis    Hyperlipidemia    Hypothyroidism    History of phlebitis    PND (post-nasal drip)    Dysuria    Hordeolum externum of right upper eyelid    Upper respiratory tract infection      Past Medical History:   Diagnosis Date    COVID 08/31/2022    Hypertension     Lipid disorder     Lymphoma, non-Hodgkin's (CMS/HCC)     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) 04/03/2019    Non-Hodgkin lymphoma (CMS/HCC)     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     Past Surgical History:   Procedure Laterality Date    EYE SURGERY      FOOT SURGERY      HIP SURGERY      KNEE SURGERY       Social History     Socioeconomic History    Marital status:      Spouse name: None    Number of children: None    Years of education: None    Highest education level: None   Tobacco Use    Smoking status: Never    Smokeless tobacco: Never   Vaping Use    Vaping Use: Never used   Substance and Sexual Activity    Alcohol use: Yes     Alcohol/week: 7.0 standard drinks of alcohol     Types: 7 Glasses of wine per week    Drug use: No    Sexual activity: Defer     Social Determinants of Health     Financial Resource Strain: Low Risk  (5/16/2023)    Overall Financial Resource Strain (CARDIA)     Difficulty of Paying Living Expenses: Not hard at all   Food Insecurity: No Food Insecurity (5/23/2023)    Hunger Vital Sign     Worried About Running Out of Food in the Last Year: Never true     Ran Out of Food in the Last Year: Never true   Transportation Needs: No Transportation Needs (5/16/2023)    PRAPARE - Transportation     Lack of Transportation (Medical): No     Lack of Transportation (Non-Medical): No   Housing Stability: Unknown (5/16/2023)    Housing Stability Vital Sign     Unable to Pay for Housing in the Last Year: No     Unstable Housing in the Last Year: No     Objective      Vitals:    11/28/23 0941   BP: 122/80   BP Location: Left upper arm   Patient Position: Sitting   Pulse: 85   Temp: 36.6 °C (97.9 °F)   SpO2: 96%   Weight: 59.4 kg (131 lb)     Body mass index is 25.58 kg/m².  Current Outpatient Medications   Medication Sig Dispense Refill    acetaminophen (TYLENOL) 325 mg tablet Take 650 mg by mouth every 6 (six) hours as needed for moderate pain.      amoxicillin-pot clavulanate (AUGMENTIN) 875-125 mg per tablet Take 1 tablet by mouth 2 (two) times a day for 7 days. 14 tablet 0    benzonatate (TESSALON PERLES) 100 mg capsule Take 1 capsule (100 mg total) by mouth 3 (three) times a day as needed for cough for up to 10 days. 30 capsule 0    cholecalciferol, vitamin D3, 1,000 unit capsule Take 1,000 Units by mouth daily.      erythromycin (ILOTYCIN) 5 mg/gram (0.5 %) ophthalmic ointment Apply to right eye every 6 (six) hours. Apply 1cm strip to affected eye every 6 hours. 3.5 g 0    ibuprofen (MOTRIN) 600 mg tablet Take 600 mg by mouth every 6 (six) hours as needed for moderate pain.      levothyroxine (SYNTHROID) 50 mcg tablet TAKE 1 TABLET BY MOUTH EVERY DAY 90 tablet 0    MAGNESIUM ORAL Take 1 tablet by mouth daily.      multivitamin tablet Take 1 tablet by mouth daily.      rosuvastatin (CRESTOR) 40 mg tablet Take 40 mg by mouth nightly.      triamterene-hydrochlorothiazide (MAXZIDE-25) 37.5-25 mg per tablet Take 0.5 tablets by mouth every morning. 1 tablet 0     No current facility-administered medications for this visit.       Physical Exam  Vitals reviewed.   Constitutional:       Appearance: Normal appearance. She is normal weight.   HENT:      Right Ear: Tympanic membrane, ear canal and external ear normal.      Left Ear: Tympanic membrane, ear canal and external ear normal.      Nose: Nose normal.      Mouth/Throat:      Mouth: Mucous membranes are moist.      Pharynx: Oropharynx is clear.   Eyes:      General:         Right eye: Hordeolum present. No foreign  body or discharge.         Left eye: No foreign body, discharge or hordeolum.      Conjunctiva/sclera: Conjunctivae normal.      Pupils: Pupils are equal, round, and reactive to light.      Comments: Upper lid hordeolum of R eye    Cardiovascular:      Rate and Rhythm: Normal rate and regular rhythm.      Pulses: Normal pulses.      Heart sounds: Normal heart sounds.   Pulmonary:      Effort: Pulmonary effort is normal.      Breath sounds: Normal breath sounds.      Comments: Productive cough   Musculoskeletal:      Cervical back: Neck supple.   Skin:     General: Skin is warm and dry.   Neurological:      Mental Status: She is alert and oriented to person, place, and time. Mental status is at baseline.   Psychiatric:         Mood and Affect: Mood normal.         Behavior: Behavior normal.         Thought Content: Thought content normal.         Assessment/Plan     Diagnoses and all orders for this visit:    Hordeolum externum of right upper eyelid (Primary)    Upper respiratory tract infection, unspecified type  Assessment & Plan:  Increase fluids   Rest   Tylenol or Ibuprofen as needed for body aches/fevers   Finish antibiotic as prescribed- augmentin as directed   Erythromycin ointment to eye stye , massage, warm compresses   Tessalon pearls as directed as needed for cough   Nasal saline for congestion as needed   Contact us if you are no better or worse           Other orders  -     triamterene-hydrochlorothiazide (MAXZIDE-25) 37.5-25 mg per tablet; Take 0.5 tablets by mouth every morning.  -     amoxicillin-pot clavulanate (AUGMENTIN) 875-125 mg per tablet; Take 1 tablet by mouth 2 (two) times a day for 7 days.  -     erythromycin (ILOTYCIN) 5 mg/gram (0.5 %) ophthalmic ointment; Apply to right eye every 6 (six) hours. Apply 1cm strip to affected eye every 6 hours.  -     benzonatate (TESSALON PERLES) 100 mg capsule; Take 1 capsule (100 mg total) by mouth 3 (three) times a day as needed for cough for up to 10  days.         MONET Lopez   11/28/2023

## 2023-12-11 ENCOUNTER — OFFICE VISIT (OUTPATIENT)
Dept: PRIMARY CARE | Facility: CLINIC | Age: 73
End: 2023-12-11
Payer: MEDICARE

## 2023-12-11 ENCOUNTER — TELEPHONE (OUTPATIENT)
Dept: PRIMARY CARE | Facility: CLINIC | Age: 73
End: 2023-12-11

## 2023-12-11 VITALS
TEMPERATURE: 97.9 F | WEIGHT: 135 LBS | OXYGEN SATURATION: 99 % | HEIGHT: 60 IN | HEART RATE: 89 BPM | RESPIRATION RATE: 16 BRPM | BODY MASS INDEX: 26.5 KG/M2

## 2023-12-11 DIAGNOSIS — I10 PRIMARY HYPERTENSION: ICD-10-CM

## 2023-12-11 DIAGNOSIS — E03.9 HYPOTHYROIDISM, UNSPECIFIED TYPE: ICD-10-CM

## 2023-12-11 DIAGNOSIS — D32.9 MENINGIOMA (CMS/HCC): ICD-10-CM

## 2023-12-11 DIAGNOSIS — I25.10 CORONARY ARTERIOSCLEROSIS IN NATIVE ARTERY: ICD-10-CM

## 2023-12-11 DIAGNOSIS — E78.00 PURE HYPERCHOLESTEROLEMIA: ICD-10-CM

## 2023-12-11 DIAGNOSIS — C82.10 FOLLICULAR LYMPHOMA GRADE II, UNSPECIFIED BODY REGION (CMS/HCC): ICD-10-CM

## 2023-12-11 DIAGNOSIS — Z00.00 MEDICARE ANNUAL WELLNESS VISIT, SUBSEQUENT: Primary | ICD-10-CM

## 2023-12-11 DIAGNOSIS — J06.9 UPPER RESPIRATORY TRACT INFECTION, UNSPECIFIED TYPE: ICD-10-CM

## 2023-12-11 DIAGNOSIS — C82.19 FOLLICULAR LYMPHOMA GRADE II, EXTRANODAL AND SOLID ORGAN SITES: ICD-10-CM

## 2023-12-11 DIAGNOSIS — E78.2 MIXED HYPERLIPIDEMIA: ICD-10-CM

## 2023-12-11 PROBLEM — Z86.72 HISTORY OF PHLEBITIS: Status: RESOLVED | Noted: 2023-07-07 | Resolved: 2023-12-11

## 2023-12-11 PROBLEM — R09.82 PND (POST-NASAL DRIP): Status: RESOLVED | Noted: 2023-08-15 | Resolved: 2023-12-11

## 2023-12-11 PROBLEM — R50.9 FEVER: Status: RESOLVED | Noted: 2023-05-15 | Resolved: 2023-12-11

## 2023-12-11 PROBLEM — H00.011 HORDEOLUM EXTERNUM OF RIGHT UPPER EYELID: Status: RESOLVED | Noted: 2023-11-28 | Resolved: 2023-12-11

## 2023-12-11 PROBLEM — R19.7 DIARRHEA: Status: RESOLVED | Noted: 2022-04-27 | Resolved: 2023-12-11

## 2023-12-11 PROBLEM — K62.3 RECTAL PROLAPSE: Status: RESOLVED | Noted: 2022-04-27 | Resolved: 2023-12-11

## 2023-12-11 PROBLEM — B02.23 SHINGLES (HERPES ZOSTER) POLYNEUROPATHY: Status: RESOLVED | Noted: 2019-04-03 | Resolved: 2023-12-11

## 2023-12-11 PROBLEM — L03.90 CELLULITIS: Status: RESOLVED | Noted: 2023-05-16 | Resolved: 2023-12-11

## 2023-12-11 PROBLEM — R30.0 DYSURIA: Status: RESOLVED | Noted: 2023-08-15 | Resolved: 2023-12-11

## 2023-12-11 PROCEDURE — 99213 OFFICE O/P EST LOW 20 MIN: CPT | Mod: 25 | Performed by: FAMILY MEDICINE

## 2023-12-11 PROCEDURE — G8756 NO BP MEASURE DOC: HCPCS | Performed by: FAMILY MEDICINE

## 2023-12-11 PROCEDURE — G0439 PPPS, SUBSEQ VISIT: HCPCS | Performed by: FAMILY MEDICINE

## 2023-12-11 ASSESSMENT — PATIENT HEALTH QUESTIONNAIRE - PHQ9: SUM OF ALL RESPONSES TO PHQ9 QUESTIONS 1 & 2: 0

## 2023-12-11 ASSESSMENT — MINI COG
COMPLETED: YES
TOTAL SCORE: 5

## 2023-12-11 NOTE — PROGRESS NOTES
Subjective     Muriel Franklin is a 73 y.o. female who presents for a subsequent annual wellness visit.     Patient Care Team:  Sidra Potter MD as PCP - General (Family Medicine)  Jarad Hendrickson MD as Referring Physician  Gabi Richardson MD as Obstetrician (Obstetrics and Gynecology)  Berkley Da Silva MD as Medical Oncologist (Hematology and Oncology)    Sinus  Sx antibiotic coughing like crazy   Took augmentin   Had covid few mos ago   No SOB no wheeze     Goes for walks for exercise     Lymphoma   Had CT done at [place] -  Meets with Dr Hendrickson next week and     Dr Jameson lyphoma specialist for additional chemo     Slight increase in abd lymphnodes     Did get rsv vaccine       tsh 1.80 nl 12/6/23  [facility]   Total chol 140  ldl 71  tg 136  hdl 42  Cbc Dr Hendrickson wnl         Mammo 10/27/2023 finn  Lab Results   Component Value Date    WBC 9.96 05/26/2023    HGB 13.2 05/26/2023    HCT 39.9 05/26/2023     05/26/2023    CHOL 158 12/15/2020    TRIG 88 12/15/2020    HDL 59 12/15/2020    ALT 23 05/23/2023    AST 29 05/23/2023     (L) 05/23/2023    K 4.4 05/23/2023    CL 95 (L) 05/23/2023    CREATININE 0.7 05/23/2023    BUN 17 05/23/2023    CO2 26 05/23/2023    TSH 2.02 10/18/2022    INR 1.0 05/26/2023    LDLCALC 82 12/15/2020       Cardio   Cardio letter reviewed 10/28 2022   CAD cath 2014 no futher peralta needed per cardio at present    Patent foramen ovale per cardio echo bubble study not though enough to warrant anticoagulation   Chol rosuvastatin 40      Oncologist Dr Hendrickson       Comprehensive Medical and Social History  Patient Active Problem List   Diagnosis   • Coronary arteriosclerosis in native artery   • Elevated LFTs   • Follicular lymphoma grade II, extranodal and solid organ sites (CMS/HCC)   • Gastro-esophageal reflux disease without esophagitis   • Hypertension   • Primary localized osteoarthritis of pelvic region and thigh   • Meningioma (CMS/HCC)   • Migraine with aura   • Mixed  hyperlipidemia   • Non-Hodgkin lymphoma (CMS/HCC)   • TIA (transient ischemic attack)   • Medicare annual wellness visit, subsequent   • Osteopenia of multiple sites   • Chronic constipation   • Acute left-sided thoracic back pain   • Phlebitis of left arm   • Hypokalemia   • Hypomagnesemia   • Hyperlipidemia   • Hypothyroidism   • Upper respiratory tract infection     Past Medical History:   Diagnosis Date   • COVID 08/31/2022   • Hypertension    • Lipid disorder    • Lymphoma, non-Hodgkin's (CMS/HCC)    • 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) 04/03/2019   • Non-Hodgkin lymphoma (CMS/HCC)    • Secondary malignant peritoneal deposit (CMS/[facility]) 02/06/2019     Past Surgical History:   Procedure Laterality Date   • EYE SURGERY     • FOOT SURGERY     • HIP SURGERY     • KNEE SURGERY       Allergies   Allergen Reactions   • Isopropyl Alcohol      Other reaction(s): Blisters    • Sulfa (Sulfonamide Antibiotics)      Other reaction(s): thought to cause TIA  Other reaction(s): foot soreness and blistering of left foot after surgery  Other reaction(s): hematoma and blistering after surgery   • Chlorhexidine Rash     Current Outpatient Medications   Medication Sig Dispense Refill   • acetaminophen (TYLENOL) 325 mg tablet Take 650 mg by mouth every 6 (six) hours as needed for moderate pain.     • cholecalciferol, vitamin D3, 1,000 unit capsule Take 1,000 Units by mouth daily.     • erythromycin (ILOTYCIN) 5 mg/gram (0.5 %) ophthalmic ointment Apply to right eye every 6 (six) hours. Apply 1cm strip to affected eye every 6 hours. 3.5 g 0   • ibuprofen (MOTRIN) 600 mg tablet Take 600 mg by mouth every 6 (six) hours as needed for moderate pain.     • levothyroxine (SYNTHROID) 50 mcg tablet TAKE 1 TABLET BY MOUTH EVERY DAY 90 tablet 0   • MAGNESIUM ORAL Take 1 tablet by mouth daily.     • multivitamin tablet Take 1 tablet by mouth daily.     • rosuvastatin (CRESTOR) 40 mg tablet Take 40 mg by mouth nightly.      • triamterene-hydrochlorothiazide (MAXZIDE-25) 37.5-25 mg per tablet Take 0.5 tablets by mouth every morning. 1 tablet 0     No current facility-administered medications for this visit.     Social History     Tobacco Use   • Smoking status: Never   • Smokeless tobacco: Never   Vaping Use   • Vaping Use: Never used   Substance Use Topics   • Alcohol use: Yes     Alcohol/week: 7.0 standard drinks of alcohol     Types: 7 Glasses of wine per week   • Drug use: No     Family History   Problem Relation Age of Onset   • Arthritis Biological Mother    • Heart attack Biological Father    • Heart disease Biological Brother        Objective   Vitals  Vitals:    12/11/23 1038   Pulse: 89   Resp: 16   Temp: 36.6 °C (97.9 °F)   TempSrc: Temporal   SpO2: 99%   Weight: 61.2 kg (135 lb)   Height: 1.524 m (5')     Body mass index is 26.37 kg/m².    Advanced Care Plan                                        PHQ  Will the patient answer the depression questions?: Yes   Little interest or pleasure in doing things: Not at all   Feeling down, depressed, or hopeless: Not at all   Depression Risk: 0                                             Mini Cog  Completed: Yes  Score: 5  Result: Negative      Get Up and Go   normla     STEADI Falls Risk  One or more falls in the last year: No           Has trouble stepping up onto a curb: No   Advised to use a cane or walker to get around safely: No   Often has to rush to the toilet: No   Feels unsteady when walking: No   Has lost some feeling in feet: No   Often feels sad or depressed: No   Steadies self on furniture while walking at home: No   Takes medication that makes him/her feel lightheaded or more tired than usual: No   Worried about falling: No   Takes medicine to sleep or improve mood: No   Needs to push with hands when rising from a chair: No   Falls screen completed: Yes     Hearing and Vision Screening  No results found.  See HRA for relevant hearing screening response.    Diet and  Exercise            Assessment/Plan   Diagnoses and all orders for this visit:    Medicare annual wellness visit, subsequent (Primary)    Follicular lymphoma grade II, extranodal and solid organ sites (CMS/HCC)    Upper respiratory tract infection, unspecified type    Mixed hyperlipidemia    Hypothyroidism, unspecified type    Pure hypercholesterolemia    Coronary arteriosclerosis in native artery    Primary hypertension    Meningioma (CMS/HCC)    Follicular lymphoma grade II, unspecified body region (CMS/HCC)      disc uri - has had course of abx   rec Mucinex dm  12 hr tablets  And flonase   Disc can take time for mucous to clear   If sx worsen fever increasing sx call     Disc has followup with oncologist           See Patient Instructions (the written plan) which was given to the patient for PPPS and health risk factors with interventions.

## 2023-12-11 NOTE — PATIENT INSTRUCTIONS
Mucinex dm  12 hr tablets    And flonase                                  Your Personalized Prevention Plan Services (PPPS)    Preventive Services Checklist (Assumes Average Risk Unless Otherwise Noted):    Health Maintenance Topics with due status: Overdue       Topic Date Due    Zoster Vaccine 06/22/2021    Influenza Vaccine 08/01/2023    COVID-19 Vaccine 09/01/2023    DEXA Scan 09/27/2023    Medicare Annual Wellness Visit 12/07/2023     Health Maintenance Topics with due status: Not Due       Topic Last Completion Date    DTaP, Tdap, and Td Vaccines 02/01/2016    Colorectal Cancer Screening 10/08/2021    Depression Screening 05/15/2023    Breast Cancer Screening 10/27/2023    Falls Risk Screening 12/04/2023     Health Maintenance Topics with due status: Completed       Topic Last Completion Date    Hepatitis C Screening 06/20/2019    Pneumococcal (65 years and older) 08/26/2019     Health Maintenance Topics with due status: Aged Out       Topic Date Due    Meningococcal ACWY Aged Out    HIB Vaccines Aged Out    Hepatitis B Vaccines Aged Out    IPV Vaccines Aged Out    HPV Vaccines Aged Out       You May Be Eligible for These Additional Preventive Services   (Assumes Average Risk Unless Otherwise Noted)  Diabetes Screening Any 1 risk factor: hypertension, dyslipidemia, obesity, high glucose; or Any 2 risk factors: >=66yo, overweight, family history diabetes (covered every 6 months)   Hepatitis C Screening Any 1 risk factor: 1) blood transfusion before 1992,   2) current or past injection drug use (annually for high risk; if born between 2699-0977, see above for status).   Vaccine: Hepatitis B As necessary if at-risk: hemophilia, ESRD, diabetes, living with individual infected with hep B, healthcare worker with frequent contact with blood/bodily fluids (series covered once)   Sexually Transmitted Diseases (STDs) As necessary chlamydia, gonorrhea, syphilis, hepatitis B (covered annually)  HIV if any 1 risk factor  present: 1) <14yo or >66yo and at increased risk or 2) 15-66yo and ask for it (covered annually)   Lung Cancer Screening Low dose chest CT if all three risk factors: 1) 50-76yo, 2) smoker or quit within last 15y, 3) >=20 pack years (covered annually).  No results found for this or any previous visit.       Cholesterol Screening Both risk factors: 1) >=21yo and 2)  increased risk coronary artery disease (covered every 5 years).     Breast Cancer Screening Covered once 35-40yo, annually >=39yo (if >=49yo, see above for status).         Health Risk Factors with Personalized Education:  ----------------------------------------------------------------------------------------------------------------------  Controlling Your Blood Pressure  Maintain a normal weight (body mass index between 18.5 and 24.9).  Eat more fruit, vegetables and low-fat dairy.  Eat less saturated fat and total fat.  Lower your sodium (salt) intake.  Try to stay under 1500 mg per day, but if you cannot get your intake to be that low, at least lower it by 1000 mg.  Stay active.  Try to get at least 90 to 150 minutes of exercise per week.  Try brisk walking, swimming, bicycling or dancing.  Limit alcohol intake.  When you do consume alcohol, drink no more than 1 drink per day.  If you have been prescribed medication, take it regularly and exactly as prescribed.  Let your PCP know if you have any problems or questions about your medication.  Check your blood pressure at home or at the store.  Write down your readings and share them with your PCP  ----------------------------------------------------------------------------------------------------------------------  Controlling Your Cholesterol  Reduce the amount of saturated and trans fat in your diet.  Limit intake of red meat.  Consume only low-fat or non-fat/skim dairy.  Limit fried food.  Cook with vegetable oils.  Reduce your intake of sugary foods, sugary drinks and alcohol.  Eat a diet high in  fruit, vegetables and whole grains.  Get protein from fish, poultry and a small portion of nuts.  Stay active.  Try to get at least 90 to 150 minutes of exercise per week.  Try brisk walking, swimming, bicycling or dancing.  Maintain a healthy weight by balancing your diet and exercise.  If you have been prescribed medication, take it regularly and exactly as prescribed. Let your PCP know if you have any problems or questions about your medication.  It’s important to know your cholesterol numbers.  When recommended by your PCP, get the cholesterol blood test.  ----------------------------------------------------------------------------------------------------------------------  Controlling Your Osteopenia, Strengthening Your Bones  Try to get at least 90 to 150 minutes of weight-bearing exercise per week.  Ensure intake of at least 1200mg of calcium per day.  Eat foods high in calcium like milk and other dairy, green vegetables, fruit, canned fish with soft and edible bones, nuts, calcium-set tofu.  Some foods are calcium-fortified, like bread, cereal, fruit juices and mineral water.  Help your body make vitamin D by getting 10-15 minutes per day of sunlight.    Ensure intake of at least 600IU of vitamin D per day.  Eat foods high in vitamin D like oily fish (salmon, sardines, mackerel) and eggs.  Some foods are fortified with vitamin D, like dairy and cereals.  Avoid high amounts of caffeine and salt, since they can cause the body to loose calcium.  Limit alcohol intake, since it is associated with weaker bones and is associated with falls and fractures.  Limit intake of fizzy drinks.  If you have been prescribed medication, take it regularly and exactly as prescribed.  Let your PCP know if you have any problems or questions about your medication  ----------------------------------------------------------------------------------------------------------------------  Reducing Your Risk of Falls  Tell your PCP if any  of your medications make you feel tired, dizzy, lightheaded or off-balance.  Maintain coordination, flexibility and balance by ensuring regular physical activity.  Limit alcohol intake to 1 drink per day.  Consider avoiding all alcohol intake.  Ensure good vision.  Visit an ophthalmologist or optometrist regularly for vision screening or to make sure your glasses / contact lens prescription is correct.  If you need glasses or contacts, wear them.  When you get new glasses or contacts, take time to get used to them.  Do not wear sunglasses or tinted lenses when indoors.  Ensure good hearing.  Have your hearing checked if you are having trouble hearing, or family and friends think you cannot hear them.  If you need a hearing aid, be sure it fits well and wear it.  Get enough rest.  Ensure about 7-9 hours of sleep every day.  Get up slowly from your bed or chairs.  Do not start walking until you are sure you feel steady.  Wear non-skid, rubber-soled, low-heeled shoes.  Do not walk in socks, or in shoes and slippers with smooth soles.  If your PCP or therapist recommends using a cane or walker, use it regularly.  Make your home safer.  Increase lighting throughout the house, especially at the top and bottom of stairs.  Ensure lighting is easily turned on when getting up in the middle of the night.  Make sure there are two secure rails on all stairs.  Install grab bars in the bathtub / shower and near the toilet.  Consider using a shower chair and / or a hand-held shower.  Spread sand or salt on icy surfaces.  Beware of wet surfaces, which can be icy.  Tell your PCP if you have fallen.

## 2023-12-22 ENCOUNTER — TELEPHONE (OUTPATIENT)
Dept: PRIMARY CARE | Facility: CLINIC | Age: 73
End: 2023-12-22
Payer: MEDICARE

## 2023-12-22 NOTE — TELEPHONE ENCOUNTER
Patient LVM stating she was seen @  yesterday and diagnosed with walking pneumonia. She was prescribed abx however still coughing a lot. [facility] advised patient should contact our office if she should be taking anything additional than abx. Patient was prescribed doxycycline. Please advise. Thank you.

## 2023-12-26 RX ORDER — BENZONATATE 200 MG/1
200 CAPSULE ORAL 3 TIMES DAILY PRN
Qty: 20 CAPSULE | Refills: 0 | Status: SHIPPED | OUTPATIENT
Start: 2023-12-26 | End: 2024-01-05

## 2023-12-26 NOTE — TELEPHONE ENCOUNTER
I will send rx for tessalon  Cough med     Also she can take mucinex dm  ( not D)      Did [facility] do cxr if so get report      Finally if  Her sx are not improving in a week or so I should see her  In office or sooner if needed I am available this week including today

## 2024-03-11 DIAGNOSIS — E03.9 HYPOTHYROIDISM, UNSPECIFIED TYPE: Primary | ICD-10-CM

## 2024-03-12 RX ORDER — LEVOTHYROXINE SODIUM 50 UG/1
TABLET ORAL
Qty: 90 TABLET | Refills: 0 | Status: SHIPPED | OUTPATIENT
Start: 2024-03-12 | End: 2024-06-12 | Stop reason: SDUPTHER

## 2024-04-05 ENCOUNTER — OFFICE VISIT (OUTPATIENT)
Dept: PRIMARY CARE | Facility: CLINIC | Age: 74
End: 2024-04-05
Payer: MEDICARE

## 2024-04-05 ENCOUNTER — HOSPITAL ENCOUNTER (OUTPATIENT)
Dept: RADIOLOGY | Facility: CLINIC | Age: 74
Discharge: HOME | End: 2024-04-05
Attending: NURSE PRACTITIONER
Payer: MEDICARE

## 2024-04-05 VITALS
BODY MASS INDEX: 25.11 KG/M2 | OXYGEN SATURATION: 94 % | DIASTOLIC BLOOD PRESSURE: 78 MMHG | HEART RATE: 74 BPM | SYSTOLIC BLOOD PRESSURE: 124 MMHG | HEIGHT: 61 IN | WEIGHT: 133 LBS | TEMPERATURE: 97.7 F

## 2024-04-05 DIAGNOSIS — M25.532 LEFT WRIST PAIN: Primary | ICD-10-CM

## 2024-04-05 DIAGNOSIS — M25.532 LEFT WRIST PAIN: ICD-10-CM

## 2024-04-05 DIAGNOSIS — C82.19 FOLLICULAR LYMPHOMA GRADE II, EXTRANODAL AND SOLID ORGAN SITES: ICD-10-CM

## 2024-04-05 PROBLEM — D80.1 HYPOGAMMAGLOBULINEMIA (CMS/HCC): Status: ACTIVE | Noted: 2024-03-28

## 2024-04-05 PROBLEM — D84.9 IMMUNOCOMPROMISED STATE (CMS/HCC): Status: ACTIVE | Noted: 2024-04-05

## 2024-04-05 PROCEDURE — G8754 DIAS BP LESS 90: HCPCS | Performed by: NURSE PRACTITIONER

## 2024-04-05 PROCEDURE — G8752 SYS BP LESS 140: HCPCS | Performed by: NURSE PRACTITIONER

## 2024-04-05 PROCEDURE — 99213 OFFICE O/P EST LOW 20 MIN: CPT | Performed by: NURSE PRACTITIONER

## 2024-04-05 PROCEDURE — 73110 X-RAY EXAM OF WRIST: CPT | Mod: LT

## 2024-04-05 RX ORDER — PERPHENAZINE/AMITRIPTYLINE HCL 2 MG-25 MG
TABLET ORAL
COMMUNITY

## 2024-04-05 ASSESSMENT — ENCOUNTER SYMPTOMS
ARTHRALGIAS: 1
CONSTITUTIONAL NEGATIVE: 1

## 2024-04-05 NOTE — ASSESSMENT & PLAN NOTE
Ice   Ibuprofen as directed   Brace to left wrist until seen by ortho/hand specialist ( see below)   Xray of left wrist today , get copy for ortho

## 2024-04-05 NOTE — PATIENT INSTRUCTIONS
Thank you for allowing me to participate in your care, it was a pleasure to care for you today. Should any questions or concerns arise, please call or message me on My Chart.     Ice   Ibuprofen as directed   Brace to left wrist until seen by ortho/hand specialist ( see below)   Xray of left wrist today , get copy for ortho       *ERICK ANTOINE      [address] [facility], [place], [place]        [address] [place], [place]        *DR. BRAMBILA IN [place], [place], [facility]  [phone]        [facility]     SOHAILENRIQUE EMERY      [address] [facility] [place], [place]     [address], [phone]        [facility]      FLYNN PATI      [address] [place]       [address] [place], [phone]        [facility]      DEBBIE PIERSON; DELROY HUDSON; MANJU MACKAY      [address] [facility], [place]      [address] [facility], [facility] [place] [address],  [phone]        [facility]       MANJU MACKAY      [address] [facility], [facility]      [address] [facility] [place]      [address] [facility] [place] [place]  [phone]

## 2024-04-05 NOTE — PROGRESS NOTES
[facility]   [address], [address]  [place] [address]  Phone: [phone]  Fax:[phone]        Subjective      Patient ID: Muriel Franklin is a 74 y.o. female.  1950      Fell on Tuesday at pickleball   Fell on left wrist and hand   R handed   Wrist painful , limited ROM   Went to urgent care and did not have xrays of her wrist , only her hand which is fine but her wrist is no better   Using ice and ibuprofen at home   Can move fingers but not her wrist without pain           The following have been reviewed and updated as appropriate in this visit:   Allergies  Meds  Problems       Review of Systems   Constitutional: Negative.    Musculoskeletal:  Positive for arthralgias.        Left wrist pain      Patient Active Problem List   Diagnosis    Coronary arteriosclerosis in native artery    Elevated LFTs    Follicular lymphoma grade II, extranodal and solid organ sites (CMS/HCC)    Gastro-esophageal reflux disease without esophagitis    Hypertension    Primary localized osteoarthritis of pelvic region and thigh    Meningioma (CMS/HCC)    Migraine with aura    Mixed hyperlipidemia    Non-Hodgkin lymphoma (CMS/HCC)    TIA (transient ischemic attack)    Medicare annual wellness visit, subsequent    Osteopenia of multiple sites    Chronic constipation    Acute left-sided thoracic back pain    Phlebitis of left arm    Hypokalemia    Hypomagnesemia    Hyperlipidemia    Hypothyroidism    Upper respiratory tract infection    Immunocompromised state (CMS/HCC)    Hypogammaglobulinemia (CMS/HCC)    Left wrist pain      Past Medical History:   Diagnosis Date    COVID 08/31/2022    Hypertension     Lipid disorder     Lymphoma, non-Hodgkin's (CMS/HCC)     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) 04/03/2019    Non-Hodgkin lymphoma (CMS/HCC)     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     Past Surgical History:   Procedure Laterality  "Date    EYE SURGERY      FOOT SURGERY      HIP SURGERY      KNEE SURGERY       Social History     Socioeconomic History    Marital status:      Spouse name: None    Number of children: None    Years of education: None    Highest education level: None   Tobacco Use    Smoking status: Never    Smokeless tobacco: Never   Vaping Use    Vaping Use: Never used   Substance and Sexual Activity    Alcohol use: Yes     Alcohol/week: 7.0 standard drinks of alcohol     Types: 7 Glasses of wine per week    Drug use: No    Sexual activity: Defer     Social Determinants of Health     Financial Resource Strain: Low Risk  (5/16/2023)    Overall Financial Resource Strain (CARDIA)     Difficulty of Paying Living Expenses: Not hard at all   Food Insecurity: No Food Insecurity (5/23/2023)    Hunger Vital Sign     Worried About Running Out of Food in the Last Year: Never true     Ran Out of Food in the Last Year: Never true   Transportation Needs: No Transportation Needs (5/16/2023)    PRAPARE - Transportation     Lack of Transportation (Medical): No     Lack of Transportation (Non-Medical): No   Housing Stability: Unknown (5/16/2023)    Housing Stability Vital Sign     Unable to Pay for Housing in the Last Year: No     Unstable Housing in the Last Year: No     Objective     Vitals:    04/05/24 1205   BP: 124/78   BP Location: Right upper arm   Patient Position: Sitting   Pulse: 74   Temp: 36.5 °C (97.7 °F)   SpO2: 94%   Weight: 60.3 kg (133 lb)   Height: 1.549 m (5' 1\")     Body mass index is 25.13 kg/m².  Current Outpatient Medications   Medication Sig Dispense Refill    acetaminophen (TYLENOL) 325 mg tablet Take 650 mg by mouth every 6 (six) hours as needed for moderate pain.      cholecalciferol, vitamin D3, 1,000 unit capsule Take 1,000 Units by mouth daily.      ibuprofen (MOTRIN) 600 mg tablet Take 600 mg by mouth every 6 (six) hours as needed for moderate pain.      levothyroxine (SYNTHROID) 50 mcg tablet TAKE 1 TABLET BY " MOUTH EVERY DAY 90 tablet 0    MAGNESIUM ORAL Take 1 tablet by mouth daily.      multivitamin tablet Take 1 tablet by mouth daily.      rosuvastatin (CRESTOR) 40 mg tablet Take 40 mg by mouth nightly.      triamterene-hydrochlorothiazide (MAXZIDE-25) 37.5-25 mg per tablet Take 0.5 tablets by mouth every morning. 1 tablet 0    erythromycin (ILOTYCIN) 5 mg/gram (0.5 %) ophthalmic ointment Apply to right eye every 6 (six) hours. Apply 1cm strip to affected eye every 6 hours. (Patient not taking: Reported on 4/5/2024) 3.5 g 0    multivit-iron-FA-calcium-mins (WOMEN'S DAILY FORMULA) 18 mg iron-400 mcg-500 mg Ca tablet Womens Daily Formula       No current facility-administered medications for this visit.       Physical Exam  Vitals and nursing note reviewed.   Constitutional:       Appearance: Normal appearance.   Musculoskeletal:         General: Swelling, tenderness and signs of injury present. No deformity.      Left wrist: Swelling, tenderness and bony tenderness present. No deformity, effusion or lacerations. Decreased range of motion.   Skin:     General: Skin is warm and dry.   Neurological:      Mental Status: She is alert and oriented to person, place, and time. Mental status is at baseline.   Psychiatric:         Mood and Affect: Mood normal.         Behavior: Behavior normal.         Assessment/Plan     Diagnoses and all orders for this visit:    Left wrist pain (Primary)  Assessment & Plan:  Ice   Ibuprofen as directed   Brace to left wrist until seen by ortho/hand specialist ( see below)   Xray of left wrist today , get copy for ortho     Orders:  -     X-RAY WRIST LEFT 3+ VIEWS; Future    Follicular lymphoma grade II, extranodal and solid organ sites (CMS/HCC)           MONET Lopez   4/5/2024

## 2024-06-10 DIAGNOSIS — E03.9 HYPOTHYROIDISM, UNSPECIFIED TYPE: ICD-10-CM

## 2024-06-10 NOTE — TELEPHONE ENCOUNTER
Pt called [facility] requesting a 90 day supply for levothyroxine. Would like a call when this has been completed.

## 2024-06-12 ENCOUNTER — TELEPHONE (OUTPATIENT)
Dept: PRIMARY CARE | Facility: CLINIC | Age: 74
End: 2024-06-12
Payer: MEDICARE

## 2024-06-12 DIAGNOSIS — E03.9 HYPOTHYROIDISM, UNSPECIFIED TYPE: ICD-10-CM

## 2024-06-12 RX ORDER — LEVOTHYROXINE SODIUM 50 UG/1
50 TABLET ORAL DAILY
Qty: 30 TABLET | Refills: 0 | Status: SHIPPED | OUTPATIENT
Start: 2024-06-12 | End: 2024-07-11

## 2024-06-12 RX ORDER — LEVOTHYROXINE SODIUM 50 UG/1
50 TABLET ORAL DAILY
Qty: 90 TABLET | Refills: 0 | Status: CANCELLED | OUTPATIENT
Start: 2024-06-12

## 2024-06-12 NOTE — TELEPHONE ENCOUNTER
"30 day supply ordered.   \"Return in about 6 months (around 6/11/2024)\" needs to be scheduled.  "

## 2024-06-13 RX ORDER — LEVOTHYROXINE SODIUM 50 UG/1
50 TABLET ORAL DAILY
Qty: 90 TABLET | Refills: 0 | OUTPATIENT
Start: 2024-06-13

## 2024-06-20 ENCOUNTER — TELEPHONE (OUTPATIENT)
Dept: PRIMARY CARE | Facility: CLINIC | Age: 74
End: 2024-06-20

## 2024-06-20 NOTE — TELEPHONE ENCOUNTER
Called pt to schedule appt. Pt would still like to be assessed when possible. Pt stated she has experienced coughing up phlegm. Please advise

## 2024-06-20 NOTE — TELEPHONE ENCOUNTER
[facility] Appointment Request   Provider: Dr Potter   Appointment Type: SDS  Reason for Visit: Patient has had these symptoms since testing positive for Covid in October- Runny nose, sneezing and coughing. Nothing has helped   Available Day and Time:   Best Contact Number: cell [phone]

## 2024-06-21 ENCOUNTER — OFFICE VISIT (OUTPATIENT)
Dept: PRIMARY CARE | Facility: CLINIC | Age: 74
End: 2024-06-21
Payer: MEDICARE

## 2024-06-21 VITALS
HEIGHT: 61 IN | BODY MASS INDEX: 25.13 KG/M2 | SYSTOLIC BLOOD PRESSURE: 122 MMHG | TEMPERATURE: 98.3 F | HEART RATE: 78 BPM | DIASTOLIC BLOOD PRESSURE: 76 MMHG | OXYGEN SATURATION: 97 %

## 2024-06-21 DIAGNOSIS — C82.19 FOLLICULAR LYMPHOMA GRADE II, EXTRANODAL AND SOLID ORGAN SITES: ICD-10-CM

## 2024-06-21 DIAGNOSIS — J01.00 ACUTE NON-RECURRENT MAXILLARY SINUSITIS: Primary | ICD-10-CM

## 2024-06-21 DIAGNOSIS — D80.1 HYPOGAMMAGLOBULINEMIA (CMS/HCC): ICD-10-CM

## 2024-06-21 DIAGNOSIS — D84.9 IMMUNOCOMPROMISED STATE (CMS/HCC): ICD-10-CM

## 2024-06-21 DIAGNOSIS — C82.10 FOLLICULAR LYMPHOMA GRADE II, UNSPECIFIED BODY REGION (CMS/HCC): ICD-10-CM

## 2024-06-21 DIAGNOSIS — D32.9 MENINGIOMA (CMS/HCC): ICD-10-CM

## 2024-06-21 PROCEDURE — 99214 OFFICE O/P EST MOD 30 MIN: CPT | Performed by: FAMILY MEDICINE

## 2024-06-21 PROCEDURE — G8752 SYS BP LESS 140: HCPCS | Performed by: FAMILY MEDICINE

## 2024-06-21 PROCEDURE — G8754 DIAS BP LESS 90: HCPCS | Performed by: FAMILY MEDICINE

## 2024-06-21 RX ORDER — AMOXICILLIN AND CLAVULANATE POTASSIUM 875; 125 MG/1; MG/1
1 TABLET, FILM COATED ORAL 2 TIMES DAILY
Qty: 28 TABLET | Refills: 0 | Status: SHIPPED | OUTPATIENT
Start: 2024-06-21 | End: 2024-07-05

## 2024-06-21 NOTE — PROGRESS NOTES
Subjective      Patient ID: Muriel Franklin is a 74 y.o. female.      HPI    The following have been reviewed and updated as appropriate in this visit:       Allergies  Meds  Problems         Pt  with hx of follicular lymphoma with concern for progressive disease who has been having sx for past 9 mos since having covid last fall     Sick since covid in October    Was treated in fall for sinus infectin and then   Then was seen at  dx with ?  Pneumonia  tx fir cephlaosporin   And then also with dooxycycline         She has nto had any abx in the  past 3 mos   She currently has sinus pressure and cough  Sinus fullness   Running nose    Sneezing congestion     Taking flonase      Reviewed imaging from oncologistg  Visualized paranasal sinus normal  - MRI brain 5/2024     Ct  3/27/24 Mucosal thickening of the paranasal sinuses.       Broken wrist and foot       Lab Results   Component Value Date    WBC 9.96 05/26/2023    HGB 13.2 05/26/2023    HCT 39.9 05/26/2023     05/26/2023    CHOL 158 12/15/2020    TRIG 88 12/15/2020    HDL 59 12/15/2020    ALT 23 05/23/2023    AST 29 05/23/2023     (L) 05/23/2023    K 4.4 05/23/2023    CL 95 (L) 05/23/2023    CREATININE 0.7 05/23/2023    BUN 17 05/23/2023    CO2 26 05/23/2023    TSH 2.02 10/18/2022    INR 1.0 05/26/2023    LDLCALC 82 12/15/2020       Review of Systems      Current Outpatient Medications:     acetaminophen (TYLENOL) 325 mg tablet, Take 650 mg by mouth every 6 (six) hours as needed for moderate pain., Disp: , Rfl:     amoxicillin-pot clavulanate (AUGMENTIN) 875-125 mg per tablet, Take 1 tablet by mouth 2 (two) times a day for 14 days., Disp: 28 tablet, Rfl: 0    cholecalciferol, vitamin D3, 1,000 unit capsule, Take 1,000 Units by mouth daily., Disp: , Rfl:     ibuprofen (MOTRIN) 600 mg tablet, Take 600 mg by mouth every 6 (six) hours as needed for moderate pain., Disp: , Rfl:     levothyroxine (SYNTHROID) 50 mcg tablet, Take 1 tablet (50 mcg total)  "by mouth daily., Disp: 30 tablet, Rfl: 0    MAGNESIUM ORAL, Take 1 tablet by mouth daily., Disp: , Rfl:     multivit-iron-FA-calcium-mins (WOMEN'S DAILY FORMULA) 18 mg iron-400 mcg-500 mg Ca tablet, Womens Daily Formula, Disp: , Rfl:     multivitamin tablet, Take 1 tablet by mouth daily., Disp: , Rfl:     rosuvastatin (CRESTOR) 40 mg tablet, Take 40 mg by mouth nightly., Disp: , Rfl:     triamterene-hydrochlorothiazide (MAXZIDE-25) 37.5-25 mg per tablet, Take 0.5 tablets by mouth every morning., Disp: 1 tablet, Rfl: 0  Isopropyl alcohol, Sulfa (sulfonamide antibiotics), and Chlorhexidine  Past Medical History:   Diagnosis Date    COVID 08/31/2022    Hypertension     Lipid disorder     Lymphoma, non-Hodgkin's (CMS/HCC)     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) 04/03/2019    Non-Hodgkin lymphoma (CMS/HCC)     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    Physical Exam  Constitutional:       General: She is not in acute distress.     Appearance: Normal appearance. She is well-developed. She is not toxic-appearing.      Comments: congested   HENT:      Head: Normocephalic.      Right Ear: Tympanic membrane, ear canal and external ear normal.      Left Ear: Tympanic membrane, ear canal and external ear normal.      Nose: Congestion present. Mucosal edema: clear nasal discharge.     Mouth/Throat:      Pharynx: Uvula midline. Posterior oropharyngeal erythema present. No oropharyngeal exudate.      Tonsils: No tonsillar exudate.   Eyes:      General: Lids are normal. No scleral icterus.     Conjunctiva/sclera: " Conjunctivae normal.      Right eye: Right conjunctiva is not injected.      Left eye: Left conjunctiva is not injected.   Neck:      Thyroid: No thyroid mass.      Vascular: No carotid bruit.   Cardiovascular:      Rate and Rhythm: Normal rate and regular rhythm.      Heart sounds: Normal heart sounds, S1 normal and S2 normal.      No gallop.   Pulmonary:      Effort: No tachypnea or respiratory distress.      Breath sounds: Normal breath sounds. No decreased breath sounds, wheezing, rhonchi or rales.   Musculoskeletal:      Cervical back: Normal range of motion and neck supple.   Lymphadenopathy:      Head:      Right side of head: No submental, submandibular or preauricular adenopathy.      Left side of head: No submental or submandibular adenopathy.      Cervical: No cervical adenopathy.      Right cervical: No superficial, deep or posterior cervical adenopathy.     Left cervical: No superficial, deep or posterior cervical adenopathy.   Skin:     Findings: No rash.   Neurological:      General: No focal deficit present.      Mental Status: She is alert and oriented to person, place, and time.   Psychiatric:         Mood and Affect: Mood normal.         Behavior: Behavior normal. Behavior is cooperative.         Thought Content: Thought content normal.         Judgment: Judgment normal.         Assessment/Plan   Problem List Items Addressed This Visit          Nervous    Meningioma (CMS/HCC)       Infectious/Inflammatory    Acute non-recurrent maxillary sinusitis - Primary       Other    Follicular lymphoma grade II, extranodal and solid organ sites (CMS/HCC)    Non-Hodgkin lymphoma (CMS/HCC)    Immunocompromised state (CMS/HCC)    Hypogammaglobulinemia (CMS/HCC)     Disc concern with  ongoing sx -  has ahd imaging  of sinuses most recent MRI showed no sinusis  Did have recent CT of chest  no pneumonia   Concern with  immunedeficiency   She has follow up with her oncologist on Monday I rec she mention  it there  also   Will rx augmentin to cover ainus infectio    If sx persist -  not resolving this week  she needs to see ent     Use flonase      Also rec dw   Patient Instructions   See ent       Start antibiotic augmentin     Sidra Potter MD

## 2024-07-10 DIAGNOSIS — E03.9 HYPOTHYROIDISM, UNSPECIFIED TYPE: ICD-10-CM

## 2024-07-10 NOTE — TELEPHONE ENCOUNTER
Pt KERMIT on [facility] line on 7/10/24 @ 11:49 am stating that she has tried calling along w/ her pharmacy to request a refill for Levothyroxine for a 90 day supply w/ refills.

## 2024-07-11 RX ORDER — LEVOTHYROXINE SODIUM 50 UG/1
50 TABLET ORAL DAILY
Qty: 30 TABLET | Refills: 0 | Status: SHIPPED | OUTPATIENT
Start: 2024-07-11 | End: 2024-08-05 | Stop reason: SDUPTHER

## 2024-07-17 ENCOUNTER — TRANSCRIBE ORDERS (OUTPATIENT)
Dept: SCHEDULING | Age: 74
End: 2024-07-17

## 2024-07-17 DIAGNOSIS — J32.4 CHRONIC PANSINUSITIS: Primary | ICD-10-CM

## 2024-07-23 ENCOUNTER — TRANSCRIBE ORDERS (OUTPATIENT)
Dept: SCHEDULING | Facility: REHABILITATION | Age: 74
End: 2024-07-23

## 2024-07-23 DIAGNOSIS — M47.816 SPONDYLOSIS WITHOUT MYELOPATHY OR RADICULOPATHY, LUMBAR REGION: ICD-10-CM

## 2024-07-23 DIAGNOSIS — M67.952 UNSPECIFIED DISORDER OF SYNOVIUM AND TENDON, LEFT THIGH: ICD-10-CM

## 2024-07-23 DIAGNOSIS — M54.16 RADICULOPATHY, LUMBAR REGION: Primary | ICD-10-CM

## 2024-07-30 ENCOUNTER — HOSPITAL ENCOUNTER (OUTPATIENT)
Dept: RADIOLOGY | Age: 74
Discharge: HOME | End: 2024-07-30
Attending: OTOLARYNGOLOGY
Payer: MEDICARE

## 2024-07-30 DIAGNOSIS — J32.4 CHRONIC PANSINUSITIS: ICD-10-CM

## 2024-07-30 PROCEDURE — 70486 CT MAXILLOFACIAL W/O DYE: CPT

## 2024-08-05 ENCOUNTER — APPOINTMENT (OUTPATIENT)
Dept: LAB | Facility: CLINIC | Age: 74
End: 2024-08-05
Attending: FAMILY MEDICINE
Payer: MEDICARE

## 2024-08-05 ENCOUNTER — OFFICE VISIT (OUTPATIENT)
Dept: PRIMARY CARE | Facility: CLINIC | Age: 74
End: 2024-08-05
Payer: MEDICARE

## 2024-08-05 VITALS
TEMPERATURE: 97.5 F | DIASTOLIC BLOOD PRESSURE: 68 MMHG | HEART RATE: 84 BPM | WEIGHT: 137.2 LBS | RESPIRATION RATE: 16 BRPM | BODY MASS INDEX: 25.9 KG/M2 | OXYGEN SATURATION: 96 % | SYSTOLIC BLOOD PRESSURE: 118 MMHG | HEIGHT: 61 IN

## 2024-08-05 DIAGNOSIS — D84.9 IMMUNOCOMPROMISED STATE (CMS/HCC): ICD-10-CM

## 2024-08-05 DIAGNOSIS — E03.9 HYPOTHYROIDISM, UNSPECIFIED TYPE: ICD-10-CM

## 2024-08-05 DIAGNOSIS — G45.9 TIA (TRANSIENT ISCHEMIC ATTACK): ICD-10-CM

## 2024-08-05 DIAGNOSIS — J32.4 CHRONIC PANSINUSITIS: ICD-10-CM

## 2024-08-05 DIAGNOSIS — R10.32 GROIN PAIN, LEFT: ICD-10-CM

## 2024-08-05 DIAGNOSIS — C82.19 FOLLICULAR LYMPHOMA GRADE II, EXTRANODAL AND SOLID ORGAN SITES: Primary | ICD-10-CM

## 2024-08-05 PROBLEM — I80.8 PHLEBITIS OF LEFT ARM: Status: RESOLVED | Noted: 2023-05-16 | Resolved: 2024-08-05

## 2024-08-05 PROCEDURE — 99214 OFFICE O/P EST MOD 30 MIN: CPT | Performed by: FAMILY MEDICINE

## 2024-08-05 PROCEDURE — G8754 DIAS BP LESS 90: HCPCS | Performed by: FAMILY MEDICINE

## 2024-08-05 PROCEDURE — 36415 COLL VENOUS BLD VENIPUNCTURE: CPT

## 2024-08-05 PROCEDURE — G8752 SYS BP LESS 140: HCPCS | Performed by: FAMILY MEDICINE

## 2024-08-05 PROCEDURE — 84443 ASSAY THYROID STIM HORMONE: CPT

## 2024-08-05 PROCEDURE — 84439 ASSAY OF FREE THYROXINE: CPT

## 2024-08-05 RX ORDER — LEVOTHYROXINE SODIUM 50 UG/1
50 TABLET ORAL DAILY
Qty: 90 TABLET | Refills: 1 | Status: SHIPPED | OUTPATIENT
Start: 2024-08-05 | End: 2025-02-03

## 2024-08-05 RX ORDER — CELECOXIB 100 MG/1
100 CAPSULE ORAL 2 TIMES DAILY PRN
Qty: 30 CAPSULE | Refills: 0 | Status: SHIPPED | OUTPATIENT
Start: 2024-08-05 | End: 2024-11-12 | Stop reason: SDUPTHER

## 2024-08-05 RX ORDER — LEVOTHYROXINE SODIUM 50 UG/1
50 TABLET ORAL DAILY
Qty: 30 TABLET | Refills: 0 | Status: SHIPPED | OUTPATIENT
Start: 2024-08-05 | End: 2024-08-05 | Stop reason: SDUPTHER

## 2024-08-05 NOTE — PROGRESS NOTES
Subjective      Patient ID: Muriel Franklin is a 74 y.o. female.      HPI    The following have been reviewed and updated as appropriate in this visit:       Allergies  Meds  Problems         PT 74 with hx fo follicular lymphoma, hypertension hyperlipidemia, hypothyroidism and hx of TIA     Has seen Dr Saulo Jameson heme onc rec reviewed     Has seen Dr Rodriges Gloria fx metatarsal walking changes pain in left hip   Has evla with ortho Dr Jiang who did hip replacement [place]  said not hip replacement  -  hip look fine   So pain is muscular and  They rec Physical therapy has started PT at  [facility]     She has had 1 week of PT  not much better yet   She wants medication for pain   Dr Jiang would not giver her a steroid injection       Sinus  -   Saw ent  Dr Jimenez and had CT sees him on Wednesday  does show paranasal sinus disease     Going away in september     Left hip          Lab in care everywhere  tsh   Component 12/06/23 05/02/13   TSH 1.80 3.91     Lab Results   Component Value Date    WBC 9.96 05/26/2023    HGB 13.2 05/26/2023    HCT 39.9 05/26/2023     05/26/2023    CHOL 158 12/15/2020    TRIG 88 12/15/2020    HDL 59 12/15/2020    ALT 23 05/23/2023    AST 29 05/23/2023     (L) 05/23/2023    K 4.4 05/23/2023    CL 95 (L) 05/23/2023    CREATININE 0.7 05/23/2023    BUN 17 05/23/2023    CO2 26 05/23/2023    TSH 2.02 10/18/2022    INR 1.0 05/26/2023    LDLCALC 82 12/15/2020       Review of Systems      Current Outpatient Medications:     acetaminophen (TYLENOL) 325 mg tablet, Take 650 mg by mouth every 6 (six) hours as needed for moderate pain., Disp: , Rfl:     celecoxib (CeleBREX) 100 mg capsule, Take 1 capsule (100 mg total) by mouth 2 (two) times a day as needed for moderate pain., Disp: 30 capsule, Rfl: 0    cholecalciferol, vitamin D3, 1,000 unit capsule, Take 1,000 Units by mouth daily., Disp: , Rfl:     levothyroxine (SYNTHROID) 50 mcg tablet, Take 1 tablet (50 mcg total) by mouth daily., Disp: 30  "tablet, Rfl: 0    MAGNESIUM ORAL, Take 1 tablet by mouth daily., Disp: , Rfl:     multivit-iron-FA-calcium-mins (WOMEN'S DAILY FORMULA) 18 mg iron-400 mcg-500 mg Ca tablet, Womens Daily Formula, Disp: , Rfl:     rosuvastatin (CRESTOR) 40 mg tablet, Take 40 mg by mouth nightly., Disp: , Rfl:     triamterene-hydrochlorothiazide (MAXZIDE-25) 37.5-25 mg per tablet, Take 0.5 tablets by mouth every morning., Disp: 1 tablet, Rfl: 0    multivitamin tablet, Take 1 tablet by mouth daily., Disp: , Rfl:   Isopropyl alcohol, Sulfa (sulfonamide antibiotics), and Chlorhexidine  Past Medical History:   Diagnosis Date    COVID 08/31/2022    Hypertension     Lipid disorder     Lymphoma, non-Hodgkin's (CMS/HCC)     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) 04/03/2019    Non-Hodgkin lymphoma (CMS/HCC)     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    Physical Exam    Assessment/Plan   Problem List Items Addressed This Visit          Circulatory    TIA (transient ischemic attack)       Endocrine/Metabolic    Hypothyroidism    Relevant Medications    levothyroxine (SYNTHROID) 50 mcg tablet    Other Relevant Orders    TSH    T4, free    TSH    T4, free       Infectious/Inflammatory    Chronic pansinusitis       Other    Follicular lymphoma grade II, extranodal and solid organ sites (CMS/HCC) - Primary    Immunocompromised state (CMS/HCC)          tsh 1.80 nl 12/6/23  Whately lab recheck now     Disc will rx short course of celebrex for left groin thigh pain while she works with PT per ortho  But " if pain persists will need to followup with ortho   Disc cannot use celebrex for long term  only for short course ( can cause kidney ; GI cardio side effects with prolonged use)     She is following up ent regarding chronic sinus issues     Thyrod has no had labs done stil must get today         Patient Instructions   Try celebrex for pain can take twice a day for pain just for 2 weeks    Followup with  ent     Followup with ortho if hip groin pain persists     Get thyroid labs      Sidra Potter MD

## 2024-08-05 NOTE — PATIENT INSTRUCTIONS
Try celebrex for pain can take twice a day for pain just for 2 weeks    Followup with  ent     Followup with ortho if hip groin pain persists     Get thyroid labs

## 2024-08-06 LAB
T4 FREE SERPL-MCNC: 1.12 NG/DL (ref 0.58–1.64)
TSH SERPL DL<=0.05 MIU/L-ACNC: 3.71 MIU/L (ref 0.34–5.6)

## 2024-10-25 ENCOUNTER — HOSPITAL ENCOUNTER (EMERGENCY)
Facility: HOSPITAL | Age: 74
Discharge: HOME | End: 2024-10-25
Attending: STUDENT IN AN ORGANIZED HEALTH CARE EDUCATION/TRAINING PROGRAM | Admitting: STUDENT IN AN ORGANIZED HEALTH CARE EDUCATION/TRAINING PROGRAM
Payer: MEDICARE

## 2024-10-25 ENCOUNTER — APPOINTMENT (EMERGENCY)
Dept: RADIOLOGY | Facility: HOSPITAL | Age: 74
End: 2024-10-25
Payer: MEDICARE

## 2024-10-25 VITALS
DIASTOLIC BLOOD PRESSURE: 76 MMHG | RESPIRATION RATE: 17 BRPM | OXYGEN SATURATION: 94 % | SYSTOLIC BLOOD PRESSURE: 130 MMHG | BODY MASS INDEX: 25.11 KG/M2 | HEIGHT: 61 IN | HEART RATE: 78 BPM | TEMPERATURE: 98.7 F | WEIGHT: 133 LBS

## 2024-10-25 DIAGNOSIS — M54.50 ACUTE BILATERAL LOW BACK PAIN WITHOUT SCIATICA: Primary | ICD-10-CM

## 2024-10-25 PROCEDURE — 74176 CT ABD & PELVIS W/O CONTRAST: CPT

## 2024-10-25 PROCEDURE — 99284 EMERGENCY DEPT VISIT MOD MDM: CPT | Mod: 25

## 2024-10-25 PROCEDURE — 63700000 HC SELF-ADMINISTRABLE DRUG: Performed by: PHYSICIAN ASSISTANT

## 2024-10-25 RX ORDER — CYCLOBENZAPRINE HCL 10 MG
10 TABLET ORAL 3 TIMES DAILY PRN
Qty: 21 TABLET | Refills: 0 | Status: SHIPPED | OUTPATIENT
Start: 2024-10-25 | End: 2025-01-20

## 2024-10-25 RX ORDER — MORPHINE SULFATE 10 MG/.5ML
10 SOLUTION ORAL ONCE
Status: COMPLETED | OUTPATIENT
Start: 2024-10-25 | End: 2024-10-25

## 2024-10-25 RX ORDER — CYCLOBENZAPRINE HCL 10 MG
10 TABLET ORAL ONCE
Status: COMPLETED | OUTPATIENT
Start: 2024-10-25 | End: 2024-10-25

## 2024-10-25 RX ORDER — IBUPROFEN 600 MG/1
600 TABLET ORAL ONCE
Status: COMPLETED | OUTPATIENT
Start: 2024-10-25 | End: 2024-10-25

## 2024-10-25 RX ORDER — OXYCODONE AND ACETAMINOPHEN 5; 325 MG/1; MG/1
1 TABLET ORAL EVERY 6 HOURS PRN
Qty: 24 TABLET | Refills: 0 | Status: SHIPPED | OUTPATIENT
Start: 2024-10-25 | End: 2024-11-01

## 2024-10-25 RX ORDER — LIDOCAINE 560 MG/1
1 PATCH PERCUTANEOUS; TOPICAL; TRANSDERMAL ONCE
Status: DISCONTINUED | OUTPATIENT
Start: 2024-10-25 | End: 2024-10-25 | Stop reason: HOSPADM

## 2024-10-25 RX ADMIN — IBUPROFEN 600 MG: 600 TABLET, FILM COATED ORAL at 13:06

## 2024-10-25 RX ADMIN — CYCLOBENZAPRINE 10 MG: 10 TABLET, FILM COATED ORAL at 13:06

## 2024-10-25 RX ADMIN — LIDOCAINE 1 PATCH: 4 PATCH TOPICAL at 13:06

## 2024-10-25 RX ADMIN — MORPHINE SULFATE 10 MG: 10 SOLUTION ORAL at 13:06

## 2024-10-25 NOTE — ED PROVIDER NOTES
Emergency Medicine Note  HPI   HISTORY OF PRESENT ILLNESS     74 year old F with pmhx of HTN, HLD, non-hodgkin's lymphoma presents to the ED for evaluation. She reports atraumatic low back pain x two weeks.  States symptoms worsen with movement and laying flat.  Has been taking tramadol, ibuprofen, and Tylenol without significant relief.  Denies radiation of pain.  Denies paresthesias, focal weakness, loss of bladder/bowel control, ataxia, fever/chills, or urinary symptoms.  Had outpatient lumbar x-ray which revealed moderate degenerative changes of the lumbar and thoracic spine without any fracture or subluxation.  Scheduled for MRI on 10/29.  Sent to the ED by her oncologist for CT scan of further pain control.           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       Past Surgical History   Procedure Laterality Date    Eye surgery      Foot surgery      Hip surgery      Knee surgery         Family History   Problem Relation Name Age of Onset    Arthritis Biological Mother      Heart attack Biological Father      Heart disease Biological Brother         Social History     Tobacco Use    Smoking status: Never    Smokeless tobacco: Never   Vaping Use    Vaping status: Never Used   Substance Use Topics    Alcohol use: Yes     Alcohol/week: 7.0 standard drinks of alcohol     Types: 7 Glasses of wine per week    Drug use: No         Review of Systems   REVIEW OF SYSTEMS     Review of Systems      VITALS     ED Vitals      Date/Time Temp Pulse Resp BP SpO2 [facility]   10/25/24 1204 37.1 °C (98.7 °F) 78 17 130/76 94 % SDB                         Physical Exam   PHYSICAL EXAM     Physical  Exam  Vitals and nursing note reviewed.   Constitutional:       General: She is not in acute distress.     Appearance: Normal appearance. She is well-developed and normal weight. She is not ill-appearing.   HENT:      Head: Normocephalic and atraumatic.   Eyes:      Conjunctiva/sclera: Conjunctivae normal.   Pulmonary:      Effort: Pulmonary effort is normal. No respiratory distress.   Abdominal:      Tenderness: There is no right CVA tenderness or left CVA tenderness.   Musculoskeletal:         General: No deformity. Normal range of motion.      Cervical back: Normal range of motion and neck supple.      Comments: No C-spine, T-spine, L-spine vertebral body tenderness.     Skin:     General: Skin is warm and dry.   Neurological:      Mental Status: She is alert and oriented to person, place, and time. Mental status is at baseline.      Comments: Normal function of extensor hallucis longus/flexor hallucis longus bilaterally.  Deep peroneal sensation intact bilaterally. DP and PT pulses 2+ bilaterally. Normal gait.    Psychiatric:         Mood and Affect: Mood normal.         Behavior: Behavior normal.           PROCEDURES     Procedures     DATA     Results       None            Imaging Results              CT ABDOMEN PELVIS WITHOUT IV CONTRAST (Final result)  Result time 10/25/24 13:50:56      Final result                   Impression:    IMPRESSION:  Extensive conglomerate retroperitoneal and para-aortic lymph nodes are noted.  Enlarged mesenteric and cardiophrenic lymph nodes are seen.  Mildly prominent  left inguinal lymph node is noted.  These findings are concerning for  malignancy.                   Narrative:    CLINICAL HISTORY: Musculoskeletal neoplasm, monitor  low back pain; hx of lymphoma    COMMENT:  COMPARISON: CT abdomen and pelvis on 12/7/2017.    TECHNIQUE:  CT of the abdomen and pelvis was performed without intravenous  contrast. Coronal and sagittal reconstructed images were obtained.    CT DOSE:   One or more dose reduction techniques (e.g. automated exposure  control, adjustment of the mA and/or kV according to patient size, use of  iterative reconstruction technique) utilized for this examination.    FINDINGS:    Lung bases: Heart size is normal.  Trace left pleural effusion is noted.  Bronchiectasis seen at left lower lobe.  A few cardiophrenic lymph nodes are  noted, with the largest one measure 1.5 cm, increased from 0.8 cm in 2017.    Hepatobiliary:  Liver: Hepatic cyst is noted.  A few tiny hypodense lesions are seen in  liver, too small to characterize.  Gallbladder: Gallbladder is unremarkable.  Bile Ducts: No intrahepatic or extrahepatic biliary ductal dilatation    Spleen: The spleen is normal in size without focal abnormality.    Pancreas: No focal mass or ductal dilatation.    Adrenal glands: Normal in appearance.    Kidneys: No hydronephrosis or solid lesions.    GI Tract:  GE junction and stomach:  Unremarkable.  Small bowel:  Unremarkable.  Appendix:  Unremarkable.  Colon and rectum:  Colonic diverticulosis is noted    Pelvic organs and bladder: Limited evaluation due to beam hardening artifact.    Peritoneum/retroperitoneum: No free air or free fluid.    Lymph nodes: Extensive conglomerate retroperitoneal and para-aortic lymph nodes  are noted.  Multiple enlarged mesenteric lymph nodes are seen.  A 1 cm left  inguinal lymph node is noted.    Vessels: Normal non-contrast appearance.    Bones and soft tissues:  Soft tissues: Unremarkable.  Bones: Multilevel degenerative changes of lumbar spine are noted.  The  patient is status post bilateral hip arthroplasties.  Lumbar scoliosis is noted.                                      No orders to display       Scoring tools                                  ED Course & MDM   MDM / ED COURSE / CLINICAL IMPRESSION / DISPO     Medical Decision Making  Given History and Exam the patient appears to be at low risk for Spinal Cord Compression Syndrome, acute  Spinal Fracture, Vertebral Osteomyelitis, Epidural Abscess, Infected or Obstructing Kidney Stone.  Their presentation appears most likely to be secondary to non-emergent musculoskeletal etiology vs non-emergent disc herniation.  CT imaging was obtained due to her history of lymphoma which did not show any bony metastasis.  Extensive lymphadenopathy was seen which was similar to PET scan from July 2024.  Discussed need for back pain specialist as an outpatient.  Rx of Percocet and Flexeril sent to preferred pharmacy.      Problems Addressed:  Acute bilateral low back pain without sciatica: acute illness or injury    Amount and/or Complexity of Data Reviewed  Radiology: ordered. Decision-making details documented in ED Course.    Risk  Prescription drug management.        ED Course as of 10/25/24 1823   Fri Oct 25, 2024   1353 CT ABDOMEN PELVIS WITHOUT IV CONTRAST  IMPRESSION: Extensive conglomerate retroperitoneal and para-aortic lymph nodes are noted. Enlarged mesenteric and cardiophrenic lymph nodes are seen.  Mildly prominent left inguinal lymph node is noted.  These findings are concerning for malignancy. [CM]      ED Course User Index  [CM] Shilpi Toro PA C     Clinical Impression      Acute bilateral low back pain without sciatica     _________________       ED Disposition   Discharge                       Shilpi Toro PA C  10/25/24 1823

## 2024-10-25 NOTE — ED ATTESTATION NOTE
Procedures  Physical Exam  Review of Systems  [facility]    10/25/519563:58 PM  I have personally seen and examined the patient. I was involved in the care and medical decision making for this patient.    I reviewed and agree with the PA/NP/Resident's assessment and plan of care, with any exceptions as documented below.    My focused history, examination, assessment, and plan of care of Muriel Franklin is as follows:  The patient presents with atraumatic low back pain.  Patient with non-Hodgkin's lymphoma, plan to start immunotherapy in 2 weeks, has MRI of the spine scheduled in 1 week.  Reports atraumatic lower back pain, initially left-sided and now bilateral.  Does not radiate down the legs, no associated urinary retention/incontinence, saddle anesthesia, focal leg weakness, or sensory changes.  No fever or chills.    Exam: General: Alert, nontoxic, no acute distress  Abdomen: Soft and nontender, nondistended  Back: Diffuse mild tenderness to palpation over the bilateral lumbar paraspinal muscles, no focal vertebral point tenderness.  No step-off or deformity.  Ambulatory with steady gait.  Strength 5/5 in BLE.  Sensation light touch intact in BLE.    Impression/Plan/Medical Decision Makin-year-old female with non-Hodgkin's lymphoma who presents for atraumatic bilateral lower back pain.  No fever, recent surgery or trauma, urinary incontinence/retention, saddle anesthesia, or leg weakness to suggest spinal cord pathology.  Afebrile, vitals stable, no acute distress.  Exam as above.  Ambulatory with steady gait.    Plan for CTAP without contrast to assess for abnormality of the lumbar spine or other acute process, pain medication, reassess    Vital Signs Review: Vital signs have been reviewed. The oxygen saturation is  SpO2: 94 % which is normal.    I was physically present for the key/critical portions of the following procedures: None    This document was created using Dragon dictation software.  There might be  some typographical errors due to this technology.    We are in a period of time with increased volumes and decreased capacity.      Esequiel Mcmanus MD  10/25/24 [id number]

## 2024-10-25 NOTE — DISCHARGE INSTRUCTIONS
- Your CT scan shows multilevel degenerative changes of lumbar spine. The CT scan also shows multiple enlarged lymph nodes which is similar to your PET scan from July 2024.   - Alternate Flexeril & Percocet every 4 hours.  - Alternate Ibuprofen (600 mg) & Tylenol (500 mg) every 4 hours.   - Continue with your outpatient MRI as scheduled on 10/29   - Follow up with the back pain specialist as discussed.   - Return to the ED for worsening symptoms, numbness or weakness in your legs, loss of bladder or bowel control, fevers over 100.4, or any other new/concerning symptoms

## 2024-11-06 ENCOUNTER — TRANSCRIBE ORDERS (OUTPATIENT)
Dept: RADIOLOGY | Age: 74
End: 2024-11-06

## 2024-11-06 ENCOUNTER — HOSPITAL ENCOUNTER (OUTPATIENT)
Dept: RADIOLOGY | Age: 74
Discharge: HOME | End: 2024-11-06
Attending: OBSTETRICS & GYNECOLOGY
Payer: MEDICARE

## 2024-11-06 DIAGNOSIS — Z12.31 ENCOUNTER FOR SCREENING MAMMOGRAM FOR MALIGNANT NEOPLASM OF BREAST: ICD-10-CM

## 2024-11-06 DIAGNOSIS — Z12.31 ENCOUNTER FOR SCREENING MAMMOGRAM FOR MALIGNANT NEOPLASM OF BREAST: Primary | ICD-10-CM

## 2024-11-06 PROCEDURE — 77067 SCR MAMMO BI INCL CAD: CPT

## 2024-11-08 ENCOUNTER — TELEPHONE (OUTPATIENT)
Dept: PRIMARY CARE | Facility: CLINIC | Age: 74
End: 2024-11-08
Payer: MEDICARE

## 2024-11-08 NOTE — TELEPHONE ENCOUNTER
Patient called to ask if Dr Potter would be able to prescribe prednisone for her back pain. Pain is on the left side and down to the waist. Patient declined to schedule appointment at this time and would like to speak directly to Dr Potter.     Please advise

## 2024-11-11 NOTE — TELEPHONE ENCOUNTER
Spoke w/pt. She has been having back pain which is more intense on the L side. She rates pain as 10/10 during severe episodes, but episodes are intermittent. Pain has caused difficulty with sleeping and moving around. She does have NHL and oncologist ruled out her NHL as the etiology. She takes Percocet and Advil for pain and applies heat daily without adequate relief. Pt scheduled w/PA Kasey Fox on 11/12 for assessment.

## 2024-11-12 ENCOUNTER — OFFICE VISIT (OUTPATIENT)
Dept: PRIMARY CARE | Facility: CLINIC | Age: 74
End: 2024-11-12
Payer: MEDICARE

## 2024-11-12 VITALS
DIASTOLIC BLOOD PRESSURE: 60 MMHG | OXYGEN SATURATION: 94 % | TEMPERATURE: 98.6 F | SYSTOLIC BLOOD PRESSURE: 91 MMHG | HEART RATE: 101 BPM

## 2024-11-12 DIAGNOSIS — M54.50 CHRONIC LEFT-SIDED LOW BACK PAIN WITHOUT SCIATICA: Primary | ICD-10-CM

## 2024-11-12 DIAGNOSIS — G89.29 CHRONIC LEFT-SIDED LOW BACK PAIN WITHOUT SCIATICA: Primary | ICD-10-CM

## 2024-11-12 PROBLEM — S52.572D OTHER INTRAARTICULAR FRACTURE OF LOWER END OF LEFT RADIUS, SUBSEQUENT ENCOUNTER FOR CLOSED FRACTURE WITH ROUTINE HEALING: Status: ACTIVE | Noted: 2024-04-09

## 2024-11-12 PROCEDURE — G8752 SYS BP LESS 140: HCPCS | Performed by: PHYSICIAN ASSISTANT

## 2024-11-12 PROCEDURE — 99213 OFFICE O/P EST LOW 20 MIN: CPT | Performed by: PHYSICIAN ASSISTANT

## 2024-11-12 PROCEDURE — G8754 DIAS BP LESS 90: HCPCS | Performed by: PHYSICIAN ASSISTANT

## 2024-11-12 RX ORDER — CELECOXIB 100 MG/1
100 CAPSULE ORAL 2 TIMES DAILY PRN
Qty: 30 CAPSULE | Refills: 0 | Status: SHIPPED | OUTPATIENT
Start: 2024-11-12 | End: 2025-01-20

## 2024-11-12 RX ORDER — ACYCLOVIR 400 MG/1
400 TABLET ORAL EVERY 12 HOURS
COMMUNITY
Start: 2024-11-08

## 2024-11-12 RX ORDER — ALLOPURINOL 300 MG/1
TABLET ORAL
COMMUNITY
Start: 2024-10-21

## 2024-11-12 ASSESSMENT — PAIN SCALES - GENERAL: PAINLEVEL_OUTOF10: 0-NO PAIN

## 2024-11-12 NOTE — PROGRESS NOTES
JEREMIAS Self   [facility] in [place]   [address], [address]  [place] [address]  P: [phone]  F: [phone]         Reason for visit:    Chief Complaint   Patient presents with    Back Pain     Left sided that radiates down the leg and has been ongoing for about 2 1/2 weeks    Rash     On the right side of the abdomen that could be related to her treatment for lymphoma        Muriel GLYNN 1950 is a 74 y.o. female     SUBJECTIVE   History of Present Illness   New complaint:  Patient presents c/o: back pain x ongoing for months   States: on immunotherapy for NHL, on last round of first cycle of three  Gets steroids for her immunotherapy infusions which temporarily helps with back pain but back pain returns when steroids wear off  Requesting Rx for a steroid today   Reports: L sided back pain, stiffness, worse with certain movements  Denies: specific trauma or injury   Hx of same? Yes, acute on chronic Managed by ortho   Self-tx attempts/outcomes: nothing  OTC  Relevant Hx: longstanding Hx back pain, recent imaging negative   Questions: would like to know what she can do for pain mgmt      Social History   Social History     Tobacco Use    Smoking status: Never    Smokeless tobacco: Never   Vaping Use    Vaping status: Never Used   Substance Use Topics    Alcohol use: Yes     Alcohol/week: 7.0 standard drinks of alcohol     Types: 7 Glasses of wine per week    Drug use: No        Problem List   Patient Active Problem List   Diagnosis Code    Coronary arteriosclerosis in native artery I25.10    Elevated LFTs R79.89    Follicular lymphoma grade II, extranodal and solid organ sites (CMS/HCC) C82.19    Gastro-esophageal reflux disease without esophagitis K21.9    Hypertension I10    Primary localized osteoarthritis of pelvic region and thigh M16.10    Chronic left-sided low back pain without sciatica M54.50, G89.29    Meningioma (CMS/HCC) D32.9    Migraine with  aura G43.109    Mixed hyperlipidemia E78.2    TIA (transient ischemic attack) G45.9    Medicare annual wellness visit, subsequent Z00.00    Osteopenia of multiple sites M85.89    Chronic constipation K59.09    Acute left-sided thoracic back pain M54.6    Hypokalemia E87.6    Hypomagnesemia E83.42    Hyperlipidemia E78.5    Hypothyroidism E03.9    Upper respiratory tract infection J06.9    Immunocompromised state (CMS/HCC) D84.9    Hypogammaglobulinemia (CMS/[facility]) D80.1    Left wrist pain M25.532    Acute non-recurrent maxillary sinusitis J01.00    Chronic pansinusitis J32.4    Groin pain, left R10.32    Other intraarticular fracture of lower end of left radius, subsequent encounter for closed fracture with routine healing S52.572D        Allergies   Isopropyl alcohol, Sulfa (sulfonamide antibiotics), and Chlorhexidine      Medications     Current Outpatient Medications:     acyclovir (ZOVIRAX) 400 mg tablet, Take 400 mg by mouth every 12 (twelve) hours., Disp: , Rfl:     allopurinoL (ZYLOPRIM) 300 mg tablet, , Disp: , Rfl:     celecoxib (CeleBREX) 100 mg capsule, Take 1 capsule (100 mg total) by mouth 2 (two) times a day as needed for moderate pain., Disp: 30 capsule, Rfl: 0    cholecalciferol, vitamin D3, 1,000 unit capsule, Take 1,000 Units by mouth daily., Disp: , Rfl:     levothyroxine (SYNTHROID) 50 mcg tablet, Take 1 tablet (50 mcg total) by mouth daily., Disp: 90 tablet, Rfl: 1    MAGNESIUM ORAL, Take 1 tablet by mouth daily., Disp: , Rfl:     MOSUNETUZUMAB-AXGB IV, Infuse into a venous catheter., Disp: , Rfl:     multivit-iron-FA-calcium-mins (WOMEN'S DAILY FORMULA) 18 mg iron-400 mcg-500 mg Ca tablet, Womens Daily Formula, Disp: , Rfl:     multivitamin tablet, Take 1 tablet by mouth daily., Disp: , Rfl:     rosuvastatin (CRESTOR) 40 mg tablet, Take 40 mg by mouth nightly., Disp: , Rfl:     triamterene-hydrochlorothiazide (MAXZIDE-25) 37.5-25 mg per tablet, Take 0.5 tablets by mouth every morning., Disp: 1  tablet, Rfl: 0    acetaminophen (TYLENOL) 325 mg tablet, Take 650 mg by mouth every 6 (six) hours as needed for moderate pain. (Patient not taking: Reported on 11/12/2024), Disp: , Rfl:     cyclobenzaprine (FLEXERIL) 10 mg tablet, Take 1 tablet (10 mg total) by mouth 3 (three) times a day as needed for muscle spasms for up to 7 days., Disp: 21 tablet, Rfl: 0     Medical History    Past Medical History:  08/31/2022: COVID  No date: Hypertension  No date: Lipid disorder  No date: Lymphoma, non-Hodgkin's (CMS/HCC)  04/03/2019: Nodular lymphoma of lymph nodes of multiple sites (CMS/  HCC)  No date: Non-Hodgkin lymphoma (CMS/HCC)  02/06/2019: Secondary malignant peritoneal deposit (CMS/HCC)      Surgical History   Past Surgical History   Procedure Laterality Date    Eye surgery      Foot surgery      Hip surgery      Knee surgery          Family History   Family History   Problem Relation Name Age of Onset    Arthritis Biological Mother      Heart attack Biological Father      Heart disease Biological Brother          Review of Systems   Constitutional: denies fever, chills   Respiratory: denies shortness of breath  Cardiovascular: denies chest pain, palpitations  Gastrointestinal: denies abdominal pain, n/v/d  Genitourinary: denies dysuria, hematuria  Musculoskeletal: reports joint pain, myalgias  Skin: denies rash   Neurological: denies weakness, numbness  See HPI for abnormal systems and evaluation of them.     OBJECTIVE   Vital Signs   Wt Readings from Last 3 Encounters:   10/25/24 60.3 kg (133 lb)   08/05/24 62.2 kg (137 lb 3.2 oz)   04/05/24 60.3 kg (133 lb)     Temp Readings from Last 3 Encounters:   11/12/24 37 °C (98.6 °F) (Temporal)   10/25/24 37.1 °C (98.7 °F) (Temporal)   08/05/24 36.4 °C (97.5 °F) (Temporal)     BP Readings from Last 3 Encounters:   11/12/24 91/60   10/25/24 130/76   08/05/24 118/68     Pulse Readings from Last 3 Encounters:   11/12/24 (!) 101   10/25/24 78   08/05/24 84     There is no  height or weight on file to calculate BMI.  No LMP recorded. Patient is postmenopausal.      Physical Exam   Physical Exam  Vitals reviewed.   Constitutional:       General: She is not in acute distress.     Appearance: Normal appearance. She is not toxic-appearing.   HENT:      Head: Normocephalic and atraumatic.   Cardiovascular:      Rate and Rhythm: Normal rate and regular rhythm.      Pulses: Normal pulses.      Heart sounds: Normal heart sounds. No murmur heard.  Pulmonary:      Effort: Pulmonary effort is normal. No respiratory distress.      Breath sounds: Normal breath sounds.   Abdominal:      General: Bowel sounds are normal. There is no distension.      Palpations: Abdomen is soft.      Tenderness: There is no abdominal tenderness.   Musculoskeletal:      Cervical back: Normal range of motion and neck supple.   Skin:     General: Skin is warm and dry.      Findings: No lesion or rash.   Neurological:      General: No focal deficit present.      Mental Status: She is alert and oriented to person, place, and time.           Results   Lab Review:  No visits with results within 2 Month(s) from this visit.   Latest known visit with results is:   Appointment on 08/05/2024   Component Date Value    TSH 08/05/2024 3.71     Free T4 08/05/2024 1.12    .     ASSESSMENT & PLAN   Diagnosis & Treatment   Problem List Items Addressed This Visit       Chronic left-sided low back pain without sciatica - Primary    Current Assessment & Plan     - refill celebrex as previously prescribed by Dr. Potter  - explained we cannot prescribe oral steroid while pt is on immunotherapy  - offered PT, declined  - offered muscle relaxer, declined   - I sxs persist, advised pt to reach out to her ortho          Relevant Medications    celecoxib (CeleBREX) 100 mg capsule        Follow-up   Return if symptoms worsen or fail to improve.     Patient Instructions   - celebrex with food as needed  - if pain persists, please contact your  orthopedic doctor  - no steroids while undergoing immunotherapy            JEREMIAS Self  11/12/2024    I spent 22 minutes on this date of service performing the following activities: obtaining history, performing examination, entering orders, documenting, obtaining / reviewing records, and providing counseling and education.

## 2024-11-12 NOTE — PATIENT INSTRUCTIONS
- celebrex with food as needed  - if pain persists, please contact your orthopedic doctor  - no steroids while undergoing immunotherapy

## 2024-11-12 NOTE — ASSESSMENT & PLAN NOTE
- refill celebrex as previously prescribed by Dr. Potter  - explained we cannot prescribe oral steroid while pt is on immunotherapy  - offered PT, declined  - offered muscle relaxer, declined   - I sxs persist, advised pt to reach out to her ortho

## 2024-11-27 ENCOUNTER — PATIENT OUTREACH (OUTPATIENT)
Dept: CASE MANAGEMENT | Facility: CLINIC | Age: 74
End: 2024-11-27
Payer: MEDICARE

## 2024-11-27 RX ORDER — PROCHLORPERAZINE MALEATE 10 MG
10 TABLET ORAL EVERY 6 HOURS PRN
COMMUNITY

## 2024-11-27 RX ORDER — HYDROCHLOROTHIAZIDE 12.5 MG/1
12.5 TABLET ORAL DAILY
COMMUNITY
End: 2025-01-20

## 2024-11-27 RX ORDER — DEXAMETHASONE 2 MG/1
2 TABLET ORAL 2 TIMES DAILY WITH MEALS
COMMUNITY

## 2024-11-27 RX ORDER — OXYCODONE HYDROCHLORIDE 5 MG/1
5 TABLET ORAL EVERY 4 HOURS PRN
COMMUNITY

## 2024-11-27 ASSESSMENT — ENCOUNTER SYMPTOMS
PALPITATIONS: 0
VOMITING: 0
FATIGUE: 1
FEVER: 0
HEADACHES: 0
DIFFICULTY URINATING: 0
DYSURIA: 0
CONSTIPATION: 0
SHORTNESS OF BREATH: 0
LIGHT-HEADEDNESS: 1
SLEEP DISTURBANCE: 0
COUGH: 1
NAUSEA: 0
CHILLS: 0
FREQUENCY: 0
DIZZINESS: 0
APPETITE CHANGE: 1

## 2024-11-27 NOTE — PROGRESS NOTES
NAME: Muriel Franklin    MRN: [id number]    YOB: 1950    Event Review:  Initial TCM Patient Outreach Date: 11/27/24    Assessment completed with: Patient  Patient stated reason for hospitalization: I had horrific back pain in my left side  Discharge Diagnosis: Follicular lymphoma    Patient readmitted in the last 30 days: No  Discharging Facility: [facility]  Date of Last Admission: 11/18/24  Date of Last Discharge: 11/26/24      Patient's perception of their health status since discharge: Improving    HPI:Patient presented to [facility] for severe back pain. She was a direct admit for oncology. Patient has a history of Follicular lymphoma. She was found to have a compression fracture due to the cancer in T2 of the spine.  She was given steriods for pain management and started on radiation therapy in the hospital to be continued as an outpatient.  She was discharged to home once stable.     Spoke with the patient who reported that she is still in pain, but not as bad as it was. She stated that prior it was at 12/10. All medications were reviewed and are in the home.   She has lost 10 lbs and her appetite is decreased. Instructed on small frequent meals, use of supplement shakes and increased protein. Her bowels are moving.  She declined a PCP appointment, but is agreeable to follow up calls. She will go to radiation today.   Instructed on spinal precautions, and signs and symptoms to notify the MD or return to the ER.Will follow up at a later date.      Review of Systems   Constitutional:  Positive for appetite change and fatigue. Negative for chills and fever.   Respiratory:  Positive for cough. Negative for shortness of breath.    Cardiovascular:  Negative for chest pain, palpitations and leg swelling.   Gastrointestinal:  Negative for constipation, nausea and vomiting.   Genitourinary:  Negative for difficulty urinating, dysuria and frequency.   Neurological:   Positive for light-headedness. Negative for dizziness and headaches.   Psychiatric/Behavioral:  Negative for sleep disturbance.        Medication Review:  Medication Review: Yes     Reported by: Patient  Any new medications prescribed at discharge?: Yes  Is the patient having any side effects they believe may be caused by any medication additions or changes?: No  New prescriptions filled?: Yes     Do you have enough of your regularly prescribed medications?: Yes       Medication adherence problem?: No  Was a medication discrepancy indentified?: No       Nursing Interventions: Nurse provided patient education  Reconciled the current and discharge medications: Yes     Acute Pain:  Acute pain: Yes     Acute pain severity: 4  Acute pain timing: constant  Location of acute pain: back    Chronic Pain:  Chronic pain: No      Diet/Nutrition:  Type of Diet: Regular     Home Care Services:  Home Care Interventions: Declined post-discharge     Post-Discharge Durable Medical Equipment::  Durable Medical Equipment: None       Home Management:  Living Arrangement: Spouse  Support System:: Spouse  Type of Residence: [address] [place] house     Any patient reported falls in the last 3 months?: No  Sikhism or spiritual beliefs that impact treatment?: No    Appointment Scheduling:  PCP appointment scheduled: No     Patient Scheduling Dispositions: Patient prefers to see specialist     Follow-Ups:  Relevant Specialist Follow-ups: oncology    Interventions/ Care Coordination:  Interventions/ Care Coordination: Addressed a knowledge deficit  General Education: Respiratory precautions (flu, colds, pneumonia, RSV, COVID-19), Nutrition and hydration instructions     Initiated Care Plan: DENTON          Reviewed signs/symptoms of worsening condition or complication that necessitate a call to the Physician's office.  Educated patient on access to care.  RN phone number given for future care management.    Maggie Escobedo RN  [phone]

## 2024-12-04 ENCOUNTER — PATIENT OUTREACH (OUTPATIENT)
Dept: CASE MANAGEMENT | Facility: CLINIC | Age: 74
End: 2024-12-04
Payer: MEDICARE

## 2024-12-04 NOTE — PROGRESS NOTES
DENTON follow up call made: Spoke with the patient who stated that she is on her 7th radiation therapy and is having some relief. She is eating and bowels are moving.  Instructed on small frequent meals, adequate fluids, fiber and protein. ACM instructed to call into the office for any immediate questions or concerns.  RN will follow up at a later time.    Maggie Escobedo RN, BSN  Ambulatory Care Manager  [phone]

## 2024-12-05 ENCOUNTER — TELEPHONE (OUTPATIENT)
Dept: CASE MANAGEMENT | Facility: CLINIC | Age: 74
End: 2024-12-05
Payer: MEDICARE

## 2024-12-05 ENCOUNTER — PATIENT OUTREACH (OUTPATIENT)
Dept: CASE MANAGEMENT | Facility: CLINIC | Age: 74
End: 2024-12-05
Payer: MEDICARE

## 2024-12-05 NOTE — TELEPHONE ENCOUNTER
Patient left message for Shannon March (I am covering for her this afternoon) requesting a call back.    She reported she is having an increased cough w/ light green thick sputum, worst at night and also new wheezing, again only at night which is making it hard for her to sleep.    No fever or chills, no rhinorrhea or sneezing, she feels well enough to run errands today. She had radiation #8 today and states they listened to her lungs and said they did not hear anything. She denies difficulty breathing.    She was told to take Mucinex for the cough, and Trazodone for sleeping from [place]. She is not getting relief and she is fearful of developing PNA.    She is not able to come in for an appt today but is requesting PCP guidance or additional intervention. Can someone can please follow up with her today?    Delio Archer, JIMBON, RN  Ambulatory Care Manager  [phone]

## 2024-12-05 NOTE — TELEPHONE ENCOUNTER
Returned Muriel's call. She states that she is having productive cough and she feels like she is wheezing. She is taking mucinex and Trazodone for sleep, but they are not helping. Recommended that she go to UC to be assessed. Pt agrees with plan and will [facility].

## 2024-12-05 NOTE — PROGRESS NOTES
Patient left message for Shannon March (this [facility] is covering for her today) requesting a call back.     She reported she is having an increased cough w/ light green thick sputum, worst at night and also new wheezing, again only at night which is making it hard for her to sleep.     No fever or chills, no rhinorrhea or sneezing, she feels well enough to run errands today. She had radiation #8 today and states they listened to her lungs and said they did not hear anything. She denies difficulty breathing.     She was told to take Mucinex for the cough, and Trazodone for sleeping from [place]. She is not getting relief and she is fearful of developing PNA.     She is not able to come in for an appt today but is requesting PCP guidance or additional intervention.     High priority telephone encounter routed to clinical staff and call placed to back line to confirm receipt of message, spoke w/ Vini who will be sure nursing staff reviews message and follows up w/ patient.    Delio Archer, JIMBON, RN  Ambulatory Care Manager  [phone]

## 2024-12-10 ENCOUNTER — PATIENT OUTREACH (OUTPATIENT)
Dept: CASE MANAGEMENT | Facility: CLINIC | Age: 74
End: 2024-12-10
Payer: MEDICARE

## 2024-12-10 NOTE — PROGRESS NOTES
[place] follow up call made: Spoke with the patient who [facility] and is going to start on antibiotic and an inhaler.   Instructed on adequate fluids, cough and deep breathing and signs and symptoms to notify the MD or go to the ER.  She will start her chemo next Monday if all goes well.  Radiation is finished. ACM instructed to call into the office for any immediate questions or concerns.  RN will follow up at a later time.    Maggie Escobedo RN, BSN  Ambulatory Care Manager  [phone]

## 2024-12-18 ENCOUNTER — PATIENT OUTREACH (OUTPATIENT)
Dept: CASE MANAGEMENT | Facility: CLINIC | Age: 74
End: 2024-12-18
Payer: MEDICARE

## 2024-12-18 NOTE — PROGRESS NOTES
[place] follow up call made:  Spoke with the patient who stated that she had her treatment and is doing ok.She has been assisting her  with his up coming surgery for throat cancer, so maybe she hasn't had time to have any issues. She plans to stay at home the rest of the week and get ready for the holiday. ACM instructed to call into the office for any immediate questions or concerns.  RN will follow up at a later time.    Maggie Escobedo RN, BSN  Ambulatory Care Manager  [phone]

## 2024-12-26 ENCOUNTER — PATIENT OUTREACH (OUTPATIENT)
Dept: CASE MANAGEMENT | Facility: CLINIC | Age: 74
End: 2024-12-26
Payer: MEDICARE

## 2024-12-26 NOTE — PROGRESS NOTES
[place] follow up call made: Spoke with the patient who stated she is doing ok. She is nauseous and using her antiemetics. She feels that her appetite is returning. She will have her next treatment on 1/6. ACM instructed to call into the office for any immediate questions or concerns.  RN will follow up at a later time.    Maggie Escobedo RN, BSN  Ambulatory Care Manager  [phone]

## 2025-01-08 ENCOUNTER — PATIENT OUTREACH (OUTPATIENT)
Dept: CASE MANAGEMENT | Facility: CLINIC | Age: 75
End: 2025-01-08
Payer: MEDICARE

## 2025-01-08 NOTE — PROGRESS NOTES
[facility] follow up call made: No answer. Left message with return call information.  If no return call, will follow up at a later date.     Return call received. Called back.  Spoke with the patient who stated that she had her second treatment and that she is exhausted. She is not wanting to eat or drink.  Encouraged fluids as much as possible, small sips and if unable to tolerate contact the oncology office to maybe get some fluids.      Maggie Escobedo RN, BSN  Ambulatory Care Manager  [phone]

## 2025-01-20 ENCOUNTER — OFFICE VISIT (OUTPATIENT)
Dept: PRIMARY CARE | Facility: CLINIC | Age: 75
End: 2025-01-20
Payer: MEDICARE

## 2025-01-20 VITALS
SYSTOLIC BLOOD PRESSURE: 136 MMHG | HEIGHT: 61 IN | TEMPERATURE: 98.1 F | BODY MASS INDEX: 24.05 KG/M2 | OXYGEN SATURATION: 99 % | DIASTOLIC BLOOD PRESSURE: 88 MMHG | WEIGHT: 127.4 LBS | HEART RATE: 84 BPM | RESPIRATION RATE: 18 BRPM

## 2025-01-20 DIAGNOSIS — E78.00 PURE HYPERCHOLESTEROLEMIA: ICD-10-CM

## 2025-01-20 DIAGNOSIS — J47.9 BRONCHIECTASIS, UNCOMPLICATED (CMS/HCC): ICD-10-CM

## 2025-01-20 DIAGNOSIS — I10 PRIMARY HYPERTENSION: ICD-10-CM

## 2025-01-20 DIAGNOSIS — D80.1 HYPOGAMMAGLOBULINEMIA (CMS/HCC): ICD-10-CM

## 2025-01-20 DIAGNOSIS — C82.19 FOLLICULAR LYMPHOMA GRADE II, EXTRANODAL AND SOLID ORGAN SITES: ICD-10-CM

## 2025-01-20 DIAGNOSIS — E78.2 MIXED HYPERLIPIDEMIA: ICD-10-CM

## 2025-01-20 DIAGNOSIS — Z00.00 MEDICARE ANNUAL WELLNESS VISIT, SUBSEQUENT: Primary | ICD-10-CM

## 2025-01-20 DIAGNOSIS — E03.9 ACQUIRED HYPOTHYROIDISM: ICD-10-CM

## 2025-01-20 DIAGNOSIS — I25.10 CORONARY ARTERIOSCLEROSIS IN NATIVE ARTERY: ICD-10-CM

## 2025-01-20 DIAGNOSIS — D32.9 MENINGIOMA (CMS/HCC): ICD-10-CM

## 2025-01-20 PROBLEM — J01.00 ACUTE NON-RECURRENT MAXILLARY SINUSITIS: Status: RESOLVED | Noted: 2024-06-21 | Resolved: 2025-01-20

## 2025-01-20 PROBLEM — J32.4 CHRONIC PANSINUSITIS: Status: RESOLVED | Noted: 2024-08-05 | Resolved: 2025-01-20

## 2025-01-20 PROBLEM — J06.9 UPPER RESPIRATORY TRACT INFECTION: Status: RESOLVED | Noted: 2023-11-28 | Resolved: 2025-01-20

## 2025-01-20 PROBLEM — S52.572D OTHER INTRAARTICULAR FRACTURE OF LOWER END OF LEFT RADIUS, SUBSEQUENT ENCOUNTER FOR CLOSED FRACTURE WITH ROUTINE HEALING: Status: RESOLVED | Noted: 2024-04-09 | Resolved: 2025-01-20

## 2025-01-20 PROCEDURE — G0439 PPPS, SUBSEQ VISIT: HCPCS | Performed by: FAMILY MEDICINE

## 2025-01-20 PROCEDURE — 1123F ACP DISCUSS/DSCN MKR DOCD: CPT | Performed by: FAMILY MEDICINE

## 2025-01-20 RX ORDER — LORAZEPAM 1 MG/1
1 TABLET ORAL
COMMUNITY
Start: 2024-12-16

## 2025-01-20 ASSESSMENT — MINI COG
COMPLETED: YES
TOTAL SCORE: 5

## 2025-01-20 NOTE — PATIENT INSTRUCTIONS
Your Personalized Prevention Plan Services (PPPS)    Preventive Services Checklist (Assumes Average Risk Unless Otherwise Noted):    Health Maintenance Topics with due status: Overdue       Topic Date Due    Depression Screening Never done    Zoster Vaccine 06/22/2021    DEXA Scan 09/27/2023    COVID-19 Vaccine 09/01/2024    Medicare Annual Wellness Visit 12/11/2024     Health Maintenance Topics with due status: Not Due       Topic Last Completion Date    DTaP, Tdap, and Td Vaccines 02/01/2016    Colorectal Cancer Screening 10/08/2021    Falls Risk Screening 01/20/2025     Health Maintenance Topics with due status: Completed       Topic Last Completion Date    Hepatitis C Screening 06/20/2019    Pneumococcal (65 years and older) 08/26/2019    RSV Vaccine 11/03/2023    Influenza Vaccine 10/09/2024     Health Maintenance Topics with due status: Aged Out       Topic Date Due    Meningococcal ACWY Aged Out    RSV <20 months Aged Out    HIB Vaccines Aged Out    Hepatitis B Vaccines Aged Out    IPV Vaccines Aged Out    Meningococcal B Aged Out    HPV Vaccines Aged Out       You May Be Eligible for These Additional Preventive Services   (Assumes Average Risk Unless Otherwise Noted)  Diabetes Screening Any 1 risk factor: hypertension, dyslipidemia, obesity, high glucose; or Any 2 risk factors: >=64yo, overweight, family history diabetes (covered every 6 months)   Hepatitis C Screening Any 1 risk factor: 1) blood transfusion before 1992,   2) current or past injection drug use (annually for high risk; if born between 4771-9613, see above for status).   Vaccine: Hepatitis B As necessary if at-risk: hemophilia, ESRD, diabetes, living with individual infected with hep B, healthcare worker with frequent contact with blood/bodily fluids (series covered once)   Sexually Transmitted Diseases (STDs) As necessary chlamydia, gonorrhea, syphilis, hepatitis B (covered annually)  HIV if any 1 risk factor present: 1)  <16yo or >64yo and at increased risk or 2) 15-64yo and ask for it (covered annually)   Lung Cancer Screening Low dose chest CT if all three risk factors: 1) 50-76yo, 2) smoker or quit within last 15y, 3) >=20 pack years (covered annually).  No results found for this or any previous visit.       Cholesterol Screening Both risk factors: 1) >=21yo and 2)  increased risk coronary artery disease (covered every 5 years).   Breast Cancer Screening Covered once 35-38yo, annually >=39yo (if >=51yo, see above for status).       Health Risk Factors with Personalized Education:  ----------------------------------------------------------------------------------------------------------------------  Stress management:  Understanding Your Stress  Think about how you know when you’re stressed.  Think about how your thoughts or behaviors are different when you’re stressed.  Think about what triggers stress for you.  Think about how you handle stress, and whether you’re making unhealthy choices in response to stress (like smoking, drinking alcohol or overeating).  Managing Stress  Take a break from the stressful situation.  Reduce your stress levels by exercising, talking with family/friends, ensuring adequate sleep.  Consider meditation or yoga.  Make sure you plan time to do things you enjoy.  Let your PCP know if you’re having problems limiting your stress.  ----------------------------------------------------------------------------------------------------------------------  Controlling Your Blood Pressure  Maintain a normal weight (body mass index between 18.5 and 24.9).  Eat more fruit, vegetables and low-fat dairy.  Eat less saturated fat and total fat.  Lower your sodium (salt) intake.  Try to stay under 1500 mg per day, but if you cannot get your intake to be that low, at least lower it by 1000 mg.  Stay active.  Try to get at least 90 to 150 minutes of exercise per week.  Try brisk walking, swimming, bicycling or  dancing.  Limit alcohol intake.  When you do consume alcohol, drink no more than 1 drink per day.  If you have been prescribed medication, take it regularly and exactly as prescribed.  Let your PCP know if you have any problems or questions about your medication.  Check your blood pressure at home or at the store.  Write down your readings and share them with your PCP  ----------------------------------------------------------------------------------------------------------------------  Controlling Your Cholesterol  Reduce the amount of saturated and trans fat in your diet.  Limit intake of red meat.  Consume only low-fat or non-fat/skim dairy.  Limit fried food.  Cook with vegetable oils.  Reduce your intake of sugary foods, sugary drinks and alcohol.  Eat a diet high in fruit, vegetables and whole grains.  Get protein from fish, poultry and a small portion of nuts.  Stay active.  Try to get at least 90 to 150 minutes of exercise per week.  Try brisk walking, swimming, bicycling or dancing.  Maintain a healthy weight by balancing your diet and exercise.  If you have been prescribed medication, take it regularly and exactly as prescribed. Let your PCP know if you have any problems or questions about your medication.  It’s important to know your cholesterol numbers.  When recommended by your PCP, get the cholesterol blood test.  ----------------------------------------------------------------------------------------------------------------------  Controlling Your Osteopenia, Strengthening Your Bones  Try to get at least 90 to 150 minutes of weight-bearing exercise per week.  Ensure intake of at least 1200mg of calcium per day.  Eat foods high in calcium like milk and other dairy, green vegetables, fruit, canned fish with soft and edible bones, nuts, calcium-set tofu.  Some foods are calcium-fortified, like bread, cereal, fruit juices and mineral water.  Help your body make vitamin D by getting 10-15 minutes per day  of sunlight.    Ensure intake of at least 600IU of vitamin D per day.  Eat foods high in vitamin D like oily fish (salmon, sardines, mackerel) and eggs.  Some foods are fortified with vitamin D, like dairy and cereals.  Avoid high amounts of caffeine and salt, since they can cause the body to loose calcium.  Limit alcohol intake, since it is associated with weaker bones and is associated with falls and fractures.  Limit intake of fizzy drinks.  If you have been prescribed medication, take it regularly and exactly as prescribed.  Let your PCP know if you have any problems or questions about your medication  ----------------------------------------------------------------------------------------------------------------------  Reducing Your Risk of Falls  Tell your PCP if any of your medications make you feel tired, dizzy, lightheaded or off-balance.  Maintain coordination, flexibility and balance by ensuring regular physical activity.  Limit alcohol intake to 1 drink per day.  Consider avoiding all alcohol intake.  Ensure good vision.  Visit an ophthalmologist or optometrist regularly for vision screening or to make sure your glasses / contact lens prescription is correct.  If you need glasses or contacts, wear them.  When you get new glasses or contacts, take time to get used to them.  Do not wear sunglasses or tinted lenses when indoors.  Ensure good hearing.  Have your hearing checked if you are having trouble hearing, or family and friends think you cannot hear them.  If you need a hearing aid, be sure it fits well and wear it.  Get enough rest.  Ensure about 7-9 hours of sleep every day.  Get up slowly from your bed or chairs.  Do not start walking until you are sure you feel steady.  Wear non-skid, rubber-soled, low-heeled shoes.  Do not walk in socks, or in shoes and slippers with smooth soles.  If your PCP or therapist recommends using a cane or walker, use it regularly.  Make your home safer.  Increase  lighting throughout the house, especially at the top and bottom of stairs.  Ensure lighting is easily turned on when getting up in the middle of the night.  Make sure there are two secure rails on all stairs.  Install grab bars in the bathtub / shower and near the toilet.  Consider using a shower chair and / or a hand-held shower.  Spread sand or salt on icy surfaces.  Beware of wet surfaces, which can be icy.  Tell your PCP if you have fallen.

## 2025-01-20 NOTE — PROGRESS NOTES
Subjective     Muriel Franklin is a 74 y.o. female who presents for a subsequent annual wellness visit.     Patient Care Team:  Sidra Potter MD as PCP - General (Family Medicine)  Jarad Hendrickson MD as Referring Physician  Gabi Richardson MD as Obstetrician (Obstetrics and Gynecology)  Berkley Da Silva MD as Medical Oncologist (Hematology and Oncology)  Maggie Escobedo, RN as Care Manager (Care Management)    PT 74 with hx fo follicular lymphoma, hypertension hyperlipidemia, hypothyroidism and hx of TIA     Follicular lymphoma stage IV double refractory  Dr Saulo Guadalupe  rad onc completed 10 treatments radiation therapy   Chemo 3rd cycle of treatment     She was hospitalized from 11/18-11/26 due to worsening lower back pain. MRI spine showed near complete replacement of the T9 vertebral body with a pathologic compression fracture resulting in up to 25% vertebral body height loss and retropulsion/extraosseous extension causing mild mass effect upon the thecal sac and cord. A repeat PET scan on 11/19 showed improvement in some sites of disease in the bones and lymph nodes compared to 7/22/2024 FDG PET/CT, however there is overall progression of lymphoma with multiple new mateusz and osseous lesions, including new mediastinal lymph nodes and new thoracic spine lesions. A BMBx on 11/20 showed hypercellular marrow showing trilineage hematopoiesis and involvement by B-cell lymphoma (20-25% of total cellularity) compatible with   involvement by patient's known lymphoma. FISH was + for BCL-2. NGS showed a TP53 mutation. T9 vertebral bx showed a B-cell infiltrate and flow cytometry showing CD10+ B-cell clone (14.7% of total events) compatible with CD10+ B-cell lymphoma. She was started on radiation therapy on 11/26 due to concern for mass effect upon the cord and tolerated it well. Pt was discharged on 11/26.     She is tolerating side effect fatigue and nausea but disturbed about hair loss   She  is getting a human hair wig soon     [place]    Per onc plan:  Follicular Lymphoma  - Repeat PET/CT in July 2024 shows progressive disease.   - CD3 count too low to consider CAR as of 7/18/24   - Stopped Mosunetuzumab treatment with PET/CT scan after C2 on 12/3 with epidural compression   - S/p RT to the T9 epidural in 10 fractions completed 12/9  - Started RCHP (omitting VCR due to mild cord compression) on 12/16/24  - Prednisone 100 mg days 2-5  - Allopurinol and acyclovir ppx  - Zofran and Compazine for N/V  - Continue with R-CHOP with C2 on 1/6 (VCR half dose).        Ent did rx  azelastine /fluticason    Takes miralax daily and occasional senna then diarrhea   Comprehensive Medical and Social History  Patient Active Problem List   Diagnosis    Coronary arteriosclerosis in native artery    Follicular lymphoma grade II, extranodal and solid organ sites (CMS/HCC)    Gastro-esophageal reflux disease without esophagitis    Hypertension    Primary localized osteoarthritis of pelvic region and thigh    Chronic left-sided low back pain without sciatica    Meningioma (CMS/HCC)    Migraine with aura    Mixed hyperlipidemia    TIA (transient ischemic attack)    Medicare annual wellness visit, subsequent    Osteopenia of multiple sites    Chronic constipation    Acute left-sided thoracic back pain    Hypokalemia    Hypomagnesemia    Hyperlipidemia    Hypothyroidism    Immunocompromised state (CMS/HCC)    Hypogammaglobulinemia (CMS/HCC)    Left wrist pain    Groin pain, left    Bronchiectasis, uncomplicated (CMS/HCC)     Past Medical History:   Diagnosis Date    COVID 08/31/2022    Hypertension     Lipid disorder     Lymphoma, non-Hodgkin's (CMS/HCC)     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) 04/03/2019    Non-Hodgkin lymphoma (CMS/HCC)     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     Past Surgical History   Procedure Laterality Date    Eye surgery       Foot surgery      Hip surgery      Knee surgery       Allergies   Allergen Reactions    Isopropyl Alcohol      Other reaction(s): Blisters     Sulfa (Sulfonamide Antibiotics)      Other reaction(s): thought to cause TIA  Other reaction(s): foot soreness and blistering of left foot after surgery  Other reaction(s): hematoma and blistering after surgery    Chlorhexidine Rash     Current Outpatient Medications   Medication Sig Dispense Refill    acyclovir (ZOVIRAX) 400 mg tablet Take 400 mg by mouth every 12 (twelve) hours.      allopurinoL (ZYLOPRIM) 300 mg tablet       cholecalciferol, vitamin D3, 1,000 unit capsule Take 1,000 Units by mouth daily.      dexAMETHasone (DECADRON) 2 mg tablet Take 2 mg by mouth 2 (two) times a day with meals. 1 every 12 hours x 3 days   1 daily x 3days      levothyroxine (SYNTHROID) 50 mcg tablet Take 1 tablet (50 mcg total) by mouth daily. 90 tablet 1    LORazepam (ATIVAN) 1 mg tablet Take 1 mg by mouth.      MAGNESIUM ORAL Take 1 tablet by mouth daily.      MOSUNETUZUMAB-AXGB IV Infuse into a venous catheter.      multivit-iron-FA-calcium-mins (WOMEN'S DAILY FORMULA) 18 mg iron-400 mcg-500 mg Ca tablet Womens Daily Formula      oxyCODONE (ROXICODONE) 5 mg immediate release tablet Take 5 mg by mouth every 4 (four) hours as needed.      prochlorperazine (COMPAZINE) 10 mg tablet Take 10 mg by mouth every 6 (six) hours as needed for nausea or vomiting.      rosuvastatin (CRESTOR) 40 mg tablet Take 40 mg by mouth nightly.       No current facility-administered medications for this visit.     Social History     Tobacco Use    Smoking status: Never    Smokeless tobacco: Never   Vaping Use    Vaping status: Never Used   Substance Use Topics    Alcohol use: Yes     Alcohol/week: 7.0 standard drinks of alcohol     Types: 7 Glasses of wine per week    Drug use: No     Family History   Problem Relation Name Age of Onset    Arthritis Biological Mother      Heart attack Biological Father      Heart  "disease Biological Brother         Objective   Vitals  Vitals:    01/20/25 0921   BP: 136/88   BP Location: Right upper arm   Patient Position: Sitting   Pulse: 84   Resp: 18   Temp: 36.7 °C (98.1 °F)   TempSrc: Temporal   SpO2: 99%   Weight: 57.8 kg (127 lb 6.4 oz)   Height: 1.537 m (5' 0.5\")     Body mass index is 24.47 kg/m².  Physical Exam  Constitutional:       Appearance: She is well-developed and normal weight.   HENT:      Head: Normocephalic and atraumatic.      Right Ear: Tympanic membrane, ear canal and external ear normal.      Left Ear: Tympanic membrane, ear canal and external ear normal.      Nose: Nose normal.   Neck:      Thyroid: No thyromegaly.   Cardiovascular:      Rate and Rhythm: Normal rate and regular rhythm.      Heart sounds: Normal heart sounds. No murmur heard.     No friction rub. No gallop.   Pulmonary:      Effort: Pulmonary effort is normal. No respiratory distress.      Breath sounds: Normal breath sounds. No wheezing or rales.   Musculoskeletal:      Cervical back: Normal range of motion and neck supple.      Right lower leg: No edema.      Left lower leg: No edema.   Lymphadenopathy:      Cervical: No cervical adenopathy.   Neurological:      Mental Status: She is alert and oriented to person, place, and time.   Psychiatric:         Mood and Affect: Mood normal.         Behavior: Behavior normal.         Thought Content: Thought content normal.         Judgment: Judgment normal.            Advance care plan discussed and documented in the medical record   Advanced Care Plan  Does patient have advance directive?: Yes       Patient has Advance Directive: Advance Directive is NOT in chart, requested to bring in   Does patient have current OOH DNR form?: Yes       Patient has current OOH DNR form: OOH DNR is NOT in chart, requested to bring in   Does patient have current POLST?: Yes       Patient has current POLST: POLST is NOT in chart, requested to bring in     PHQ  Will the patient " answer the depression questions?: No (currently going through chemo)                                                         Mini Cog  Completed: Yes  Score: 5  Result: Negative      Get Up and Go  Result: Pass    STEADI Falls Risk  One or more falls in the last year: No                                                       Falls screen completed: Yes     Hearing and Vision Screening  No results found.  See HRA for relevant hearing screening response.    Diet and Exercise disc she is walking getting exercise loves [employer]             Assessment/Plan   Diagnoses and all orders for this visit:    Medicare annual wellness visit, subsequent (Primary)    Follicular lymphoma grade II, extranodal and solid organ sites (CMS/HCC)    Bronchiectasis, uncomplicated (CMS/HCC)    Acquired hypothyroidism  -     T4, free; Future  -     TSH; Future    Coronary arteriosclerosis in native artery    Hypogammaglobulinemia (CMS/HCC)    Primary hypertension    Pure hypercholesterolemia    Meningioma (CMS/HCC)    Mixed hyperlipidemia        Has not been taken hct was rx on dc from hosp in nov  has not been taking bp has been in 117-120's /70-80  Disc labs   Disc recheck thyroid   Disc plan per oncology at length   Spirits good    also has a neck cancer was resected supportive       See Patient Instructions (the written plan) which was given to the patient for PPPS and health risk factors with interventions.

## 2025-01-22 ENCOUNTER — PATIENT OUTREACH (OUTPATIENT)
Dept: CASE MANAGEMENT | Facility: CLINIC | Age: 75
End: 2025-01-22
Payer: MEDICARE

## 2025-01-22 NOTE — PROGRESS NOTES
M follow up call made: Spoke with the patient who stated that she is hanging in there. This is her good week as she will have another treatment next week.  No other issues or concerns. ACM instructed to call into the office for any immediate questions or concerns.  RN will follow up at a later time.    Maggie Escobedo RN, BSN  Ambulatory Care Manager  [phone]

## 2025-02-02 DIAGNOSIS — E03.9 HYPOTHYROIDISM, UNSPECIFIED TYPE: ICD-10-CM

## 2025-02-03 RX ORDER — LEVOTHYROXINE SODIUM 50 UG/1
50 TABLET ORAL DAILY
Qty: 90 TABLET | Refills: 1 | Status: SHIPPED | OUTPATIENT
Start: 2025-02-03

## 2025-02-05 ENCOUNTER — PATIENT OUTREACH (OUTPATIENT)
Dept: CASE MANAGEMENT | Facility: CLINIC | Age: 75
End: 2025-02-05
Payer: MEDICARE

## 2025-02-05 NOTE — PROGRESS NOTES
"[facility] follow up call made: Spoke with the patient who stated that she is doing ok. She is tolerating the treatments and today she felt a little \"crummy\" but otherwise is hanging in. ACM instructed to call into the office for any immediate questions or concerns.  RN will follow up at a later time.    Maggie Escobedo RN, BSN  Ambulatory Care Manager  [phone]    "

## 2025-02-19 ENCOUNTER — PATIENT OUTREACH (OUTPATIENT)
Dept: CASE MANAGEMENT | Facility: CLINIC | Age: 75
End: 2025-02-19
Payer: MEDICARE

## 2025-02-19 NOTE — PROGRESS NOTES
[facility] follow up call made: No answer. Left message with return call information.  If no return call, will follow up at a later date.     Maggie Escobedo RN, BSN  Ambulatory Care Manager  [phone]

## 2025-02-20 ENCOUNTER — PATIENT OUTREACH (OUTPATIENT)
Dept: CASE MANAGEMENT | Facility: CLINIC | Age: 75
End: 2025-02-20
Payer: MEDICARE

## 2025-02-20 NOTE — PROGRESS NOTES
M follow up call : received a return voice mail requesting a return call.     Spoke with the patient who stated that she had a PET scan that was having great results. She was taking off the current treatment and is now having radiation to a new area in the spine. 5 dose.  She will then start a new chemo regime. She stated that she is feeling pretty good. She has plans to go to [place] on Saturday and will start the treatments once she has returned.ACM instructed to call into the office for any immediate questions or concerns.  RN will follow up at a later time.    Maggie Escobedo RN, BSN  Ambulatory Care Manager  [phone]

## 2025-03-12 ENCOUNTER — PATIENT OUTREACH (OUTPATIENT)
Dept: CASE MANAGEMENT | Facility: CLINIC | Age: 75
End: 2025-03-12
Payer: MEDICARE

## 2025-03-12 NOTE — PROGRESS NOTES
[facility] follow up call made: Spoke with the patient who reported that she is doing ok. She completed her radiation and is awaiting a call today from the oncologist to determine the plan going forward.   She reported that she is taking a steroid for the back pain which had been helping, but today not so much. ACM instructed to call into the office for any immediate questions or concerns.  RN will follow up at a later time.    Maggie Escobedo RN, BSN  Ambulatory Care Manager  [phone]

## 2025-04-03 ENCOUNTER — PATIENT OUTREACH (OUTPATIENT)
Dept: CASE MANAGEMENT | Facility: CLINIC | Age: 75
End: 2025-04-03
Payer: MEDICARE

## 2025-04-03 NOTE — PROGRESS NOTES
LCM follow up call made:   Spoke with the patient who stated that she is doing ok. She started a new treatment on Monday so she was not feeling too well the last couple of days.  She does not have much of an appetite, but is aware that she needs to eat. Instructed on small frequent meals, and adequate fluids. ACM instructed to call into the office for any immediate questions or concerns.  RN will follow up at a later time.    Maggie Escobedo RN, BSN  Ambulatory Care Manager  [phone]

## 2025-04-24 ENCOUNTER — PATIENT OUTREACH (OUTPATIENT)
Dept: CASE MANAGEMENT | Facility: CLINIC | Age: 75
End: 2025-04-24
Payer: MEDICARE

## 2025-04-24 NOTE — PROGRESS NOTES
[facility] follow up call made: Spoke with the patient who reported that she is doing ok. She reported that she is tolerating the treatment ok. She will go next week and if her counts are low they will skip the neck treatment. She is complaining of some shortness of breath and abdominal bloating. So she will be scheduled to have a CT scan done. She otherwise stated that  she feels pretty well. No fevers or chills. ACM instructed to call into the office for any immediate questions or concerns.  RN will follow up at a later time.    Maggie Escobedo RN, BSN  Ambulatory Care Manager  [phone]

## 2025-04-29 LAB
T4 FREE SERPL-MCNC: 1.1 NG/DL (ref 0.8–1.8)
TSH SERPL-ACNC: 2.5 MIU/L (ref 0.4–4.5)

## 2025-04-30 ENCOUNTER — RESULTS FOLLOW-UP (OUTPATIENT)
Dept: PRIMARY CARE | Facility: CLINIC | Age: 75
End: 2025-04-30

## 2025-05-08 ENCOUNTER — PATIENT OUTREACH (OUTPATIENT)
Dept: CASE MANAGEMENT | Facility: CLINIC | Age: 75
End: 2025-05-08
Payer: MEDICARE

## 2025-05-22 ENCOUNTER — ANESTHESIA (OUTPATIENT)
Dept: ENDOSCOPY | Facility: HOSPITAL | Age: 75
End: 2025-05-22
Payer: MEDICARE

## 2025-05-22 ENCOUNTER — HOSPITAL ENCOUNTER (OUTPATIENT)
Facility: HOSPITAL | Age: 75
Discharge: HOME | End: 2025-05-22
Attending: INTERNAL MEDICINE | Admitting: INTERNAL MEDICINE
Payer: MEDICARE

## 2025-05-22 ENCOUNTER — ANESTHESIA EVENT (OUTPATIENT)
Dept: ENDOSCOPY | Facility: HOSPITAL | Age: 75
End: 2025-05-22
Payer: MEDICARE

## 2025-05-22 VITALS
HEIGHT: 59 IN | DIASTOLIC BLOOD PRESSURE: 66 MMHG | HEART RATE: 59 BPM | TEMPERATURE: 97.9 F | BODY MASS INDEX: 29.64 KG/M2 | RESPIRATION RATE: 15 BRPM | SYSTOLIC BLOOD PRESSURE: 106 MMHG | WEIGHT: 147 LBS | OXYGEN SATURATION: 96 %

## 2025-05-22 PROCEDURE — 25800000 HC PHARMACY IV SOLUTIONS: Performed by: INTERNAL MEDICINE

## 2025-05-22 PROCEDURE — 0DJD8ZZ INSPECTION OF LOWER INTESTINAL TRACT, VIA NATURAL OR ARTIFICIAL OPENING ENDOSCOPIC: ICD-10-PCS | Performed by: INTERNAL MEDICINE

## 2025-05-22 PROCEDURE — 75000014 HC COLONSCOPY DIAGNOSTIC: Performed by: INTERNAL MEDICINE

## 2025-05-22 PROCEDURE — 63600000 HC DRUGS/DETAIL CODE: Mod: JW | Performed by: STUDENT IN AN ORGANIZED HEALTH CARE EDUCATION/TRAINING PROGRAM

## 2025-05-22 PROCEDURE — 25000000 HC PHARMACY GENERAL: Performed by: STUDENT IN AN ORGANIZED HEALTH CARE EDUCATION/TRAINING PROGRAM

## 2025-05-22 PROCEDURE — 37000002 HC ANESTHESIA MAC: Performed by: INTERNAL MEDICINE

## 2025-05-22 PROCEDURE — 71000011 HC PACU PHASE 1 EA ADDL MIN: Performed by: INTERNAL MEDICINE

## 2025-05-22 PROCEDURE — 71000001 HC PACU PHASE 1 INITIAL 30MIN: Performed by: INTERNAL MEDICINE

## 2025-05-22 RX ORDER — PROPOFOL 10 MG/ML
INJECTION, EMULSION INTRAVENOUS CONTINUOUS PRN
Status: DISCONTINUED | OUTPATIENT
Start: 2025-05-22 | End: 2025-05-22 | Stop reason: SURG

## 2025-05-22 RX ORDER — PROPOFOL 200MG/20ML
SYRINGE (ML) INTRAVENOUS AS NEEDED
Status: DISCONTINUED | OUTPATIENT
Start: 2025-05-22 | End: 2025-05-22 | Stop reason: SURG

## 2025-05-22 RX ORDER — LENALIDOMIDE 20 MG/1
CAPSULE ORAL
COMMUNITY
Start: 2025-04-29

## 2025-05-22 RX ORDER — ASPIRIN 81 MG/1
81 TABLET ORAL DAILY
COMMUNITY

## 2025-05-22 RX ORDER — SODIUM CHLORIDE 9 MG/ML
INJECTION, SOLUTION INTRAVENOUS CONTINUOUS
Status: DISCONTINUED | OUTPATIENT
Start: 2025-05-22 | End: 2025-05-22 | Stop reason: HOSPADM

## 2025-05-22 RX ORDER — LIDOCAINE HYDROCHLORIDE 10 MG/ML
INJECTION, SOLUTION EPIDURAL; INFILTRATION; INTRACAUDAL; PERINEURAL AS NEEDED
Status: DISCONTINUED | OUTPATIENT
Start: 2025-05-22 | End: 2025-05-22 | Stop reason: SURG

## 2025-05-22 RX ADMIN — SODIUM CHLORIDE: 9 INJECTION, SOLUTION INTRAVENOUS at 07:38

## 2025-05-22 RX ADMIN — PROPOFOL 80 MCG/KG/MIN: 10 INJECTION, EMULSION INTRAVENOUS at 08:33

## 2025-05-22 RX ADMIN — LIDOCAINE HYDROCHLORIDE 6 ML: 10 INJECTION, SOLUTION EPIDURAL; INFILTRATION; INTRACAUDAL; PERINEURAL at 08:33

## 2025-05-22 RX ADMIN — PROPOFOL 80 MG: 10 INJECTION, EMULSION INTRAVENOUS at 08:33

## 2025-05-22 NOTE — H&P
GI H&P Note          Subjective   Muriel Franklin is a 75 y.o. 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    Non-Hodgkin lymphoma (CMS/HCC)     Secondary malignant peritoneal deposit (CMS/HCC) 2019     Past Surgical History   Procedure Laterality Date    Bunionectomy Bilateral      section      Eye surgery      Foot surgery      Hip surgery Bilateral     replacement    Knee surgery Bilateral     Replacements     Allergies   Allergen Reactions    Isopropyl Alcohol      Other reaction(s): Blisters     Sulfa (Sulfonamide Antibiotics)      Other reaction(s): thought to cause TIA  Other reaction(s): foot soreness and blistering of left foot after surgery  Other reaction(s): hematoma and blistering after surgery    Chlorhexidine Rash       Home Medications:   sodium chloride 0.9 % infusion   intravenous Continuous      lidocaine PF (XYLOCAINE) 10 mg/mL (1 %) injection   intravenous PRN    propofoL (DIPRIVAN) 10 mg/mL continuous infusion   intravenous Continuous PRN    propofol (DIPRIVAN) IV bolus injection   intravenous PRN     acyclovir (ZOVIRAX) 400 mg tablet   Take 400 mg by mouth every 12 (twelve) hours., Starting 2024, Historical Med  No Decision  allopurinoL (ZYLOPRIM) 300 mg tablet   Historical Med  No Decision  aspirin 81 mg enteric coated tablet   Take 81 mg by mouth daily., Historical Med  Taken within 24 hours of admission?No, Not Taken   No Decision  cholecalciferol, vitamin D3, 1,000 unit capsule   Take 1,000 Units by mouth daily., Historical Med  No Decision  dexAMETHasone (DECADRON) 2 mg tablet   Take 2 mg by mouth 2 (two) times a day with meals. 1 every 12 hours x 3 days 1 daily x 3days, Historical Med  No Decision  lenalidomide 20 mg  "capsule   Historical Med  No Decision  levothyroxine (SYNTHROID) 50 mcg tablet   TAKE 1 TABLET BY MOUTH EVERY DAY, Starting Mon 2/3/2025, Normal  No Decision  LORazepam (ATIVAN) 1 mg tablet   Take 1 mg by mouth., Starting Mon 12/16/2024, Historical Med  No Decision  MAGNESIUM ORAL   Take 1 tablet by mouth daily., Historical Med  No Decision  MOSUNETUZUMAB-AXGB IV   Infuse into a venous catheter., Historical Med  No Decision  multivit-iron-FA-calcium-mins (WOMEN'S DAILY FORMULA) 18 mg iron-400 mcg-500 mg Ca tablet   Womens Daily Formula, Historical Med  No Decision  oxyCODONE (ROXICODONE) 5 mg immediate release tablet   Take 5 mg by mouth every 4 (four) hours as needed., Historical Med  No Decision  prochlorperazine (COMPAZINE) 10 mg tablet   Take 10 mg by mouth every 6 (six) hours as needed for nausea or vomiting., Historical Med  No Decision  rosuvastatin (CRESTOR) 40 mg tablet   Take 40 mg by mouth nightly., Historical Med      Physical Exam    Physical Exam  Visit Vitals  BP (!) 153/67   Pulse 68   Temp 36.8 °C (98.2 °F) (Skin)   Resp 18   Ht 1.499 m (4' 11\")   Wt 66.7 kg (147 lb)   SpO2 95%   BMI 29.69 kg/m²       General appearance: no distress  Head: normocephalic  Lungs: clear to auscultation anteriorly   Heart: regular rate   Abdomen: soft, non-tender; bowel sounds normal; no masses, no organomegaly  Neurologic: awake and alert and oriented        Assessment     For colonoscopy           Clint Kirkpatrick MD  5/22/2025         "

## 2025-05-22 NOTE — OP NOTE
_______________________________________________________________________________  Patient Name: Muriel Franklin          Procedure Date: 5/22/2025 8:13 AM  MRN: [id number]                     Account Number: [id number]  YOB: 1950              Age: 75  Gender: Female                        Note Status: Finalized  Attending MD: COLETTE HULL MD~KURTIS,  _______________________________________________________________________________  Procedure:             Colonoscopy  Indications:           Last colonoscopy: 2021, Abdominal pain in the left  lower quadrant, Change in bowel habits  Providers:             COLETTE HULL MD~KURTIS (Doctor)  Referring MD:          JUSTINE JOINER MD  Requesting Provider:  Medicines:             Monitored Anesthesia Care  Complications:         No immediate complications.  _______________________________________________________________________________  Procedure:             After I obtained informed consent, the scope was  passed under direct vision. Throughout the procedure,  the patient's blood pressure, pulse, and oxygen  saturations were monitored continuously. The  Colonoscope was introduced through the anus and  advanced to the terminal ileum, with identification of  the appendiceal orifice and IC valve. The colonoscopy  was performed without difficulty. The patient  tolerated the procedure well. The quality of the bowel  preparation was good. The ileocecal valve, appendiceal  orifice, and rectum were photographed.  Estimated Blood Loss:  Estimated blood loss: none.  Findings:  The perianal and digital rectal examinations were normal.  Retroflexion in the ascending colon was performed and showed no  abnormalities  The terminal ileum appeared normal.  Multiple small-mouthed diverticula were found in the sigmoid colon and  descending colon.  Non-bleeding internal hemorrhoids were found during retroflexion. The  hemorrhoids were small.  The exam was  otherwise normal throughout the examined colon.  Impression:            - The examined portion of the ileum was normal.  - Diverticulosis in the sigmoid colon and in the  descending colon.  - Non-bleeding internal hemorrhoids.  - No specimens collected.  Recommendation:        - Patient has a contact number available for  emergencies. The signs and symptoms of potential  delayed complications were discussed with the patient.  Return to normal activities tomorrow. Written  discharge instructions were provided to the patient.  - Resume previous diet.  - Continue present medications.  - Repeat colonoscopy is not recommended for screening  purposes.  - Return to GI office at appointment to be scheduled.  Procedure Code(s):     --- Professional ---  53848, Colonoscopy, flexible; diagnostic, including  collection of specimen(s) by brushing or washing, when  performed (separate procedure)  Diagnosis Code(s):     --- Professional ---  K64.8, Other hemorrhoids  R10.32, Left lower quadrant pain  R19.4, Change in bowel habit  K57.30, Diverticulosis of large intestine without  perforation or abscess without bleeding  CPT copyright 2023 [employer]. All rights reserved.  The codes documented in this report are preliminary and upon  review may  be revised to meet current compliance requirements.  _____________________________  COLETTE HULL MD~KURTIS  5/22/2025 8:54:26 AM  This report has been signed electronically.  Number of Addenda: 0  Note Initiated On: 5/22/2025 8:13 AM

## 2025-05-28 ENCOUNTER — PATIENT OUTREACH (OUTPATIENT)
Dept: CASE MANAGEMENT | Facility: CLINIC | Age: 75
End: 2025-05-28
Payer: MEDICARE

## 2025-06-18 ENCOUNTER — PATIENT OUTREACH (OUTPATIENT)
Dept: CASE MANAGEMENT | Facility: CLINIC | Age: 75
End: 2025-06-18
Payer: MEDICARE

## 2025-07-02 ENCOUNTER — PATIENT OUTREACH (OUTPATIENT)
Dept: CASE MANAGEMENT | Facility: CLINIC | Age: 75
End: 2025-07-02
Payer: MEDICARE

## 2025-07-02 NOTE — PROGRESS NOTES
[facility] follow up call made: Spoke   with the patient who stated that she had a PET scan is she is in remission. She will continue with her current treatment, but there is no signs of the cancer.  She reported that she has a sinus infection and was started on an antibiotic. Instructed on increased fluids and use of a yogurt daily. ACM instructed to call into the office for any immediate questions or concerns.  RN will follow up at a later time.    Maggie Escobedo RN, BSN  Ambulatory Care Manager  [phone]

## 2025-07-22 ENCOUNTER — PATIENT OUTREACH (OUTPATIENT)
Dept: CASE MANAGEMENT | Facility: CLINIC | Age: 75
End: 2025-07-22
Payer: MEDICARE

## 2025-07-22 NOTE — PROGRESS NOTES
LCM follow up call made: Spoke with the patient who reported is still taking an antibiotic and is still congested with a cough. She will see the ENT next Thursday.  She is drinking adequate fluids. ACM instructed to call into the office for any immediate questions or concerns.  RN will follow up at a later time.    Maggie Escobedo RN, BSN  Ambulatory Care Manager  [phone]

## 2025-08-07 ENCOUNTER — HOSPITAL ENCOUNTER (OUTPATIENT)
Dept: RADIOLOGY | Age: 75
Discharge: HOME | End: 2025-08-07
Attending: OTOLARYNGOLOGY
Payer: MEDICARE

## 2025-08-07 DIAGNOSIS — J32.4 CHRONIC PANSINUSITIS: ICD-10-CM

## 2025-08-07 PROCEDURE — 70486 CT MAXILLOFACIAL W/O DYE: CPT

## 2025-08-12 ENCOUNTER — PATIENT OUTREACH (OUTPATIENT)
Dept: CASE MANAGEMENT | Facility: CLINIC | Age: 75
End: 2025-08-12
Payer: MEDICARE

## 2025-08-13 DIAGNOSIS — E03.9 HYPOTHYROIDISM, UNSPECIFIED TYPE: ICD-10-CM

## 2025-08-14 RX ORDER — LEVOTHYROXINE SODIUM 50 UG/1
50 TABLET ORAL DAILY
Qty: 90 TABLET | Refills: 1 | Status: SHIPPED | OUTPATIENT
Start: 2025-08-14

## (undated) DEVICE — CUSTOM PROCEDURE KIT

## (undated) DEVICE — GOWN SIRUS POLYRNF BRTHSLV LG 30/CS